# Patient Record
Sex: MALE | Race: WHITE | NOT HISPANIC OR LATINO | Employment: FULL TIME | URBAN - METROPOLITAN AREA
[De-identification: names, ages, dates, MRNs, and addresses within clinical notes are randomized per-mention and may not be internally consistent; named-entity substitution may affect disease eponyms.]

---

## 2022-03-21 LAB
ALBUMIN/GLOBULIN RATIO: 1.9 (ref 1–2.5)
ALBUMIN: 4.3 (ref 3.6–5.1)
ALP SERPL-CCNC: 70 U/L (ref 35–144)
ALT SERPL W P-5'-P-CCNC: 17 U/L (ref 9–46)
AST SERPL-CCNC: 16 U/L (ref 10–35)
BILIRUB SERPL-MCNC: 0.8 MG/DL (ref 0.2–1.2)
BUN SERPL-MCNC: 17 MG/DL (ref 7–25)
BUN/CREAT SERPL: ABNORMAL (ref 6–22)
CALCIUM SERPL-MCNC: 9.6 MG/DL (ref 8.6–10.3)
CHLORIDE SERPL-SCNC: 105 MMOL/L (ref 98–110)
CHOLEST SERPL-MSCNC: 216 MG/DL (ref 0–200)
CO2 SERPL-SCNC: 26 MMOL/L (ref 20–32)
CREAT SERPL-MCNC: 1.2 MG/DL (ref 0.7–1.3)
EST. GFR  (AFRICAN AMERICAN): 81 ML/MIN/1.73 M2
EST. GFR  (NON AFRICAN AMERICAN): 70 ML/MIN/1.73 M2
GLOBULIN: 2.3 (ref 1.9–3.7)
GLUCOSE SERPL-MCNC: 91 MG/DL (ref 65–99)
HDL/CHOLESTEROL RATIO: 4.3 %
HDLC SERPL-MCNC: 50 MG/DL
LDLC SERPL CALC-MCNC: 141 MG/DL
NON HDL CHOL (CALC): 166
POTASSIUM SERPL-SCNC: 4.2 MMOL/L (ref 3.5–5.3)
PROT SNV-MCNC: 6.6 G/L (ref 6.1–8.1)
SODIUM BLD-SCNC: 138 MMOL/L (ref 135–146)
TRIGL SERPL-MCNC: 126 MG/DL

## 2022-09-02 ENCOUNTER — OFFICE VISIT (OUTPATIENT)
Dept: FAMILY MEDICINE | Facility: CLINIC | Age: 57
End: 2022-09-02
Payer: COMMERCIAL

## 2022-09-02 ENCOUNTER — PATIENT MESSAGE (OUTPATIENT)
Dept: FAMILY MEDICINE | Facility: CLINIC | Age: 57
End: 2022-09-02

## 2022-09-02 VITALS
HEART RATE: 82 BPM | HEIGHT: 70 IN | WEIGHT: 185.19 LBS | BODY MASS INDEX: 26.51 KG/M2 | DIASTOLIC BLOOD PRESSURE: 88 MMHG | OXYGEN SATURATION: 95 % | RESPIRATION RATE: 16 BRPM | SYSTOLIC BLOOD PRESSURE: 130 MMHG | TEMPERATURE: 98 F

## 2022-09-02 DIAGNOSIS — Z78.9 STATIN INTOLERANCE: ICD-10-CM

## 2022-09-02 DIAGNOSIS — G47.00 INSOMNIA, UNSPECIFIED TYPE: ICD-10-CM

## 2022-09-02 DIAGNOSIS — D75.1 POLYCYTHEMIA: ICD-10-CM

## 2022-09-02 DIAGNOSIS — I47.10 SVT (SUPRAVENTRICULAR TACHYCARDIA): ICD-10-CM

## 2022-09-02 DIAGNOSIS — Z76.89 ENCOUNTER TO ESTABLISH CARE: Primary | ICD-10-CM

## 2022-09-02 DIAGNOSIS — K90.41 NCGS (NON-CELIAC GLUTEN SENSITIVITY): ICD-10-CM

## 2022-09-02 PROCEDURE — 1160F RVW MEDS BY RX/DR IN RCRD: CPT | Mod: CPTII,S$GLB,, | Performed by: FAMILY MEDICINE

## 2022-09-02 PROCEDURE — 3075F PR MOST RECENT SYSTOLIC BLOOD PRESS GE 130-139MM HG: ICD-10-PCS | Mod: CPTII,S$GLB,, | Performed by: FAMILY MEDICINE

## 2022-09-02 PROCEDURE — 1159F MED LIST DOCD IN RCRD: CPT | Mod: CPTII,S$GLB,, | Performed by: FAMILY MEDICINE

## 2022-09-02 PROCEDURE — 99999 PR PBB SHADOW E&M-NEW PATIENT-LVL III: ICD-10-PCS | Mod: PBBFAC,,, | Performed by: FAMILY MEDICINE

## 2022-09-02 PROCEDURE — 3075F SYST BP GE 130 - 139MM HG: CPT | Mod: CPTII,S$GLB,, | Performed by: FAMILY MEDICINE

## 2022-09-02 PROCEDURE — 3079F PR MOST RECENT DIASTOLIC BLOOD PRESSURE 80-89 MM HG: ICD-10-PCS | Mod: CPTII,S$GLB,, | Performed by: FAMILY MEDICINE

## 2022-09-02 PROCEDURE — 3008F BODY MASS INDEX DOCD: CPT | Mod: CPTII,S$GLB,, | Performed by: FAMILY MEDICINE

## 2022-09-02 PROCEDURE — 3079F DIAST BP 80-89 MM HG: CPT | Mod: CPTII,S$GLB,, | Performed by: FAMILY MEDICINE

## 2022-09-02 PROCEDURE — 99999 PR PBB SHADOW E&M-NEW PATIENT-LVL III: CPT | Mod: PBBFAC,,, | Performed by: FAMILY MEDICINE

## 2022-09-02 PROCEDURE — 99204 PR OFFICE/OUTPT VISIT, NEW, LEVL IV, 45-59 MIN: ICD-10-PCS | Mod: S$GLB,,, | Performed by: FAMILY MEDICINE

## 2022-09-02 PROCEDURE — 99204 OFFICE O/P NEW MOD 45 MIN: CPT | Mod: S$GLB,,, | Performed by: FAMILY MEDICINE

## 2022-09-02 PROCEDURE — 1160F PR REVIEW ALL MEDS BY PRESCRIBER/CLIN PHARMACIST DOCUMENTED: ICD-10-PCS | Mod: CPTII,S$GLB,, | Performed by: FAMILY MEDICINE

## 2022-09-02 PROCEDURE — 3008F PR BODY MASS INDEX (BMI) DOCUMENTED: ICD-10-PCS | Mod: CPTII,S$GLB,, | Performed by: FAMILY MEDICINE

## 2022-09-02 PROCEDURE — 1159F PR MEDICATION LIST DOCUMENTED IN MEDICAL RECORD: ICD-10-PCS | Mod: CPTII,S$GLB,, | Performed by: FAMILY MEDICINE

## 2022-09-02 RX ORDER — ANASTROZOLE 1 MG/1
TABLET ORAL
COMMUNITY
Start: 2019-04-04 | End: 2022-09-29

## 2022-09-02 RX ORDER — TRAZODONE HYDROCHLORIDE 50 MG/1
TABLET ORAL
COMMUNITY
End: 2022-09-02

## 2022-09-02 RX ORDER — PREDNISONE 20 MG/1
TABLET ORAL
COMMUNITY
Start: 2022-01-04 | End: 2022-09-02 | Stop reason: SDUPTHER

## 2022-09-02 RX ORDER — PREDNISONE 20 MG/1
TABLET ORAL
Qty: 30 TABLET | Refills: 3 | Status: SHIPPED | OUTPATIENT
Start: 2022-09-02 | End: 2022-12-01

## 2022-09-02 RX ORDER — GEMFIBROZIL 600 MG/1
TABLET, FILM COATED ORAL
COMMUNITY
Start: 2020-08-02 | End: 2022-09-29

## 2022-09-02 RX ORDER — EZETIMIBE 10 MG/1
TABLET ORAL
COMMUNITY
Start: 2020-08-02 | End: 2022-09-29

## 2022-09-02 RX ORDER — ZOLPIDEM TARTRATE 10 MG/1
TABLET ORAL
COMMUNITY
Start: 2020-07-05 | End: 2022-09-29

## 2022-09-02 NOTE — PROGRESS NOTES
Subjective:      Patient ID: Harish Busby is a 57 y.o. male.    Chief Complaint: Establish Care    HPI    Patient with pmhx of polycythemia, HLD, HTN, SVT, non celiac gluten sensitivity, dairy sensitivity, insomnia here today to establish care. Taking prednisone for sensitivities - 20 mg as needed. He was started on this in January of this year. Also notes a history of elevated estrogen. Patient taking vitamin D.     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 Recent cbc in 6/2022 - nml.     Was seeing Dr. Orourke - endocrinology   [facility] cardiology - SVT - metoprolol.     Unable to tolerate statin medications - memory loss - was on injection but held due to interfering with testosterone/allergy    Past Medical History:   Diagnosis Date    Hyperlipidemia     Hypertension     NCGS (non-celiac gluten sensitivity)     Polycythemia     Statin intolerance     SVT (supraventricular tachycardia)        Past Surgical History:   Procedure Laterality Date    DENTAL SURGERY      dental implant    FINGER SURGERY         Family History   Problem Relation Age of Onset    Hypertension Mother     Cancer Father         cancer - leukemia    Cancer Brother         lung       Social History     Socioeconomic History    Marital status:    Occupational History    Occupation: [employer]   Tobacco Use    Smoking status: Former     Packs/day: 1.00     Years: 5.00     Pack years: 5.00     Types: Cigarettes    Tobacco comments:     Quit 19 yo   Substance and Sexual Activity    Alcohol use: Not Currently    Drug use: Not Currently    Sexual activity: Not Currently       Health Maintenance Topics with due status: Not Due       Topic Last Completion Date    TETANUS VACCINE 08/07/2013    Colorectal Cancer Screening 03/01/2014       Medication List with Changes/Refills   Current Medications    ANASTROZOLE (ARIMIDEX) 1 MG TAB    anastrozole 1 mg  tablet   TAKE 1 TABLET BY MOUTH ONCE DAILY AS DIRECTED FOR 90 DAYS    CALCIUM CARBONATE-VITAMIN D3 (CALCIUM 600 WITH VITAMIN D3) 600 MG-10 MCG (400 UNIT) CHEW        EZETIMIBE (ZETIA) 10 MG TABLET    Zetia 10 mg tablet   Take 1 tablet every day by oral route for 90 days.    GEMFIBROZIL (LOPID) 600 MG TABLET    gemfibrozil 600 mg tablet   TAKE 1 TABLET BY MOUTH TWICE DAILY    METOPROLOL SUCCINATE 25 MG CSPX    1/2 tab    ZOLPIDEM (AMBIEN) 10 MG TAB    zolpidem 10 mg tablet   TAKE 1 TABLET BY MOUTH ONCE DAILY AT BEDTIME   Changed and/or Refilled Medications    Modified Medication Previous Medication    PREDNISONE (DELTASONE) 20 MG TABLET predniSONE (DELTASONE) 20 MG tablet       prednisone 20 mg tablet  TAKE 1 TABLET BY MOUTH ONCE DAILY AS NEEDED Strength: 20 mg    prednisone 20 mg tablet   TAKE 1 TABLET BY MOUTH ONCE DAILY AS NEEDED   Discontinued Medications    TRAZODONE (DESYREL) 50 MG TABLET    1 tablet at bedtime as needed       Review of patient's allergies indicates:  No Known Allergies    Review of Systems   Constitutional:  Negative for fever.   HENT:  Negative for congestion.    Eyes:  Negative for blurred vision.   Respiratory:  Negative for shortness of breath.    Cardiovascular:  Negative for chest pain and leg swelling.   Gastrointestinal:  Negative for abdominal pain, constipation and diarrhea.   Genitourinary:  Negative for dysuria.   Skin:  Negative for rash.   Neurological:  Negative for headaches.     Objective:     Vitals:    09/02/22 0927   BP: 130/88   Pulse: 82   Resp: 16   Temp: 97.6 °F (36.4 °C)     Body mass index is 26.57 kg/m².    Physical Exam  Vitals and nursing note reviewed.   Constitutional:       General: He is not in acute distress.     Appearance: He is well-developed.   HENT:      Head: Normocephalic and atraumatic.      Right Ear: External ear normal.      Left Ear: External ear normal.      Nose: Nose normal.   Eyes:      Conjunctiva/sclera: Conjunctivae normal.      Pupils:  Pupils are equal, round, and reactive to light.   Neck:      Thyroid: No thyromegaly.   Cardiovascular:      Rate and Rhythm: Normal rate and regular rhythm.      Heart sounds: Normal heart sounds. No murmur heard.  Pulmonary:      Effort: Pulmonary effort is normal. No respiratory distress.      Breath sounds: Normal breath sounds. No wheezing or rales.   Chest:      Chest wall: No tenderness.   Abdominal:      General: Bowel sounds are normal. There is no distension.      Palpations: Abdomen is soft.      Tenderness: There is no abdominal tenderness.   Lymphadenopathy:      Cervical: No cervical adenopathy.   Skin:     General: Skin is warm and dry.   Neurological:      Mental Status: He is alert and oriented to person, place, and time.       Assessment and Plan:     Encounter to establish care    SVT (supraventricular tachycardia)    Polycythemia  -     predniSONE (DELTASONE) 20 MG tablet; prednisone 20 mg tablet  TAKE 1 TABLET BY MOUTH ONCE DAILY AS NEEDED Strength: 20 mg  Dispense: 30 tablet; Refill: 3    NCGS (non-celiac gluten sensitivity)  -     predniSONE (DELTASONE) 20 MG tablet; prednisone 20 mg tablet  TAKE 1 TABLET BY MOUTH ONCE DAILY AS NEEDED Strength: 20 mg  Dispense: 30 tablet; Refill: 3    Statin intolerance    Insomnia, unspecified type    Bone scan in 1 year - given long term use of steroids       No follow-ups on file.

## 2022-09-11 ENCOUNTER — PATIENT MESSAGE (OUTPATIENT)
Dept: FAMILY MEDICINE | Facility: CLINIC | Age: 57
End: 2022-09-11
Payer: COMMERCIAL

## 2022-09-17 NOTE — TELEPHONE ENCOUNTER
Staff can you please print the attachments and place in my box.   Yulia can you update needed labs in patient's chart.

## 2022-09-23 ENCOUNTER — PATIENT OUTREACH (OUTPATIENT)
Dept: ADMINISTRATIVE | Facility: HOSPITAL | Age: 57
End: 2022-09-23
Payer: COMMERCIAL

## 2022-09-26 ENCOUNTER — PATIENT MESSAGE (OUTPATIENT)
Dept: FAMILY MEDICINE | Facility: CLINIC | Age: 57
End: 2022-09-26
Payer: COMMERCIAL

## 2022-09-26 NOTE — TELEPHONE ENCOUNTER
No new care gaps identified.  [facility] Embedded Care Gaps. Reference number: [id number]. 9/26/2022   11:17:55 AM MERLENET

## 2022-09-26 NOTE — TELEPHONE ENCOUNTER
LV - 9/2/22    From [employer] message, not sure if these are already set to 90-day or which pharmacy to send to

## 2022-09-28 RX ORDER — ZOLPIDEM TARTRATE 10 MG/1
TABLET ORAL
Qty: 30 TABLET | Refills: 2 | Status: CANCELLED | OUTPATIENT
Start: 2022-09-28

## 2022-09-28 RX ORDER — EZETIMIBE 10 MG/1
TABLET ORAL
Qty: 30 TABLET | Refills: 2 | Status: CANCELLED | OUTPATIENT
Start: 2022-09-28

## 2022-09-28 RX ORDER — ANASTROZOLE 1 MG/1
TABLET ORAL
Qty: 30 TABLET | Refills: 2 | Status: CANCELLED | OUTPATIENT
Start: 2022-09-28

## 2022-09-28 RX ORDER — GEMFIBROZIL 600 MG/1
TABLET, FILM COATED ORAL
Qty: 60 TABLET | Status: CANCELLED | OUTPATIENT
Start: 2022-09-28

## 2022-09-29 RX ORDER — METOPROLOL SUCCINATE 25 MG/1
12.5 TABLET, EXTENDED RELEASE ORAL DAILY
Qty: 45 TABLET | Refills: 3 | Status: SHIPPED | OUTPATIENT
Start: 2022-09-29 | End: 2022-10-01 | Stop reason: SDUPTHER

## 2022-09-29 RX ORDER — EZETIMIBE 10 MG/1
10 TABLET ORAL DAILY
Qty: 90 TABLET | Refills: 3 | Status: SHIPPED | OUTPATIENT
Start: 2022-09-29 | End: 2024-03-18

## 2022-09-29 RX ORDER — GEMFIBROZIL 600 MG/1
600 TABLET, FILM COATED ORAL
Qty: 180 TABLET | Refills: 3 | Status: SHIPPED | OUTPATIENT
Start: 2022-09-29 | End: 2022-12-01

## 2022-09-29 RX ORDER — ZOLPIDEM TARTRATE 10 MG/1
10 TABLET ORAL NIGHTLY PRN
Qty: 30 TABLET | Refills: 2 | Status: SHIPPED | OUTPATIENT
Start: 2022-09-29 | End: 2023-03-17

## 2022-09-29 RX ORDER — ZOLPIDEM TARTRATE 10 MG/1
10 TABLET ORAL NIGHTLY PRN
Qty: 90 TABLET | Refills: 0 | Status: SHIPPED | OUTPATIENT
Start: 2022-09-29 | End: 2022-09-29 | Stop reason: SDUPTHER

## 2022-09-29 RX ORDER — ANASTROZOLE 1 MG/1
1 TABLET ORAL DAILY
Qty: 90 TABLET | Refills: 3 | Status: SHIPPED | OUTPATIENT
Start: 2022-09-29 | End: 2023-09-29

## 2022-09-29 NOTE — TELEPHONE ENCOUNTER
LV - 9/2/22    Pt wants AMBIEN as a 30-day supply, not 90-day supply that was already sent. Per [employer] message

## 2022-09-29 NOTE — TELEPHONE ENCOUNTER
Come talk with me - I need to discuss how to do refills with you.  Also call him and verify if he is taking 1/2 tab of 25 mg of metoprolol succinate daily.

## 2022-09-29 NOTE — TELEPHONE ENCOUNTER
Care Due:                  Date            Visit Type   Department     Provider  --------------------------------------------------------------------------------                                NP -                              PRIMARY      [facility]  Last Visit: 09-      CARE ([facility])   MEDICINE       Kaila West  Next Visit: None Scheduled  None         None Found                                                            Last  Test          Frequency    Reason                     Performed    Due Date  --------------------------------------------------------------------------------    CBC.........  12 months..  gemfibroziL..............  Not Found    Overdue    Lipid Panel.  12 months..  ezetimibe, gemfibroziL...  03- 09-    [facility] Embedded Care Gaps. Reference number: [id number]. 9/29/2022   1:50:33 PM CDT

## 2022-09-30 NOTE — TELEPHONE ENCOUNTER
Pt states he takes 2 metoprolol daily (50 mg). States that was his previous RX so he'd like to stay at 50 unless advised otherwise

## 2022-10-01 RX ORDER — METOPROLOL SUCCINATE 25 MG/1
50 TABLET, EXTENDED RELEASE ORAL DAILY
Qty: 180 TABLET | Refills: 3 | Status: SHIPPED | OUTPATIENT
Start: 2022-10-01 | End: 2023-03-12

## 2022-10-19 ENCOUNTER — TELEPHONE (OUTPATIENT)
Dept: FAMILY MEDICINE | Facility: CLINIC | Age: 57
End: 2022-10-19

## 2022-10-19 ENCOUNTER — TELEPHONE (OUTPATIENT)
Dept: GASTROENTEROLOGY | Facility: CLINIC | Age: 57
End: 2022-10-19
Payer: COMMERCIAL

## 2022-10-19 ENCOUNTER — HOSPITAL ENCOUNTER (OUTPATIENT)
Dept: RADIOLOGY | Facility: HOSPITAL | Age: 57
Discharge: HOME | End: 2022-10-19
Attending: INTERNAL MEDICINE
Payer: COMMERCIAL

## 2022-10-19 ENCOUNTER — OFFICE VISIT (OUTPATIENT)
Dept: FAMILY MEDICINE | Facility: CLINIC | Age: 57
End: 2022-10-19
Payer: COMMERCIAL

## 2022-10-19 VITALS
BODY MASS INDEX: 25.88 KG/M2 | HEIGHT: 70 IN | WEIGHT: 180.75 LBS | TEMPERATURE: 96 F | OXYGEN SATURATION: 97 % | SYSTOLIC BLOOD PRESSURE: 126 MMHG | HEART RATE: 81 BPM | DIASTOLIC BLOOD PRESSURE: 94 MMHG

## 2022-10-19 DIAGNOSIS — K92.1 BLOOD IN STOOL: ICD-10-CM

## 2022-10-19 DIAGNOSIS — R10.11 RIGHT UPPER QUADRANT ABDOMINAL PAIN: Primary | ICD-10-CM

## 2022-10-19 DIAGNOSIS — R10.9 ABDOMINAL PAIN, UNSPECIFIED ABDOMINAL LOCATION: Primary | ICD-10-CM

## 2022-10-19 DIAGNOSIS — R10.11 RIGHT UPPER QUADRANT ABDOMINAL PAIN: ICD-10-CM

## 2022-10-19 PROCEDURE — 1159F MED LIST DOCD IN RCRD: CPT | Mod: CPTII,S$GLB,, | Performed by: INTERNAL MEDICINE

## 2022-10-19 PROCEDURE — 3074F SYST BP LT 130 MM HG: CPT | Mod: CPTII,S$GLB,, | Performed by: INTERNAL MEDICINE

## 2022-10-19 PROCEDURE — 74019 RADEX ABDOMEN 2 VIEWS: CPT | Mod: 26,,, | Performed by: RADIOLOGY

## 2022-10-19 PROCEDURE — 99214 PR OFFICE/OUTPT VISIT, EST, LEVL IV, 30-39 MIN: ICD-10-PCS | Mod: S$GLB,,, | Performed by: INTERNAL MEDICINE

## 2022-10-19 PROCEDURE — 1159F PR MEDICATION LIST DOCUMENTED IN MEDICAL RECORD: ICD-10-PCS | Mod: CPTII,S$GLB,, | Performed by: INTERNAL MEDICINE

## 2022-10-19 PROCEDURE — 3080F DIAST BP >= 90 MM HG: CPT | Mod: CPTII,S$GLB,, | Performed by: INTERNAL MEDICINE

## 2022-10-19 PROCEDURE — 99999 PR PBB SHADOW E&M-EST. PATIENT-LVL V: ICD-10-PCS | Mod: PBBFAC,,, | Performed by: INTERNAL MEDICINE

## 2022-10-19 PROCEDURE — 99999 PR PBB SHADOW E&M-EST. PATIENT-LVL V: CPT | Mod: PBBFAC,,, | Performed by: INTERNAL MEDICINE

## 2022-10-19 PROCEDURE — 74019 RADEX ABDOMEN 2 VIEWS: CPT | Mod: TC,FY,PO

## 2022-10-19 PROCEDURE — 3074F PR MOST RECENT SYSTOLIC BLOOD PRESSURE < 130 MM HG: ICD-10-PCS | Mod: CPTII,S$GLB,, | Performed by: INTERNAL MEDICINE

## 2022-10-19 PROCEDURE — 99214 OFFICE O/P EST MOD 30 MIN: CPT | Mod: S$GLB,,, | Performed by: INTERNAL MEDICINE

## 2022-10-19 PROCEDURE — 74019 XR ABDOMEN FLAT AND ERECT: ICD-10-PCS | Mod: 26,,, | Performed by: RADIOLOGY

## 2022-10-19 PROCEDURE — 3080F PR MOST RECENT DIASTOLIC BLOOD PRESSURE >= 90 MM HG: ICD-10-PCS | Mod: CPTII,S$GLB,, | Performed by: INTERNAL MEDICINE

## 2022-10-19 RX ORDER — TRAZODONE HYDROCHLORIDE 50 MG/1
1 TABLET ORAL NIGHTLY PRN
COMMUNITY
End: 2023-02-28

## 2022-10-19 RX ORDER — PANTOPRAZOLE SODIUM 40 MG/1
40 TABLET, DELAYED RELEASE ORAL DAILY
Qty: 30 TABLET | Refills: 1 | Status: SHIPPED | OUTPATIENT
Start: 2022-10-19 | End: 2022-10-26

## 2022-10-19 NOTE — PROGRESS NOTES
Subjective:       Patient ID: Harish Busby is a 57 y.o. male.    Chief Complaint: Abdominal Pain    2 months RUQ abdominal discomfort-----------worse with eating .       Now with some bright red blood with stool---------last week or so.          No N/V.  Had blood tests at lab vicky cmp,,cbc, u/a was normal. Iron low.   2 weeks ago.        Had colon about 3 years ago gi asso.---polyps,         Abdominal Pain  Pertinent negatives include no arthralgias, constipation, diarrhea, dysuria, fever, frequency, headaches, hematuria, myalgias, nausea or vomiting.   Past Medical History:   Diagnosis Date    Hyperlipidemia     Hypertension     NCGS (non-celiac gluten sensitivity)     Polycythemia     Statin intolerance     SVT (supraventricular tachycardia)      Past Surgical History:   Procedure Laterality Date    DENTAL SURGERY      dental implant    FINGER SURGERY       Family History   Problem Relation Age of Onset    Hypertension Mother     Cancer Father         cancer - leukemia    Cancer Brother         lung     Social History     Socioeconomic History    Marital status:    Occupational History    Occupation: [employer]   Tobacco Use    Smoking status: Former     Packs/day: 1.00     Years: 5.00     Pack years: 5.00     Types: Cigarettes    Smokeless tobacco: Never    Tobacco comments:     Quit 17 yo   Substance and Sexual Activity    Alcohol use: Not Currently    Drug use: Not Currently    Sexual activity: Not Currently     Review of Systems   Constitutional:  Negative for activity change, appetite change, chills, diaphoresis, fatigue, fever and unexpected weight change.   HENT:  Negative for drooling, ear discharge, ear pain, facial swelling, hearing loss, mouth sores, nosebleeds, postnasal drip, rhinorrhea, sinus pressure, sneezing, sore throat, tinnitus, trouble swallowing and voice change.    Eyes:  Negative for photophobia, redness and visual disturbance.   Respiratory:  Negative for apnea, cough,  choking, chest tightness, shortness of breath and wheezing.    Cardiovascular:  Negative for chest pain, palpitations and leg swelling.   Gastrointestinal:  Positive for abdominal pain and blood in stool. Negative for abdominal distention, constipation, diarrhea, nausea, rectal pain and vomiting.   Endocrine: Negative for cold intolerance, heat intolerance, polydipsia, polyphagia and polyuria.   Genitourinary:  Negative for difficulty urinating, dysuria, enuresis, flank pain, frequency, genital sores, hematuria and urgency.   Musculoskeletal:  Negative for arthralgias, back pain, gait problem, joint swelling, myalgias, neck pain and neck stiffness.   Skin:  Negative for color change, pallor, rash and wound.   Allergic/Immunologic: Negative for food allergies and immunocompromised state.   Neurological:  Negative for dizziness, tremors, seizures, syncope, facial asymmetry, speech difficulty, weakness, light-headedness, numbness and headaches.   Hematological:  Negative for adenopathy. Does not bruise/bleed easily.   Psychiatric/Behavioral:  Negative for agitation, behavioral problems, confusion, decreased concentration, dysphoric mood, hallucinations, self-injury, sleep disturbance and suicidal ideas. The patient is not nervous/anxious and is not hyperactive.      Objective:      Physical Exam  Vitals and nursing note reviewed.   Constitutional:       General: He is not in acute distress.     Appearance: Normal appearance. He is well-developed. He is not diaphoretic.   HENT:      Head: Normocephalic and atraumatic.      Mouth/Throat:      Pharynx: No oropharyngeal exudate.   Eyes:      General: No scleral icterus.     Pupils: Pupils are equal, round, and reactive to light.   Neck:      Thyroid: No thyromegaly.      Vascular: No carotid bruit or JVD.      Trachea: No tracheal deviation.   Cardiovascular:      Rate and Rhythm: Normal rate and regular rhythm.      Heart sounds: Normal heart sounds.   Pulmonary:       Effort: Pulmonary effort is normal. No respiratory distress.      Breath sounds: Normal breath sounds. No wheezing or rales.   Chest:      Chest wall: No tenderness.   Abdominal:      General: Bowel sounds are normal. There is no distension.      Palpations: Abdomen is soft. There is no mass.      Tenderness: There is abdominal tenderness. There is no right CVA tenderness, left CVA tenderness, guarding or rebound.      Hernia: No hernia is present.   Genitourinary:     Rectum: Normal. Guaiac result negative.   Musculoskeletal:         General: No tenderness. Normal range of motion.      Cervical back: Normal range of motion and neck supple.   Lymphadenopathy:      Cervical: No cervical adenopathy.   Skin:     General: Skin is warm and dry.      Coloration: Skin is not pale.      Findings: No erythema or rash.   Neurological:      Mental Status: He is alert and oriented to person, place, and time.   Psychiatric:         Behavior: Behavior normal.         Thought Content: Thought content normal.         Judgment: Judgment normal.       CMP  Sodium   Date Value Ref Range Status   03/21/2022 138 135 - 146 mmol/L Final     Potassium   Date Value Ref Range Status   03/21/2022 4.2 3.5 - 5.3 mmol/L Final     Chloride   Date Value Ref Range Status   03/21/2022 105 98 - 110 mmol/L Final     CO2   Date Value Ref Range Status   03/21/2022 26 20 - 32 mmol/L Final     Glucose   Date Value Ref Range Status   03/21/2022 91 65 - 99 mg/dL Final     BUN   Date Value Ref Range Status   03/21/2022 17 7 - 25 mg/dL Final     Creatinine   Date Value Ref Range Status   03/21/2022 1.2 0.7 - 1.3 mg/dL Final     Calcium   Date Value Ref Range Status   03/21/2022 9.6 8.6 - 10.3 mg/dL Final     Albumin   Date Value Ref Range Status   03/21/2022 4.3 3.6 - 5.1 Final     Total Bilirubin   Date Value Ref Range Status   03/21/2022 0.8 0.2 - 1.2 mg/dL Final     Alkaline Phosphatase   Date Value Ref Range Status   03/21/2022 70 35 - 144 U/L Final      AST   Date Value Ref Range Status   03/21/2022 16 10 - 35 U/L Final     ALT   Date Value Ref Range Status   03/21/2022 17 9 - 46 U/L Final     eGFR if    Date Value Ref Range Status   03/21/2022 81 >OR=60 mL/min/1.73 m2 Final     eGFR if non    Date Value Ref Range Status   03/21/2022 70.0 >OR=60 mL/min/1.73 m2 Final     No results found for: WBC, HGB, HCT, MCV, PLT  Lab Results   Component Value Date    CHOL 216 (H) 03/21/2022     Lab Results   Component Value Date    HDL 50 03/21/2022     Lab Results   Component Value Date    LDLCALC 141 (H) 03/21/2022     Lab Results   Component Value Date    TRIG 126 03/21/2022     Lab Results   Component Value Date    CHOLHDL 4.3 03/21/2022     No results found for: TSH  No results found for: LABA1C, HGBA1C  Assessment:       1. Right upper quadrant abdominal pain    2. Blood in stool          Plan:   Right upper quadrant abdominal pain  -     CBC Auto Differential; Future; Expected date: 10/19/2022, CMP-------  -     X-Ray Abdomen Flat And Erect; Future; Expected date: 10/19/2022  -     Cancel: Ambulatory referral/consult to Gastroenterology; Future; Expected date: 10/26/2022  -     Ambulatory referral/consult to Gastroenterology; Future; Expected date: 10/26/2022    Blood in stool  -     Cancel: Ambulatory referral/consult to Gastroenterology; Future; Expected date: 10/26/2022    Other orders  -     pantoprazole (PROTONIX) 40 MG tablet; Take 1 tablet (40 mg total) by mouth once daily.  Dispense: 30 tablet; Refill: 1      As above------------f/u 2 weeks---------go to er for worsening symptoms.-----------------

## 2022-10-26 ENCOUNTER — OFFICE VISIT (OUTPATIENT)
Dept: GASTROENTEROLOGY | Facility: CLINIC | Age: 57
End: 2022-10-26
Payer: COMMERCIAL

## 2022-10-26 ENCOUNTER — HOSPITAL ENCOUNTER (OUTPATIENT)
Dept: RADIOLOGY | Facility: HOSPITAL | Age: 57
Discharge: HOME | End: 2022-10-26
Attending: NURSE PRACTITIONER
Payer: COMMERCIAL

## 2022-10-26 VITALS
HEIGHT: 70 IN | DIASTOLIC BLOOD PRESSURE: 92 MMHG | SYSTOLIC BLOOD PRESSURE: 150 MMHG | HEART RATE: 73 BPM | BODY MASS INDEX: 26.29 KG/M2 | WEIGHT: 183.63 LBS

## 2022-10-26 DIAGNOSIS — R10.9 RIGHT SIDED ABDOMINAL PAIN: Primary | ICD-10-CM

## 2022-10-26 DIAGNOSIS — K90.41 GLUTEN INTOLERANCE: ICD-10-CM

## 2022-10-26 DIAGNOSIS — K59.09 CHRONIC CONSTIPATION: ICD-10-CM

## 2022-10-26 DIAGNOSIS — R10.9 RIGHT SIDED ABDOMINAL PAIN: ICD-10-CM

## 2022-10-26 DIAGNOSIS — K62.5 RECTAL BLEEDING: ICD-10-CM

## 2022-10-26 PROCEDURE — 74019 RADEX ABDOMEN 2 VIEWS: CPT | Mod: TC,FY,PO

## 2022-10-26 PROCEDURE — 3080F DIAST BP >= 90 MM HG: CPT | Mod: CPTII,S$GLB,, | Performed by: NURSE PRACTITIONER

## 2022-10-26 PROCEDURE — 3080F PR MOST RECENT DIASTOLIC BLOOD PRESSURE >= 90 MM HG: ICD-10-PCS | Mod: CPTII,S$GLB,, | Performed by: NURSE PRACTITIONER

## 2022-10-26 PROCEDURE — 99204 PR OFFICE/OUTPT VISIT, NEW, LEVL IV, 45-59 MIN: ICD-10-PCS | Mod: S$GLB,,, | Performed by: NURSE PRACTITIONER

## 2022-10-26 PROCEDURE — 1160F RVW MEDS BY RX/DR IN RCRD: CPT | Mod: CPTII,S$GLB,, | Performed by: NURSE PRACTITIONER

## 2022-10-26 PROCEDURE — 1159F MED LIST DOCD IN RCRD: CPT | Mod: CPTII,S$GLB,, | Performed by: NURSE PRACTITIONER

## 2022-10-26 PROCEDURE — 74019 XR ABDOMEN FLAT AND ERECT: ICD-10-PCS | Mod: 26,,, | Performed by: RADIOLOGY

## 2022-10-26 PROCEDURE — 99999 PR PBB SHADOW E&M-EST. PATIENT-LVL V: CPT | Mod: PBBFAC,,, | Performed by: NURSE PRACTITIONER

## 2022-10-26 PROCEDURE — 3077F SYST BP >= 140 MM HG: CPT | Mod: CPTII,S$GLB,, | Performed by: NURSE PRACTITIONER

## 2022-10-26 PROCEDURE — 99204 OFFICE O/P NEW MOD 45 MIN: CPT | Mod: S$GLB,,, | Performed by: NURSE PRACTITIONER

## 2022-10-26 PROCEDURE — 3077F PR MOST RECENT SYSTOLIC BLOOD PRESSURE >= 140 MM HG: ICD-10-PCS | Mod: CPTII,S$GLB,, | Performed by: NURSE PRACTITIONER

## 2022-10-26 PROCEDURE — 99999 PR PBB SHADOW E&M-EST. PATIENT-LVL V: ICD-10-PCS | Mod: PBBFAC,,, | Performed by: NURSE PRACTITIONER

## 2022-10-26 PROCEDURE — 1160F PR REVIEW ALL MEDS BY PRESCRIBER/CLIN PHARMACIST DOCUMENTED: ICD-10-PCS | Mod: CPTII,S$GLB,, | Performed by: NURSE PRACTITIONER

## 2022-10-26 PROCEDURE — 1159F PR MEDICATION LIST DOCUMENTED IN MEDICAL RECORD: ICD-10-PCS | Mod: CPTII,S$GLB,, | Performed by: NURSE PRACTITIONER

## 2022-10-26 PROCEDURE — 74019 RADEX ABDOMEN 2 VIEWS: CPT | Mod: 26,,, | Performed by: RADIOLOGY

## 2022-10-26 RX ORDER — DICYCLOMINE HYDROCHLORIDE 20 MG/1
20 TABLET ORAL 3 TIMES DAILY PRN
Qty: 30 TABLET | Refills: 0 | Status: SHIPPED | OUTPATIENT
Start: 2022-10-26 | End: 2022-11-17

## 2022-10-26 NOTE — PROGRESS NOTES
"Clinic Consult:  [facility] Consultation Note    Reason for Consult:  The primary encounter diagnosis was Right sided abdominal pain. Diagnoses of Chronic constipation, Gluten intolerance, and Rectal bleeding were also pertinent to this visit.    PCP: Kaila West   [address] / [place] [id number]    HPI:  This is a 57 y.o. male here for evaluation of abdominal pain.     Onset of symptoms started 8 weeks ago. He started with abdominal pain and constipation at the same time. His pain is located over the RLQ abdomen. Pain is intermittent. He is unable to identify any aggravating or relieving factors. He describes the pain as "numbness" and "fullness".     He also started with rectal bleeding. Bleeding was bright red blood. The red blood appeared to be streaked on the outside of the stool as well as on the toilet tissue.     He has a history of significant seasonal and food allergies. He has a family history of celiac disease in his two nieces and another brother is non-celiac gluten intolerant. He denies any other family hx of bowel diseases.     He was initially started on prednisone for skin rashes that are thought to be secondary to food allergies. He noticed that when he would take the prednisone, he felt like his bowels moved better.     He started fiber supplement and feels his constipation has improved but continues with abdominal pain and rectal bleeding.     His last colonoscopy was 3 years ago [place] [facility] [place] ?colon polyps.     Review of Systems   Constitutional:  Negative for fever, malaise/fatigue and weight loss.   HENT:  Negative for sore throat.    Respiratory:  Negative for cough and wheezing.    Cardiovascular:  Negative for chest pain and palpitations.   Gastrointestinal:  Positive for heartburn.   Genitourinary:  Negative for dysuria and frequency.   Musculoskeletal:  Negative for back pain, joint pain, myalgias and neck pain.   Skin:  Negative for itching and rash. "   Neurological:  Negative for dizziness, speech change, seizures, loss of consciousness and headaches.   Psychiatric/Behavioral:  Negative for depression and substance abuse. The patient is not nervous/anxious.      Medical History:  has a past medical history of Hyperlipidemia, Hypertension, NCGS (non-celiac gluten sensitivity), Polycythemia, Statin intolerance, and SVT (supraventricular tachycardia).    Surgical History:  has a past surgical history that includes Finger surgery and Dental surgery.    Family History: family history includes Cancer in his brother and father; Hypertension in his mother..     Social History:  reports that he has quit smoking. His smoking use included cigarettes. He has a 5.00 pack-year smoking history. He has never used smokeless tobacco. He reports that he does not currently use alcohol. He reports that he does not currently use drugs.    Allergies: Reviewed    Home Medications:   Current Outpatient Medications on File Prior to Visit   Medication Sig Dispense Refill    anastrozole (ARIMIDEX) 1 mg Tab Take 1 tablet (1 mg total) by mouth once daily. 90 tablet 3    calcium carbonate-vitamin D3 (CALCIUM 600 WITH VITAMIN D3) 600 mg-10 mcg (400 unit) Chew       ezetimibe (ZETIA) 10 mg tablet Take 1 tablet (10 mg total) by mouth once daily. 90 tablet 3    gemfibroziL (LOPID) 600 MG tablet Take 1 tablet (600 mg total) by mouth 2 (two) times daily before meals. 180 tablet 3    metoprolol succinate (TOPROL-XL) 25 MG 24 hr tablet Take 2 tablets (50 mg total) by mouth once daily. 180 tablet 3    predniSONE (DELTASONE) 20 MG tablet prednisone 20 mg tablet  TAKE 1 TABLET BY MOUTH ONCE DAILY AS NEEDED Strength: 20 mg 30 tablet 3    traZODone (DESYREL) 50 MG tablet 1 tablet nightly as needed.      zolpidem (AMBIEN) 10 mg Tab Take 1 tablet (10 mg total) by mouth nightly as needed (insomnia). 30 tablet 2     No current facility-administered medications on file prior to visit.       Physical Exam:  BP  "(!) 150/92 (BP Location: Left arm, Patient Position: Sitting, BP Method: Large (Manual))   Pulse 73   Ht 5' 10" (1.778 m)   Wt 83.3 kg (183 lb 10.3 oz)   BMI 26.35 kg/m²   Body mass index is 26.35 kg/m².  Physical Exam  Constitutional:       General: He is not in acute distress.  HENT:      Head: Normocephalic.   Neurological:      General: No focal deficit present.      Mental Status: He is alert.   Psychiatric:         Mood and Affect: Mood normal.         Judgment: Judgment normal.       Labs: Pertinent labs reviewed.  Assessment:  1. Right sided abdominal pain    2. Chronic constipation    3. Gluten intolerance    4. Rectal bleeding      Possible continued constipation as cause of symptoms. His abdominal pain is not classic for GI etiology, however, it is a consideration. I find it interesting that prednisone improves his constipation and improves his "digestion". No known underlying bowel disease. He is gluten intolerant and follows a gluten free diet. Last colonoscopy was 3 years ago.    Recommendations:   - repeat xray today to assess response to fiber supplement and to verify constipation has resolved.   - proceed with colonoscopy on 10/31/22  - if all of the above or normal, I suggest repeating his colonoscopy.   - would not recommend celiac serology despite possibility 2/2 gluten free diet as this can cause false negative results.     Right sided abdominal pain  -     Ambulatory referral/consult to Gastroenterology  -     X-Ray Abdomen Flat And Erect; Future; Expected date: 10/26/2022    Chronic constipation  -     X-Ray Abdomen Flat And Erect; Future; Expected date: 10/26/2022    Gluten intolerance  -     X-Ray Abdomen Flat And Erect; Future; Expected date: 10/26/2022    Rectal bleeding    Other orders  -     dicyclomine (BENTYL) 20 mg tablet; Take 1 tablet (20 mg total) by mouth 3 (three) times daily as needed (abdominal pain).  Dispense: 30 tablet; Refill: 0      Follow up to be determined after " results/ procedure(s).    Thank you so much for allowing me to participate in the care of DERRICK Calle

## 2022-10-27 RX ORDER — POLYETHYLENE GLYCOL 3350, SODIUM SULFATE ANHYDROUS, SODIUM BICARBONATE, SODIUM CHLORIDE, POTASSIUM CHLORIDE 236; 22.74; 6.74; 5.86; 2.97 G/4L; G/4L; G/4L; G/4L; G/4L
4 POWDER, FOR SOLUTION ORAL ONCE
Qty: 4000 ML | Refills: 0 | Status: SHIPPED | OUTPATIENT
Start: 2022-10-27 | End: 2022-10-27

## 2022-10-31 ENCOUNTER — HOSPITAL ENCOUNTER (OUTPATIENT)
Dept: RADIOLOGY | Facility: HOSPITAL | Age: 57
Discharge: HOME | End: 2022-10-31
Attending: INTERNAL MEDICINE
Payer: COMMERCIAL

## 2022-10-31 DIAGNOSIS — R10.9 ABDOMINAL PAIN, UNSPECIFIED ABDOMINAL LOCATION: ICD-10-CM

## 2022-10-31 PROCEDURE — 74177 CT ABDOMEN PELVIS WITH CONTRAST: ICD-10-PCS | Mod: 26,,, | Performed by: RADIOLOGY

## 2022-10-31 PROCEDURE — 74177 CT ABD & PELVIS W/CONTRAST: CPT | Mod: 26,,, | Performed by: RADIOLOGY

## 2022-10-31 PROCEDURE — 25500020 PHARM REV CODE 255: Performed by: INTERNAL MEDICINE

## 2022-10-31 PROCEDURE — 74177 CT ABD & PELVIS W/CONTRAST: CPT | Mod: TC

## 2022-10-31 RX ADMIN — IOHEXOL 30 ML: 350 INJECTION, SOLUTION INTRAVENOUS at 12:10

## 2022-10-31 RX ADMIN — IOHEXOL 100 ML: 350 INJECTION, SOLUTION INTRAVENOUS at 01:10

## 2022-11-02 ENCOUNTER — OFFICE VISIT (OUTPATIENT)
Dept: FAMILY MEDICINE | Facility: CLINIC | Age: 57
End: 2022-11-02
Payer: COMMERCIAL

## 2022-11-02 ENCOUNTER — TELEPHONE (OUTPATIENT)
Dept: VASCULAR SURGERY | Facility: CLINIC | Age: 57
End: 2022-11-02
Payer: COMMERCIAL

## 2022-11-02 VITALS
BODY MASS INDEX: 26.1 KG/M2 | WEIGHT: 181.88 LBS | DIASTOLIC BLOOD PRESSURE: 82 MMHG | SYSTOLIC BLOOD PRESSURE: 124 MMHG | HEART RATE: 76 BPM | RESPIRATION RATE: 16 BRPM | TEMPERATURE: 98 F

## 2022-11-02 DIAGNOSIS — I47.10 SVT (SUPRAVENTRICULAR TACHYCARDIA): ICD-10-CM

## 2022-11-02 DIAGNOSIS — R10.9 ABDOMINAL PAIN, UNSPECIFIED ABDOMINAL LOCATION: ICD-10-CM

## 2022-11-02 DIAGNOSIS — E78.5 DYSLIPIDEMIA: ICD-10-CM

## 2022-11-02 DIAGNOSIS — I10 PRIMARY HYPERTENSION: ICD-10-CM

## 2022-11-02 DIAGNOSIS — N20.0 KIDNEY STONES: ICD-10-CM

## 2022-11-02 DIAGNOSIS — I77.819 AORTIC ECTASIA: ICD-10-CM

## 2022-11-02 DIAGNOSIS — N28.1 KIDNEY CYSTS: ICD-10-CM

## 2022-11-02 DIAGNOSIS — Z78.9 STATIN INTOLERANCE: Primary | ICD-10-CM

## 2022-11-02 DIAGNOSIS — N40.0 BENIGN PROSTATIC HYPERPLASIA WITHOUT LOWER URINARY TRACT SYMPTOMS: ICD-10-CM

## 2022-11-02 PROCEDURE — 1159F MED LIST DOCD IN RCRD: CPT | Mod: CPTII,S$GLB,, | Performed by: INTERNAL MEDICINE

## 2022-11-02 PROCEDURE — 3079F DIAST BP 80-89 MM HG: CPT | Mod: CPTII,S$GLB,, | Performed by: INTERNAL MEDICINE

## 2022-11-02 PROCEDURE — 99214 OFFICE O/P EST MOD 30 MIN: CPT | Mod: S$GLB,,, | Performed by: INTERNAL MEDICINE

## 2022-11-02 PROCEDURE — 99999 PR PBB SHADOW E&M-EST. PATIENT-LVL V: CPT | Mod: PBBFAC,,, | Performed by: INTERNAL MEDICINE

## 2022-11-02 PROCEDURE — 3008F BODY MASS INDEX DOCD: CPT | Mod: CPTII,S$GLB,, | Performed by: INTERNAL MEDICINE

## 2022-11-02 PROCEDURE — 3079F PR MOST RECENT DIASTOLIC BLOOD PRESSURE 80-89 MM HG: ICD-10-PCS | Mod: CPTII,S$GLB,, | Performed by: INTERNAL MEDICINE

## 2022-11-02 PROCEDURE — 3074F SYST BP LT 130 MM HG: CPT | Mod: CPTII,S$GLB,, | Performed by: INTERNAL MEDICINE

## 2022-11-02 PROCEDURE — 3008F PR BODY MASS INDEX (BMI) DOCUMENTED: ICD-10-PCS | Mod: CPTII,S$GLB,, | Performed by: INTERNAL MEDICINE

## 2022-11-02 PROCEDURE — 99214 PR OFFICE/OUTPT VISIT, EST, LEVL IV, 30-39 MIN: ICD-10-PCS | Mod: S$GLB,,, | Performed by: INTERNAL MEDICINE

## 2022-11-02 PROCEDURE — 99999 PR PBB SHADOW E&M-EST. PATIENT-LVL V: ICD-10-PCS | Mod: PBBFAC,,, | Performed by: INTERNAL MEDICINE

## 2022-11-02 PROCEDURE — 3074F PR MOST RECENT SYSTOLIC BLOOD PRESSURE < 130 MM HG: ICD-10-PCS | Mod: CPTII,S$GLB,, | Performed by: INTERNAL MEDICINE

## 2022-11-02 PROCEDURE — 1159F PR MEDICATION LIST DOCUMENTED IN MEDICAL RECORD: ICD-10-PCS | Mod: CPTII,S$GLB,, | Performed by: INTERNAL MEDICINE

## 2022-11-02 NOTE — TELEPHONE ENCOUNTER
Called patient to schedule an appointment with Vascular.  Patient is aware of date, time and location

## 2022-11-02 NOTE — PROGRESS NOTES
Subjective:       Patient ID: Harish Busby is a 57 y.o. male.    Chief Complaint: Results and Abdominal Pain    Right side abdominal discomfort persists.       No more blood in stool-------seen by gi-    Abdominal Pain  Pertinent negatives include no arthralgias, constipation, diarrhea, dysuria, fever, frequency, headaches, hematuria, myalgias, nausea or vomiting.   Past Medical History:   Diagnosis Date    Hyperlipidemia     Hypertension     NCGS (non-celiac gluten sensitivity)     Polycythemia     Statin intolerance     SVT (supraventricular tachycardia)      Past Surgical History:   Procedure Laterality Date    DENTAL SURGERY      dental implant    FINGER SURGERY       Family History   Problem Relation Age of Onset    Hypertension Mother     Cancer Father         cancer - leukemia    Cancer Brother         lung     Social History     Socioeconomic History    Marital status:    Occupational History    Occupation: [employer]   Tobacco Use    Smoking status: Former     Packs/day: 1.00     Years: 5.00     Pack years: 5.00     Types: Cigarettes    Smokeless tobacco: Never    Tobacco comments:     Quit 19 yo   Substance and Sexual Activity    Alcohol use: Not Currently    Drug use: Not Currently    Sexual activity: Not Currently     Review of Systems   Constitutional:  Negative for activity change, appetite change, chills, diaphoresis, fatigue, fever and unexpected weight change.   HENT:  Negative for drooling, ear discharge, ear pain, facial swelling, hearing loss, mouth sores, nosebleeds, postnasal drip, rhinorrhea, sinus pressure, sneezing, sore throat, tinnitus, trouble swallowing and voice change.    Eyes:  Negative for photophobia, redness and visual disturbance.   Respiratory:  Negative for apnea, cough, choking, chest tightness, shortness of breath and wheezing.    Cardiovascular:  Negative for chest pain, palpitations and leg swelling.   Gastrointestinal:  Positive for abdominal pain.  Negative for abdominal distention, anal bleeding, blood in stool, constipation, diarrhea, nausea and vomiting.   Endocrine: Negative for cold intolerance, heat intolerance, polydipsia, polyphagia and polyuria.   Genitourinary:  Negative for difficulty urinating, dysuria, enuresis, flank pain, frequency, genital sores, hematuria and urgency.   Musculoskeletal:  Negative for arthralgias, back pain, gait problem, joint swelling, myalgias, neck pain and neck stiffness.   Skin:  Negative for color change, pallor, rash and wound.   Allergic/Immunologic: Negative for food allergies and immunocompromised state.   Neurological:  Negative for dizziness, tremors, seizures, syncope, facial asymmetry, speech difficulty, weakness, light-headedness, numbness and headaches.   Hematological:  Negative for adenopathy. Does not bruise/bleed easily.   Psychiatric/Behavioral:  Negative for agitation, behavioral problems, confusion, decreased concentration, dysphoric mood, hallucinations, self-injury, sleep disturbance and suicidal ideas. The patient is not nervous/anxious and is not hyperactive.      Objective:      Physical Exam  Vitals and nursing note reviewed.   Constitutional:       General: He is not in acute distress.     Appearance: Normal appearance. He is well-developed. He is not diaphoretic.   HENT:      Head: Normocephalic and atraumatic.      Mouth/Throat:      Pharynx: No oropharyngeal exudate.   Eyes:      General: No scleral icterus.     Pupils: Pupils are equal, round, and reactive to light.   Neck:      Thyroid: No thyromegaly.      Vascular: No carotid bruit or JVD.      Trachea: No tracheal deviation.   Cardiovascular:      Rate and Rhythm: Normal rate and regular rhythm.      Heart sounds: Normal heart sounds.   Pulmonary:      Effort: Pulmonary effort is normal. No respiratory distress.      Breath sounds: Normal breath sounds. No wheezing or rales.   Chest:      Chest wall: No tenderness.   Abdominal:       General: Bowel sounds are normal. There is no distension.      Palpations: Abdomen is soft.      Tenderness: There is no abdominal tenderness. There is no guarding or rebound.   Musculoskeletal:         General: No tenderness. Normal range of motion.      Cervical back: Normal range of motion and neck supple.   Lymphadenopathy:      Cervical: No cervical adenopathy.   Skin:     General: Skin is warm and dry.      Coloration: Skin is not pale.      Findings: No erythema or rash.   Neurological:      Mental Status: He is alert and oriented to person, place, and time.   Psychiatric:         Behavior: Behavior normal.         Thought Content: Thought content normal.         Judgment: Judgment normal.       CMP  Sodium   Date Value Ref Range Status   10/19/2022 137 136 - 145 mmol/L Final     Potassium   Date Value Ref Range Status   10/19/2022 4.2 3.5 - 5.1 mmol/L Final     Chloride   Date Value Ref Range Status   10/19/2022 103 95 - 110 mmol/L Final     CO2   Date Value Ref Range Status   10/19/2022 27 23 - 29 mmol/L Final     Glucose   Date Value Ref Range Status   10/19/2022 77 70 - 110 mg/dL Final     BUN   Date Value Ref Range Status   10/19/2022 16 6 - 20 mg/dL Final     Creatinine   Date Value Ref Range Status   10/19/2022 1.2 0.5 - 1.4 mg/dL Final     Calcium   Date Value Ref Range Status   10/19/2022 9.0 8.7 - 10.5 mg/dL Final     Total Protein   Date Value Ref Range Status   10/19/2022 6.2 6.0 - 8.4 g/dL Final     Albumin   Date Value Ref Range Status   10/19/2022 3.7 3.5 - 5.2 g/dL Final   03/21/2022 4.3 3.6 - 5.1 Final     Total Bilirubin   Date Value Ref Range Status   10/19/2022 0.6 0.1 - 1.0 mg/dL Final     Comment:     For infants and newborns, interpretation of results should be based  on gestational age, weight and in agreement with clinical  observations.    Premature Infant recommended reference ranges:  Up to 24 hours.............<8.0 mg/dL  Up to 48 hours............<12.0 mg/dL  3-5  days..................<15.0 mg/dL  6-29 days.................<15.0 mg/dL       Alkaline Phosphatase   Date Value Ref Range Status   10/19/2022 61 55 - 135 U/L Final     AST   Date Value Ref Range Status   10/19/2022 17 10 - 40 U/L Final     ALT   Date Value Ref Range Status   10/19/2022 31 10 - 44 U/L Final     Anion Gap   Date Value Ref Range Status   10/19/2022 7 (L) 8 - 16 mmol/L Final     eGFR if    Date Value Ref Range Status   03/21/2022 81 >OR=60 mL/min/1.73 m2 Final     eGFR if non    Date Value Ref Range Status   03/21/2022 70.0 >OR=60 mL/min/1.73 m2 Final     Lab Results   Component Value Date    WBC 5.83 10/19/2022    HGB 14.0 10/19/2022    HCT 46.8 10/19/2022    MCV 85 10/19/2022     10/19/2022     Lab Results   Component Value Date    CHOL 216 (H) 03/21/2022     Lab Results   Component Value Date    HDL 50 03/21/2022     Lab Results   Component Value Date    LDLCALC 141 (H) 03/21/2022     Lab Results   Component Value Date    TRIG 126 03/21/2022     Lab Results   Component Value Date    CHOLHDL 4.3 03/21/2022     No results found for: TSH  No results found for: LABA1C, HGBA1C  Assessment:       1. Statin intolerance    2. Primary hypertension    3. Dyslipidemia    4. SVT (supraventricular tachycardia)    5. Kidney cysts    6. Kidney stones    7. Benign prostatic hyperplasia without lower urinary tract symptoms    8. Aortic ectasia    9. Abdominal pain, unspecified abdominal location          Plan:   Statin intolerance    Primary hypertension    Dyslipidemia    SVT (supraventricular tachycardia)    Kidney cysts---------------seen on ct scan------------  -     Ambulatory referral/consult to Urology; Future; Expected date: 11/09/2022    Kidney stones  -     Ambulatory referral/consult to Urology; Future; Expected date: 11/09/2022    Benign prostatic hyperplasia without lower urinary tract symptoms  -     Ambulatory referral/consult to Urology; Future; Expected date:  11/09/2022    Aortic ectasia-------------seen on ct scan--------------  -     Ambulatory referral/consult to Vascular Surgery; Future; Expected date: 11/09/2022    Abdominal pain, unspecified abdominal location-----------------f/u with gi-----------     As above-----------------f/u 1 month-------------------go to er for worsening symptoms---------

## 2022-11-02 NOTE — TELEPHONE ENCOUNTER
----- Message from eDvora Tariq sent at 11/2/2022 10:58 AM CDT -----  Regarding: New Patient Appointment  Good Morning, patient seen by Dr. Alexander Jones, has referral/consult for Aortic ectasia. Please call [phone] to schedule. Thanks/elr

## 2022-11-04 ENCOUNTER — OFFICE VISIT (OUTPATIENT)
Dept: UROLOGY | Facility: CLINIC | Age: 57
End: 2022-11-04
Payer: COMMERCIAL

## 2022-11-04 VITALS
BODY MASS INDEX: 25.91 KG/M2 | WEIGHT: 181 LBS | SYSTOLIC BLOOD PRESSURE: 134 MMHG | HEIGHT: 70 IN | DIASTOLIC BLOOD PRESSURE: 100 MMHG | HEART RATE: 76 BPM

## 2022-11-04 DIAGNOSIS — N20.0 KIDNEY STONES: ICD-10-CM

## 2022-11-04 DIAGNOSIS — N28.1 KIDNEY CYSTS: ICD-10-CM

## 2022-11-04 DIAGNOSIS — N40.0 BENIGN PROSTATIC HYPERPLASIA WITHOUT LOWER URINARY TRACT SYMPTOMS: Primary | ICD-10-CM

## 2022-11-04 DIAGNOSIS — K59.00 CONSTIPATION, UNSPECIFIED CONSTIPATION TYPE: ICD-10-CM

## 2022-11-04 PROCEDURE — 3075F PR MOST RECENT SYSTOLIC BLOOD PRESS GE 130-139MM HG: ICD-10-PCS | Mod: CPTII,S$GLB,, | Performed by: UROLOGY

## 2022-11-04 PROCEDURE — 99999 PR PBB SHADOW E&M-EST. PATIENT-LVL IV: ICD-10-PCS | Mod: PBBFAC,,, | Performed by: UROLOGY

## 2022-11-04 PROCEDURE — 3080F PR MOST RECENT DIASTOLIC BLOOD PRESSURE >= 90 MM HG: ICD-10-PCS | Mod: CPTII,S$GLB,, | Performed by: UROLOGY

## 2022-11-04 PROCEDURE — 1159F MED LIST DOCD IN RCRD: CPT | Mod: CPTII,S$GLB,, | Performed by: UROLOGY

## 2022-11-04 PROCEDURE — 3008F BODY MASS INDEX DOCD: CPT | Mod: CPTII,S$GLB,, | Performed by: UROLOGY

## 2022-11-04 PROCEDURE — 1159F PR MEDICATION LIST DOCUMENTED IN MEDICAL RECORD: ICD-10-PCS | Mod: CPTII,S$GLB,, | Performed by: UROLOGY

## 2022-11-04 PROCEDURE — 99204 OFFICE O/P NEW MOD 45 MIN: CPT | Mod: S$GLB,,, | Performed by: UROLOGY

## 2022-11-04 PROCEDURE — 3008F PR BODY MASS INDEX (BMI) DOCUMENTED: ICD-10-PCS | Mod: CPTII,S$GLB,, | Performed by: UROLOGY

## 2022-11-04 PROCEDURE — 3080F DIAST BP >= 90 MM HG: CPT | Mod: CPTII,S$GLB,, | Performed by: UROLOGY

## 2022-11-04 PROCEDURE — 99204 PR OFFICE/OUTPT VISIT, NEW, LEVL IV, 45-59 MIN: ICD-10-PCS | Mod: S$GLB,,, | Performed by: UROLOGY

## 2022-11-04 PROCEDURE — 99999 PR PBB SHADOW E&M-EST. PATIENT-LVL IV: CPT | Mod: PBBFAC,,, | Performed by: UROLOGY

## 2022-11-04 PROCEDURE — 3075F SYST BP GE 130 - 139MM HG: CPT | Mod: CPTII,S$GLB,, | Performed by: UROLOGY

## 2022-11-04 RX ORDER — TAMSULOSIN HYDROCHLORIDE 0.4 MG/1
0.4 CAPSULE ORAL DAILY
Qty: 90 CAPSULE | Refills: 3 | Status: SHIPPED | OUTPATIENT
Start: 2022-11-04 | End: 2023-02-28

## 2022-11-04 RX ORDER — METHOCARBAMOL 500 MG/1
500 TABLET, FILM COATED ORAL 3 TIMES DAILY PRN
Qty: 30 TABLET | Refills: 0 | Status: SHIPPED | OUTPATIENT
Start: 2022-11-04 | End: 2022-11-14

## 2022-11-04 NOTE — PROGRESS NOTES
Chief Complaint:   Encounter Diagnoses   Name Primary?    Benign prostatic hyperplasia without lower urinary tract symptoms Yes    Kidney cysts     Kidney stones     Constipation, unspecified constipation type        HPI:  57-year-old gentleman who comes in with significant pain over the last 8 weeks along the right upper quadrant.  States that he initially felt some numbness to this side but now appears to be getting pain, not intermittent but constant.  His history of cysts on the left now 1 on the right and he is concerned that this cyst is causing compression of the liver.  Patient states the pain is worse during the day, with no real relief.  He has known constipation which she eyes attempting to control.  Recent CT scan which was done after GoLYTELY bowel prep.  Still having significant dilatation redundancy of the right colon and hepatic flexure.  He has never had anything such as this before.  No gross hematuria, does have a history of smoking, no other urological history.  He gets up 2 times per evening to void, he does have increased frequency with decreased flow with split stream during the daytime.  He quit some of this to his hydration status, no evidence of urgency.  Patient states that he has had chronic constipation his whole life.  Reported colonoscopy few years ago with polyps.    Allergies:  Milk, Wheat containing prod, Egg white, Statins-hmg-coa reductase inhibitors, Adhesive, and Polyester fibers    Medications:  has a current medication list which includes the following prescription(s): anastrozole, calcium carbonate-vitamin d3, dicyclomine, ezetimibe, gemfibrozil, metoprolol succinate, prednisone, trazodone, and zolpidem.    Review of Systems:  General: No fever, chills, fatigability, or weight loss.  Skin: No rashes, itching, or changes in color or texture of skin.  Chest: Denies CARBAJAL, cyanosis, wheezing, cough, and sputum production.  Abdomen: Appetite fine. No weight loss. Denies diarrhea,  abdominal pain, hematemesis, or blood in stool.  Musculoskeletal: No joint stiffness or swelling. Denies back pain.  : As above.  All other review of systems negative.    PMH:   has a past medical history of Hyperlipidemia, Hypertension, NCGS (non-celiac gluten sensitivity), Polycythemia, Statin intolerance, and SVT (supraventricular tachycardia).    PSH:   has a past surgical history that includes Finger surgery and Dental surgery.    FamHx: family history includes Cancer in his brother and father; Hypertension in his mother.    SocHx:  reports that he has quit smoking. His smoking use included cigarettes. He has a 5.00 pack-year smoking history. He has never used smokeless tobacco. He reports that he does not currently use alcohol. He reports that he does not currently use drugs.      Physical Exam:  Vitals:    11/04/22 1046   BP: (!) 134/100   Pulse: 76     General: A&Ox3, no apparent distress, no deformities  Neck: No masses, normal ROM  Lungs: normal inspiration, no use of accessory muscles  Heart: normal pulse, no arrhythmias  Abdomen: Soft, tender right upper quadrant, slight costovertebral angle tenderness on the right, ND, no masses,   Skin: The skin is warm and dry. No jaundice.  Ext: No c/c/e.    Labs/Studies:   CT with contrast 4.7 cm right renal cyst, 1.3 cm left renal cyst 10/22   KUB possible small punctate stones on the right 10/22   Creatinine 1.2 10/22   PSA 1.02 10/22    Impression/Plan:     1. Right renal cyst/right upper quadrant pain- based on the CT I am concerned that this pain is not due to the cyst but due to a bowel source.  CT was done after GoLYTELY bowel prep with still redundancy and distension to the right colon and hepatic flexure, consistent with his site of pain.  Will consult Interventional Radiology for drainage to see if this assists, have again reinforced with the patient that I do not believe that this is our definitive etiology, but have seen renal cysts cause discomfort  before.  In addition will consult colorectal surgery for evaluation.  Robaxin for muscle spasms demonstrated on physical examination today to see if this also alleviates some of his discomfort.  Would not prescribe narcotics due to constipation.      2. BPH- early findings and will initiate tamsulosin.  I have informed him this may cause orthostatic hypotension and dizziness, have asked him to stop the medication if this occurs and contact our office.  Otherwise re-evaluate at future appointments.    3. Nephrolithiasis- small and nonobstructing, will monitor.

## 2022-11-07 ENCOUNTER — TELEPHONE (OUTPATIENT)
Dept: SURGERY | Facility: CLINIC | Age: 57
End: 2022-11-07
Payer: COMMERCIAL

## 2022-11-07 NOTE — TELEPHONE ENCOUNTER
Spoke with patient to schedule appointment.     ----- Message from Tash Lim MA sent at 11/4/2022  3:30 PM CDT -----  Reyes Mann can you schedule this patient to see Dr. Haile for Constipation. He has a referral. I tried but I could not schedule it.

## 2022-11-08 ENCOUNTER — PATIENT MESSAGE (OUTPATIENT)
Dept: UROLOGY | Facility: CLINIC | Age: 57
End: 2022-11-08
Payer: COMMERCIAL

## 2022-11-08 ENCOUNTER — TELEPHONE (OUTPATIENT)
Dept: SURGERY | Facility: CLINIC | Age: 57
End: 2022-11-08
Payer: COMMERCIAL

## 2022-11-08 NOTE — TELEPHONE ENCOUNTER
Scheduled patient's appointment with Dr Ha.     ----- Message from Teddy Pastrana sent at 11/8/2022 10:01 AM CST -----  Contact: Pt [phone]  The patient said someone cancelled his appointment with you by mistake, please call him to reschedule.    Thank you

## 2022-11-10 ENCOUNTER — TELEPHONE (OUTPATIENT)
Dept: RADIOLOGY | Facility: HOSPITAL | Age: 57
End: 2022-11-10
Payer: COMMERCIAL

## 2022-11-10 DIAGNOSIS — N28.1 KIDNEY CYSTS: Primary | ICD-10-CM

## 2022-11-10 NOTE — TELEPHONE ENCOUNTER
Interventional Radiology:    Spoke with pt to schedule cyst aspiration for 11/15 @ 10:30am.  Denies taking any aspirin, blood thinners or fish oil.  Aware that he will need to be NPO after midnight the morning of procedure, arrival time of 9:30am and will need someone to drive him home once discharged.

## 2022-11-14 ENCOUNTER — TELEPHONE (OUTPATIENT)
Dept: RADIOLOGY | Facility: HOSPITAL | Age: 57
End: 2022-11-14

## 2022-11-14 DIAGNOSIS — N28.1 KIDNEY CYSTS: Primary | ICD-10-CM

## 2022-11-14 DIAGNOSIS — D68.9 COAGULATION DEFECT: ICD-10-CM

## 2022-11-14 NOTE — TELEPHONE ENCOUNTER
INTERVENTIONAL RADIOLOGY    CONFIRMED 1030 APPOINTMENT ON 11/15/22 WITH MR. BERNARD HERNANDEZ. INSTRUCTED PT ARRIVE @ 0930 TO FRONT ENTRANCE OF HOSPITAL ([address] [facility] [address]) WITH  FOR DISCHARGE.  PT DENIES BT, ASA, FISH OIL AND NSAIDS.  INSTRUCTED NOT TO EAT OR DRINK ANYTHING AFTER MIDNIGHT  THE NIGHT PRIOR TO PROCEDURE AND HAVE A  ON MORNING OF PROCEDURE TO DRIVE HOME.  QUESTIONS ANSWERED.  INSTRUCTED PT MAY TAKE NECESSARY MEDICATIONS ON MORNING OF PROCEDURE WITH A SMALL SIP OF WATER.

## 2022-11-15 ENCOUNTER — HOSPITAL ENCOUNTER (OUTPATIENT)
Dept: RADIOLOGY | Facility: HOSPITAL | Age: 57
Discharge: HOME | End: 2022-11-15
Attending: UROLOGY
Payer: COMMERCIAL

## 2022-11-15 VITALS
DIASTOLIC BLOOD PRESSURE: 79 MMHG | SYSTOLIC BLOOD PRESSURE: 122 MMHG | WEIGHT: 175 LBS | OXYGEN SATURATION: 96 % | HEART RATE: 66 BPM | BODY MASS INDEX: 25.05 KG/M2 | HEIGHT: 70 IN | RESPIRATION RATE: 16 BRPM

## 2022-11-15 DIAGNOSIS — N28.1 KIDNEY CYSTS: ICD-10-CM

## 2022-11-15 PROCEDURE — 77012 CT SCAN FOR NEEDLE BIOPSY: CPT | Mod: TC

## 2022-11-15 PROCEDURE — 88112 CYTOPATH CELL ENHANCE TECH: CPT | Performed by: STUDENT IN AN ORGANIZED HEALTH CARE EDUCATION/TRAINING PROGRAM

## 2022-11-15 PROCEDURE — 88112 PR  CYTOPATH, CELL ENHANCE TECH: ICD-10-PCS | Mod: 26,,, | Performed by: STUDENT IN AN ORGANIZED HEALTH CARE EDUCATION/TRAINING PROGRAM

## 2022-11-15 PROCEDURE — 88112 CYTOPATH CELL ENHANCE TECH: CPT | Mod: 26,,, | Performed by: STUDENT IN AN ORGANIZED HEALTH CARE EDUCATION/TRAINING PROGRAM

## 2022-11-15 PROCEDURE — 63600175 PHARM REV CODE 636 W HCPCS: Performed by: RADIOLOGY

## 2022-11-15 RX ORDER — FENTANYL CITRATE 50 UG/ML
INJECTION, SOLUTION INTRAMUSCULAR; INTRAVENOUS CODE/TRAUMA/SEDATION MEDICATION
Status: COMPLETED | OUTPATIENT
Start: 2022-11-15 | End: 2022-11-15

## 2022-11-15 RX ORDER — MIDAZOLAM HYDROCHLORIDE 1 MG/ML
INJECTION INTRAMUSCULAR; INTRAVENOUS CODE/TRAUMA/SEDATION MEDICATION
Status: COMPLETED | OUTPATIENT
Start: 2022-11-15 | End: 2022-11-15

## 2022-11-15 RX ADMIN — MIDAZOLAM HYDROCHLORIDE 0.5 MG: 1 INJECTION INTRAMUSCULAR; INTRAVENOUS at 10:11

## 2022-11-15 RX ADMIN — FENTANYL CITRATE 25 MCG: 50 INJECTION, SOLUTION INTRAMUSCULAR; INTRAVENOUS at 10:11

## 2022-11-15 RX ADMIN — FENTANYL CITRATE 50 MCG: 50 INJECTION, SOLUTION INTRAMUSCULAR; INTRAVENOUS at 10:11

## 2022-11-15 RX ADMIN — MIDAZOLAM HYDROCHLORIDE 1 MG: 1 INJECTION INTRAMUSCULAR; INTRAVENOUS at 10:11

## 2022-11-15 NOTE — SEDATION DOCUMENTATION
Pt transferred to [facility] and transported to recovery area. Bedside report given to Tory BARRETO. Bandaid to procedure site remains CDI. VSS. Pt's wife brought to bedside. Pt was stable at time of transfer.

## 2022-11-15 NOTE — SEDATION DOCUMENTATION
Pt positioned right side lying/semi-prone on CT table. CM applied to patient and VS taken. Pt verbalized understanding of procedure.

## 2022-11-15 NOTE — DISCHARGE INSTRUCTIONS
Please return to ER if any of these symptoms occur:  Fever over 101 degrees,  Bleeding from the puncture site not controlled,  Pain not controlled with Aleve or Tylenol,    No driving for 24 hours after procedure due to sedation given during procedure.     Do not submerge in standing water for 2 days after biopsy but you may shower.    Resume home medications and diet    Cyst results will be with Dr. Moore in 5-7 days, please follow up with him for results and any other questions or concerns that you may have.

## 2022-11-15 NOTE — DISCHARGE SUMMARY
[facility])  Discharge Note  Short Stay    CT Renal Cyst Aspiration      OUTCOME: Patient tolerated treatment/procedure well without complication and is now ready for discharge.    DISPOSITION: Home or Self Care    FINAL DIAGNOSIS:  <principal problem not specified>    FOLLOWUP: In clinic    DISCHARGE INSTRUCTIONS:  No discharge procedures on file.      Clinical Reference Documents Added to Patient Instructions         Document    KIDNEY BIOPSY (ENGLISH)    PROCEDURAL SEDATION, ADULT ED (ENGLISH)            TIME SPENT ON DISCHARGE: 15 minutes    Pre Op Diagnosis: Right renal cyst     Post Op Diagnosis: same     Procedure:  drainage     Procedure performed by: Real ROSENTHAL, Hima AHUJA     Written Informed Consent Obtained: Yes     Specimen Removed:  yes     Estimated Blood Loss:  minimal     Findings: Local anesthesia and moderate sedation were used.     The patient tolerated the procedure well and there were no complications.      Disposition:  F/U in clinic or with ordering physician    Discharge instructions:  Light activity for 24 hours.  Remove band aid in 24 hours.  No baths (showers are appropriate).      Sterile technique was performed in the right flank, lidocaine was used as a local anesthetic. 50 ccs of light pat fluid removed and sent to lab.  Pt tolerated the procedure well without immediate complications.  Please see radiologist report for details. F/u with PCP and/or ordering physician.

## 2022-11-15 NOTE — PLAN OF CARE
Band aid to right back puncture site C/D/I with no bleeding/redness/swelling noted. VSS, NADN, and pt meets criteria for discharge. Discharge instructions given to and reviewed with pt, and pt verbalized understanding of all. Pt discharged to home, taken out via wheelchair and driven home by wife.

## 2022-11-15 NOTE — SEDATION DOCUMENTATION
Procedure completed at this time. Pt tolerated well. Bandaid to right low back CDI. VSS. Pt reports 3/10 tolerable pain. Will continue to monitor. 30 minute expected recovery time, per Dr. Noble.

## 2022-11-18 LAB
FINAL PATHOLOGIC DIAGNOSIS: NORMAL
Lab: NORMAL

## 2022-11-30 ENCOUNTER — OFFICE VISIT (OUTPATIENT)
Dept: CARDIOLOGY | Facility: CLINIC | Age: 57
End: 2022-11-30
Payer: COMMERCIAL

## 2022-11-30 ENCOUNTER — HOSPITAL ENCOUNTER (OUTPATIENT)
Dept: CARDIOLOGY | Facility: HOSPITAL | Age: 57
Discharge: HOME | End: 2022-11-30
Attending: INTERNAL MEDICINE
Payer: COMMERCIAL

## 2022-11-30 ENCOUNTER — INITIAL CONSULT (OUTPATIENT)
Dept: VASCULAR SURGERY | Facility: CLINIC | Age: 57
End: 2022-11-30
Payer: COMMERCIAL

## 2022-11-30 ENCOUNTER — HOSPITAL ENCOUNTER (OUTPATIENT)
Dept: RADIOLOGY | Facility: HOSPITAL | Age: 57
Discharge: HOME | End: 2022-11-30
Attending: SURGERY
Payer: COMMERCIAL

## 2022-11-30 VITALS — WEIGHT: 182.56 LBS | BODY MASS INDEX: 26.19 KG/M2

## 2022-11-30 VITALS
HEART RATE: 87 BPM | HEIGHT: 70 IN | SYSTOLIC BLOOD PRESSURE: 120 MMHG | DIASTOLIC BLOOD PRESSURE: 82 MMHG | BODY MASS INDEX: 26.05 KG/M2 | OXYGEN SATURATION: 100 % | WEIGHT: 182 LBS

## 2022-11-30 DIAGNOSIS — I71.43 INFRARENAL ABDOMINAL AORTIC ANEURYSM (AAA) WITHOUT RUPTURE: Primary | ICD-10-CM

## 2022-11-30 DIAGNOSIS — I70.0 AORTIC ATHEROSCLEROSIS: ICD-10-CM

## 2022-11-30 DIAGNOSIS — I71.43 INFRARENAL ABDOMINAL AORTIC ANEURYSM (AAA) WITHOUT RUPTURE: ICD-10-CM

## 2022-11-30 DIAGNOSIS — Z76.89 ESTABLISHING CARE WITH NEW DOCTOR, ENCOUNTER FOR: Primary | ICD-10-CM

## 2022-11-30 DIAGNOSIS — Z01.810 PREOP CARDIOVASCULAR EXAM: Primary | ICD-10-CM

## 2022-11-30 DIAGNOSIS — E78.5 DYSLIPIDEMIA: ICD-10-CM

## 2022-11-30 DIAGNOSIS — I73.9 PVD (PERIPHERAL VASCULAR DISEASE): ICD-10-CM

## 2022-11-30 DIAGNOSIS — I47.10 SVT (SUPRAVENTRICULAR TACHYCARDIA): ICD-10-CM

## 2022-11-30 DIAGNOSIS — D75.1 POLYCYTHEMIA: ICD-10-CM

## 2022-11-30 DIAGNOSIS — K90.41 NCGS (NON-CELIAC GLUTEN SENSITIVITY): ICD-10-CM

## 2022-11-30 DIAGNOSIS — I10 PRIMARY HYPERTENSION: ICD-10-CM

## 2022-11-30 DIAGNOSIS — Z78.9 STATIN INTOLERANCE: ICD-10-CM

## 2022-11-30 DIAGNOSIS — I77.819 AORTIC ECTASIA: ICD-10-CM

## 2022-11-30 DIAGNOSIS — Z01.818 PRE-OP TESTING: Primary | ICD-10-CM

## 2022-11-30 DIAGNOSIS — Z76.89 ESTABLISHING CARE WITH NEW DOCTOR, ENCOUNTER FOR: ICD-10-CM

## 2022-11-30 DIAGNOSIS — N40.0 BENIGN PROSTATIC HYPERPLASIA WITHOUT LOWER URINARY TRACT SYMPTOMS: ICD-10-CM

## 2022-11-30 DIAGNOSIS — K59.00 CONSTIPATION, UNSPECIFIED CONSTIPATION TYPE: ICD-10-CM

## 2022-11-30 PROCEDURE — 99205 PR OFFICE/OUTPT VISIT, NEW, LEVL V, 60-74 MIN: ICD-10-PCS | Mod: S$GLB,,, | Performed by: SURGERY

## 2022-11-30 PROCEDURE — 1159F PR MEDICATION LIST DOCUMENTED IN MEDICAL RECORD: ICD-10-PCS | Mod: CPTII,S$GLB,, | Performed by: INTERNAL MEDICINE

## 2022-11-30 PROCEDURE — 99999 PR PBB SHADOW E&M-EST. PATIENT-LVL IV: CPT | Mod: PBBFAC,,, | Performed by: INTERNAL MEDICINE

## 2022-11-30 PROCEDURE — 3008F BODY MASS INDEX DOCD: CPT | Mod: CPTII,S$GLB,, | Performed by: INTERNAL MEDICINE

## 2022-11-30 PROCEDURE — 93010 EKG 12-LEAD: ICD-10-PCS | Mod: ,,, | Performed by: INTERNAL MEDICINE

## 2022-11-30 PROCEDURE — 93010 ELECTROCARDIOGRAM REPORT: CPT | Mod: ,,, | Performed by: INTERNAL MEDICINE

## 2022-11-30 PROCEDURE — 3079F DIAST BP 80-89 MM HG: CPT | Mod: CPTII,S$GLB,, | Performed by: INTERNAL MEDICINE

## 2022-11-30 PROCEDURE — 3074F SYST BP LT 130 MM HG: CPT | Mod: CPTII,S$GLB,, | Performed by: INTERNAL MEDICINE

## 2022-11-30 PROCEDURE — 25500020 PHARM REV CODE 255: Performed by: SURGERY

## 2022-11-30 PROCEDURE — 99999 PR PBB SHADOW E&M-EST. PATIENT-LVL IV: ICD-10-PCS | Mod: PBBFAC,,, | Performed by: SURGERY

## 2022-11-30 PROCEDURE — 93005 ELECTROCARDIOGRAM TRACING: CPT

## 2022-11-30 PROCEDURE — 99205 PR OFFICE/OUTPT VISIT, NEW, LEVL V, 60-74 MIN: ICD-10-PCS | Mod: S$GLB,,, | Performed by: INTERNAL MEDICINE

## 2022-11-30 PROCEDURE — 1159F MED LIST DOCD IN RCRD: CPT | Mod: CPTII,S$GLB,, | Performed by: INTERNAL MEDICINE

## 2022-11-30 PROCEDURE — 75635 CT ANGIO ABDOMINAL ARTERIES: CPT | Mod: TC

## 2022-11-30 PROCEDURE — 3008F PR BODY MASS INDEX (BMI) DOCUMENTED: ICD-10-PCS | Mod: CPTII,S$GLB,, | Performed by: INTERNAL MEDICINE

## 2022-11-30 PROCEDURE — 3079F PR MOST RECENT DIASTOLIC BLOOD PRESSURE 80-89 MM HG: ICD-10-PCS | Mod: CPTII,S$GLB,, | Performed by: INTERNAL MEDICINE

## 2022-11-30 PROCEDURE — 99999 PR PBB SHADOW E&M-EST. PATIENT-LVL IV: ICD-10-PCS | Mod: PBBFAC,,, | Performed by: INTERNAL MEDICINE

## 2022-11-30 PROCEDURE — 99205 OFFICE O/P NEW HI 60 MIN: CPT | Mod: S$GLB,,, | Performed by: SURGERY

## 2022-11-30 PROCEDURE — 99999 PR PBB SHADOW E&M-EST. PATIENT-LVL IV: CPT | Mod: PBBFAC,,, | Performed by: SURGERY

## 2022-11-30 PROCEDURE — 3074F PR MOST RECENT SYSTOLIC BLOOD PRESSURE < 130 MM HG: ICD-10-PCS | Mod: CPTII,S$GLB,, | Performed by: INTERNAL MEDICINE

## 2022-11-30 PROCEDURE — 99205 OFFICE O/P NEW HI 60 MIN: CPT | Mod: S$GLB,,, | Performed by: INTERNAL MEDICINE

## 2022-11-30 RX ORDER — EVOLOCUMAB 140 MG/ML
140 INJECTION, SOLUTION SUBCUTANEOUS
Qty: 6 ML | Refills: 4 | Status: SHIPPED | OUTPATIENT
Start: 2022-11-30 | End: 2023-07-27

## 2022-11-30 RX ADMIN — IOHEXOL 100 ML: 350 INJECTION, SOLUTION INTRAVENOUS at 01:11

## 2022-11-30 NOTE — PROGRESS NOTES
Subjective:   Patient ID:  Harish Busby is a 57 y.o. male who presents for evaluation of No chief complaint on file.      HPI  A 56 yo male with svt hyperlipidemia htn statin intolerance is here for preop eval. He was seen by DR BONILLA DUE TO ABDOMINAL ANEURYSM SACCULAR 2.5 CM WITH FOCAL DISSECTION. HE HAS ABDOMINAL BLOATING HE DOES ABDOMINAL EXERCISE HAD RT SIDED LOWER ABDOMINAL FLA\NK PAIN HAD DRAINAGE OF LARGE RENAL CYST W/O RELIEF. HE HAS SIGNIFICANT FECAL MATERIAL IN COLON. HE IS TAKING MIRALAX FOR CONSTIPATION. HAS NO REGULAR EXERCISE. HE USED TOW ALK AND EXERCISES EARLIER IN THE YEAR. HAS QUIT SMOKING AT THE AGE OF 20 NO FH OF ANEURYSM. HE IS ALLERGIC TO STATINS.   HAS SVT REASONABLY CONTROLLED WITH B BLOCKERS.    HE HAD A W/U AT [facility] 2 YEARS AGO WITH HOLTER ECHO AND STRESS TEST.   Past Medical History:   Diagnosis Date    Hyperlipidemia     Hypertension     NCGS (non-celiac gluten sensitivity)     Polycythemia     Preop cardiovascular exam 11/30/2022    Statin intolerance     Supraventricular tachycardia     SVT (supraventricular tachycardia)        Past Surgical History:   Procedure Laterality Date    DENTAL SURGERY      dental implant    FINGER SURGERY         Social History     Tobacco Use    Smoking status: Former     Packs/day: 1.00     Years: 5.00     Pack years: 5.00     Types: Cigarettes    Smokeless tobacco: Never    Tobacco comments:     Quit 19 yo   Substance Use Topics    Alcohol use: Not Currently    Drug use: Not Currently       Family History   Problem Relation Age of Onset    Hypertension Mother     Cancer Father         cancer - leukemia    Cancer Brother         lung       Current Outpatient Medications   Medication Sig    anastrozole (ARIMIDEX) 1 mg Tab Take 1 tablet (1 mg total) by mouth once daily.    dicyclomine (BENTYL) 20 mg tablet TAKE 1 TABLET BY MOUTH THREE TIMES DAILY AS NEEDED FOR ABDOMINAL PAIN    ezetimibe (ZETIA) 10 mg tablet Take 1 tablet (10 mg total) by mouth once  daily.    metoprolol succinate (TOPROL-XL) 25 MG 24 hr tablet Take 2 tablets (50 mg total) by mouth once daily.    predniSONE (DELTASONE) 20 MG tablet prednisone 20 mg tablet  TAKE 1 TABLET BY MOUTH ONCE DAILY AS NEEDED Strength: 20 mg    tamsulosin (FLOMAX) 0.4 mg Cap Take 1 capsule (0.4 mg total) by mouth once daily.    traZODone (DESYREL) 50 MG tablet 1 tablet nightly as needed.    zolpidem (AMBIEN) 10 mg Tab Take 1 tablet (10 mg total) by mouth nightly as needed (insomnia).    calcium carbonate-vitamin D3 (CALCIUM 600 WITH VITAMIN D3) 600 mg-10 mcg (400 unit) Chew     gemfibroziL (LOPID) 600 MG tablet Take 1 tablet (600 mg total) by mouth 2 (two) times daily before meals.     No current facility-administered medications for this visit.     Current Outpatient Medications on File Prior to Visit   Medication Sig    anastrozole (ARIMIDEX) 1 mg Tab Take 1 tablet (1 mg total) by mouth once daily.    dicyclomine (BENTYL) 20 mg tablet TAKE 1 TABLET BY MOUTH THREE TIMES DAILY AS NEEDED FOR ABDOMINAL PAIN    ezetimibe (ZETIA) 10 mg tablet Take 1 tablet (10 mg total) by mouth once daily.    metoprolol succinate (TOPROL-XL) 25 MG 24 hr tablet Take 2 tablets (50 mg total) by mouth once daily.    predniSONE (DELTASONE) 20 MG tablet prednisone 20 mg tablet  TAKE 1 TABLET BY MOUTH ONCE DAILY AS NEEDED Strength: 20 mg    tamsulosin (FLOMAX) 0.4 mg Cap Take 1 capsule (0.4 mg total) by mouth once daily.    traZODone (DESYREL) 50 MG tablet 1 tablet nightly as needed.    zolpidem (AMBIEN) 10 mg Tab Take 1 tablet (10 mg total) by mouth nightly as needed (insomnia).    calcium carbonate-vitamin D3 (CALCIUM 600 WITH VITAMIN D3) 600 mg-10 mcg (400 unit) Chew     gemfibroziL (LOPID) 600 MG tablet Take 1 tablet (600 mg total) by mouth 2 (two) times daily before meals.     No current facility-administered medications on file prior to visit.       Review of patient's allergies indicates:   Allergen Reactions    Milk Other (See Comments)     Wheat containing prod Hives, Rash, Shortness Of Breath and Swelling    Egg white     Statins-hmg-coa reductase inhibitors Other (See Comments)    Adhesive Rash    Polyester fibers Rash     Review of patient's allergies indicates:   Allergen Reactions    Milk Other (See Comments)    Wheat containing prod Hives, Rash, Shortness Of Breath and Swelling    Egg white     Statins-hmg-coa reductase inhibitors Other (See Comments)    Adhesive Rash    Polyester fibers Rash      Review of Systems   Constitutional: Negative for malaise/fatigue.   Eyes:  Negative for blurred vision.   Cardiovascular:  Negative for chest pain, claudication, cyanosis, dyspnea on exertion, irregular heartbeat, leg swelling, near-syncope, orthopnea, palpitations and paroxysmal nocturnal dyspnea.   Respiratory:  Negative for cough, hemoptysis and shortness of breath.    Hematologic/Lymphatic: Negative for bleeding problem. Does not bruise/bleed easily.   Skin:  Negative for dry skin and itching.   Musculoskeletal:  Negative for falls, muscle weakness and myalgias.   Gastrointestinal:  Negative for abdominal pain, diarrhea, heartburn, hematemesis, hematochezia and melena.   Genitourinary:  Negative for flank pain and hematuria.   Neurological:  Negative for dizziness, focal weakness, headaches, light-headedness, numbness, paresthesias, seizures and weakness.   Psychiatric/Behavioral:  Negative for altered mental status and memory loss. The patient is not nervous/anxious.    Allergic/Immunologic: Negative for hives.     Objective:   Physical Exam  Vitals and nursing note reviewed.   Constitutional:       General: He is not in acute distress.     Appearance: He is well-developed. He is not diaphoretic.   HENT:      Head: Normocephalic and atraumatic.   Eyes:      General:         Right eye: No discharge.         Left eye: No discharge.      Pupils: Pupils are equal, round, and reactive to light.   Neck:      Thyroid: No thyromegaly.      Vascular: No  "JVD.   Cardiovascular:      Rate and Rhythm: Normal rate and regular rhythm.      Pulses: Normal pulses and intact distal pulses.      Heart sounds: Normal heart sounds. No murmur heard.    No friction rub. No gallop.   Pulmonary:      Effort: Pulmonary effort is normal. No respiratory distress.      Breath sounds: Normal breath sounds. No wheezing or rales.   Chest:      Chest wall: No tenderness.   Abdominal:      General: Bowel sounds are normal. There is no distension.      Palpations: Abdomen is soft.      Tenderness: There is no abdominal tenderness.   Musculoskeletal:         General: Normal range of motion.      Cervical back: Neck supple.      Right lower leg: No edema.      Left lower leg: No edema.   Skin:     General: Skin is warm and dry.      Findings: No erythema or rash.   Neurological:      Mental Status: He is alert and oriented to person, place, and time.      Cranial Nerves: No cranial nerve deficit.   Psychiatric:         Behavior: Behavior normal.         Judgment: Judgment normal.     Vitals:    11/30/22 1517 11/30/22 1520   BP: 124/80 120/82   BP Location: Right arm Left arm   Patient Position: Sitting Sitting   BP Method: Medium (Manual) Medium (Manual)   Pulse: 87    SpO2: 100%    Weight: 82.6 kg (182 lb)    Height: 5' 10" (1.778 m)      Lab Results   Component Value Date    CHOL 216 (H) 03/21/2022     Lab Results   Component Value Date    HDL 50 03/21/2022     Lab Results   Component Value Date    LDLCALC 141 (H) 03/21/2022     Lab Results   Component Value Date    TRIG 126 03/21/2022     Lab Results   Component Value Date    CHOLHDL 4.3 03/21/2022       Chemistry        Component Value Date/Time     10/19/2022 1053    K 4.2 10/19/2022 1053     10/19/2022 1053    CO2 27 10/19/2022 1053    BUN 16 10/19/2022 1053    CREATININE 1.2 11/30/2022 1156    GLU 77 10/19/2022 1053        Component Value Date/Time    CALCIUM 9.0 10/19/2022 1053    ALKPHOS 61 10/19/2022 1053    AST 17 " 10/19/2022 1053    ALT 31 10/19/2022 1053    BILITOT 0.6 10/19/2022 1053    ESTGFRAFRICA 81 03/21/2022 0000    EGFRNONAA 70.0 03/21/2022 0000          No results found for: TSH  Lab Results   Component Value Date    INR 1.0 11/15/2022     Lab Results   Component Value Date    WBC 5.83 10/19/2022    HGB 14.0 10/19/2022    HCT 46.8 10/19/2022    MCV 85 10/19/2022     10/19/2022     BNP  @LABRCNTIP(BNP,BNPTRIAGEBLO)@  Estimated Creatinine Clearance: 69.3 mL/min (based on SCr of 1.2 mg/dL).  Narrative & Impression  EXAMINATION:  CTA RUNOFF ABD PEL BILAT LOWER EXT     CLINICAL HISTORY:  CTA RUNOFF ABD PEL BILAT LOWER EXTInfrarenal abdominal aortic aneurysm, without rupture     TECHNIQUE:  Using 100 cc of  Omni 350 IV contrast, and multi-detector helical CT technique, axial CT angiogram images of the abdomen/pelvis with lower extremity runoff.  2D post-processing coronal and sagittal reconstructions of the abdominal aorta, visceral arteries, and lower extremities performed.     COMPARISON:  11/15/2022, 10/31/2022     FINDINGS:  CTA images demonstrate moderate scattered atherosclerotic disease.  Origins of the celiac, SMA, bilateral single renal arteries and SREEDHAR appear patent.     Infrarenal aorta demonstrates mildly ectatic aorta measuring up to 2.5 cm with a focal non flow limiting dissection seen from images 373 through 410.     Bilateral lower extremity runoff demonstrates ectasia of the right common iliac artery measuring up to 13 mm.  50% narrowing seen within the right external iliac artery.  Remaining bilateral arteries of the bilateral lower extremity appear patent without significant stenosis with normal 3 vessel runoff to the ankle.     The lung bases are unremarkable.  There is no pleural fluid present.  The visualized portions of the heart appear normal.     The liver is normal in size and attenuation with no focal hepatic abnormality.  The gallbladder shows no evidence of stones or pericholecystic  fluid.  There is no intra-or extrahepatic biliary ductal dilatation.     The stomach, spleen, pancreas, and adrenal glands are unremarkable.     Mild renal cortical thinning bilaterally.  Multiple cysts are seen within both kidneys largest within the upper pole the right kidney measuring 3.1 cm.  There is no evidence of hydronephrosis.  The ureters appear normal in course and caliber without evidence of ureteral dilatation. The urinary bladder demonstrates no significant abnormality.     The visualized loops of small and large bowel show no evidence of obstruction or inflammation.  Mild constipation.  There is no ascites, free fluid, or intraperitoneal free air. There is no evidence of lymph node enlargement in the abdomen or pelvis.     When viewed with bone windows the osseous structures are unremarkable.  Mild scattered degenerative changes     Impression:     Infrarenal aorta demonstrates mildly ectatic aorta measuring up to 2.5 cm with a focal non flow limiting dissection versus small saccular aneurysm seen from images 373 through 410.  Coronal images have appearance of saccular aneurysm.     All CT scans at this facility use dose modulation, iterative reconstruction, and/or weight based dosing when appropriate to reduce radiation dose to as low as reasonable achievable.        Electronically signed by: Leonides Zamora MD  Date:                                            11/30/2022  Time:                                           14:26    Assessment:     1. Preop cardiovascular exam    2. Infrarenal abdominal aortic aneurysm (AAA) without rupture    3. SVT (supraventricular tachycardia)    4. Polycythemia    5. NCGS (non-celiac gluten sensitivity)    6. Statin intolerance    7. Primary hypertension    8. Dyslipidemia    9. Benign prostatic hyperplasia without lower urinary tract symptoms    10. Aortic ectasia    11. Constipation, unspecified constipation type      PREOP WISE HE HAD A CARDIAC W/U BY HIS REPORT  NEGATIVE FOR ISCHEMIA HE AHS A NORMAL EKG  HE HAS GOOD EXERCISE TOLERANCE IS I SEE NO CONTRAINDICATION TO PROCEED.   SVT REASONABLY CONTROLLED WITH B BLOCKERS CONTINUE SAME    HTN CONTROLLED LOW SALT DIET ADVISED.    HLP ALLERGIC TO STATINS. WAS ON PRALUENT BEFORE ? SIDE EFFECTS. ZETIA IS SUBOPTIMAL WITH HIS LIPID PROFILE   HE HAS ASYMPTOMATIC PVD ILIAC ECTASIA AND AAA NEEDS LDL LESS THAN 70. AND ASA. NOT SURE   ABDOMINAL PAIN MAY BE RELATED TO HIS CONSTIPATION GLUTEN SENSITIVITY IRRITABLE BOWEL.   Plan:   REPATHA   ASA    GET OLD RECORDS    NO CARDIAC CONTRAINDICATION FOR SURGERY.  FURTHER RECOMMENDATION TO FOLLOW PENDING REVIEW OF CARDIAC W/U

## 2022-11-30 NOTE — PROGRESS NOTES
[facility] Vascular Surgery  Congenital Cardiovascular Surgery  Consult Note    Patient Name: Harish Busby  MRN: [id number]  Admission Date: (Not on file)  Hospital Length of Stay: 0 days   Attending Physician: No att. providers found  Primary Care Provider: Kaila West MD    Patient information was obtained from patient.     Consults  Subjective:     Chief Complaint aortic aneurysm    History of Present Illness:  57-year-old male presents today for incidentally found infrarenal abdominal aortic saccular aneurysm.  Patient has been worked up by primary care for persistent left-sided lower back pain.  Was found to have a large right renal cyst which was aspirated which provided no relief from his back pain.  CT scan did show a 2.5 cm saccular infrarenal abdominal aortic aneurysm.     Of note patient reports a polyester allergy    Current Outpatient Medications   Medication    anastrozole (ARIMIDEX) 1 mg Tab    dicyclomine (BENTYL) 20 mg tablet    ezetimibe (ZETIA) 10 mg tablet    metoprolol succinate (TOPROL-XL) 25 MG 24 hr tablet    predniSONE (DELTASONE) 20 MG tablet    tamsulosin (FLOMAX) 0.4 mg Cap    traZODone (DESYREL) 50 MG tablet    zolpidem (AMBIEN) 10 mg Tab    calcium carbonate-vitamin D3 (CALCIUM 600 WITH VITAMIN D3) 600 mg-10 mcg (400 unit) Chew    gemfibroziL (LOPID) 600 MG tablet     No current facility-administered medications for this visit.       Review of patient's allergies indicates:   Allergen Reactions    Milk Other (See Comments)    Wheat containing prod Hives, Rash, Shortness Of Breath and Swelling    Egg white     Statins-hmg-coa reductase inhibitors Other (See Comments)    Adhesive Rash    Polyester fibers Rash       Past Medical History:   Diagnosis Date    Hyperlipidemia     Hypertension     NCGS (non-celiac gluten sensitivity)     Polycythemia     Statin intolerance     SVT (supraventricular tachycardia)      Past Surgical History:   Procedure Laterality Date    DENTAL SURGERY       dental implant    FINGER SURGERY       Family History       Problem Relation (Age of Onset)    Cancer Father, Brother    Hypertension Mother          Tobacco Use    Smoking status: Former     Packs/day: 1.00     Years: 5.00     Pack years: 5.00     Types: Cigarettes    Smokeless tobacco: Never    Tobacco comments:     Quit 17 yo   Substance and Sexual Activity    Alcohol use: Not Currently    Drug use: Not Currently    Sexual activity: Not Currently     Review of Systems   Constitutional:  Negative for activity change, appetite change, fatigue and fever.   HENT:  Negative for congestion.    Eyes:  Negative for photophobia, redness and visual disturbance.   Respiratory:  Negative for apnea, cough, chest tightness and shortness of breath.    Cardiovascular:  Negative for chest pain and leg swelling.   Gastrointestinal:  Negative for abdominal pain, nausea and vomiting.   Genitourinary:  Negative for difficulty urinating.   Musculoskeletal:  Negative for gait problem and myalgias.   Skin:  Negative for color change, rash and wound.   Neurological:  Negative for syncope, facial asymmetry, speech difficulty, weakness and numbness.   Objective:     Vital Signs (Most Recent):    Vital Signs (24h Range):  [unfilled]     Weight: 82.8 kg (182 lb 8.7 oz)  Body mass index is 26.19 kg/m².            [unfilled]    Physical Exam  Vitals and nursing note reviewed.   Constitutional:       Appearance: Normal appearance. He is normal weight.   HENT:      Head: Normocephalic and atraumatic.      Mouth/Throat:      Mouth: Mucous membranes are moist.   Eyes:      Extraocular Movements: Extraocular movements intact.      Conjunctiva/sclera: Conjunctivae normal.      Pupils: Pupils are equal, round, and reactive to light.   Neck:      Vascular: No carotid bruit.   Cardiovascular:      Rate and Rhythm: Normal rate and regular rhythm.      Pulses: Normal pulses.           Femoral pulses are 2+ on the right side and 2+ on the left side.        Dorsalis pedis pulses are 2+ on the right side and 2+ on the left side.   Pulmonary:      Effort: Pulmonary effort is normal.      Breath sounds: Normal breath sounds.   Abdominal:      General: Abdomen is flat. Bowel sounds are normal.      Palpations: Abdomen is soft.   Musculoskeletal:      Cervical back: Neck supple.      Right lower leg: No edema.      Left lower leg: No edema.   Skin:     General: Skin is warm.      Capillary Refill: Capillary refill takes 2 to 3 seconds.   Neurological:      General: No focal deficit present.      Mental Status: He is alert and oriented to person, place, and time. Mental status is at baseline.      Cranial Nerves: No cranial nerve deficit.      Sensory: No sensory deficit.      Motor: No weakness.   Psychiatric:         Mood and Affect: Mood normal.         Behavior: Behavior normal.       Significant Labs:  I have reviewed all pertinent lab results within the past 24 hours.    Significant Diagnostics:  I have reviewed all pertinent imaging results/findings within the past 24 hours.  CT: I have reviewed all pertinent results/findings within the past 24 hours and my personal findings are:  2.5 cm saccular infrarenal abdominal aortic aneurysm    Assessment/Plan:     There are no hospital problems to display for this patient.      Symptomatic infrarenal abdominal aortic saccular aneurysm  Will obtain CT angiogram abdomen pelvis to determine true size of this aneurysm and if there are any inflammatory changes necessitating more urgent surgical repair  Patient seen by allergist to determine if patient has truly allergic to polyester  or Dacron  Referred to cardiology for cardiac clearance    Thank you for your consult. I will follow-up with patient. Please contact us if you have any additional questions.    Samuel Sam IV, MD  Vascular Surgery  [facility] Vascular Surgery

## 2022-12-01 ENCOUNTER — OFFICE VISIT (OUTPATIENT)
Dept: FAMILY MEDICINE | Facility: CLINIC | Age: 57
End: 2022-12-01
Payer: COMMERCIAL

## 2022-12-01 ENCOUNTER — TELEPHONE (OUTPATIENT)
Dept: ALLERGY | Facility: CLINIC | Age: 57
End: 2022-12-01
Payer: COMMERCIAL

## 2022-12-01 ENCOUNTER — SPECIALTY PHARMACY (OUTPATIENT)
Dept: PHARMACY | Facility: CLINIC | Age: 57
End: 2022-12-01
Payer: COMMERCIAL

## 2022-12-01 VITALS
SYSTOLIC BLOOD PRESSURE: 126 MMHG | DIASTOLIC BLOOD PRESSURE: 84 MMHG | BODY MASS INDEX: 26.57 KG/M2 | TEMPERATURE: 98 F | HEART RATE: 84 BPM | RESPIRATION RATE: 16 BRPM | WEIGHT: 185.19 LBS

## 2022-12-01 DIAGNOSIS — I77.819 AORTIC ECTASIA: Primary | ICD-10-CM

## 2022-12-01 DIAGNOSIS — R10.31 RIGHT LOWER QUADRANT ABDOMINAL PAIN: ICD-10-CM

## 2022-12-01 DIAGNOSIS — N28.1 KIDNEY CYSTS: ICD-10-CM

## 2022-12-01 PROCEDURE — 3008F BODY MASS INDEX DOCD: CPT | Mod: CPTII,S$GLB,, | Performed by: INTERNAL MEDICINE

## 2022-12-01 PROCEDURE — 99213 OFFICE O/P EST LOW 20 MIN: CPT | Mod: S$GLB,,, | Performed by: INTERNAL MEDICINE

## 2022-12-01 PROCEDURE — 3008F PR BODY MASS INDEX (BMI) DOCUMENTED: ICD-10-PCS | Mod: CPTII,S$GLB,, | Performed by: INTERNAL MEDICINE

## 2022-12-01 PROCEDURE — 1159F PR MEDICATION LIST DOCUMENTED IN MEDICAL RECORD: ICD-10-PCS | Mod: CPTII,S$GLB,, | Performed by: INTERNAL MEDICINE

## 2022-12-01 PROCEDURE — 3079F PR MOST RECENT DIASTOLIC BLOOD PRESSURE 80-89 MM HG: ICD-10-PCS | Mod: CPTII,S$GLB,, | Performed by: INTERNAL MEDICINE

## 2022-12-01 PROCEDURE — 99999 PR PBB SHADOW E&M-EST. PATIENT-LVL IV: ICD-10-PCS | Mod: PBBFAC,,, | Performed by: INTERNAL MEDICINE

## 2022-12-01 PROCEDURE — 3074F PR MOST RECENT SYSTOLIC BLOOD PRESSURE < 130 MM HG: ICD-10-PCS | Mod: CPTII,S$GLB,, | Performed by: INTERNAL MEDICINE

## 2022-12-01 PROCEDURE — 99213 PR OFFICE/OUTPT VISIT, EST, LEVL III, 20-29 MIN: ICD-10-PCS | Mod: S$GLB,,, | Performed by: INTERNAL MEDICINE

## 2022-12-01 PROCEDURE — 1159F MED LIST DOCD IN RCRD: CPT | Mod: CPTII,S$GLB,, | Performed by: INTERNAL MEDICINE

## 2022-12-01 PROCEDURE — 3074F SYST BP LT 130 MM HG: CPT | Mod: CPTII,S$GLB,, | Performed by: INTERNAL MEDICINE

## 2022-12-01 PROCEDURE — 3079F DIAST BP 80-89 MM HG: CPT | Mod: CPTII,S$GLB,, | Performed by: INTERNAL MEDICINE

## 2022-12-01 PROCEDURE — 99999 PR PBB SHADOW E&M-EST. PATIENT-LVL IV: CPT | Mod: PBBFAC,,, | Performed by: INTERNAL MEDICINE

## 2022-12-01 RX ORDER — PANTOPRAZOLE SODIUM 40 MG/1
40 TABLET, DELAYED RELEASE ORAL
COMMUNITY
Start: 2022-11-15 | End: 2023-02-22 | Stop reason: SDUPTHER

## 2022-12-01 NOTE — TELEPHONE ENCOUNTER
Gillian, this is Park Moncada with [facility].  We are working on your prescription that your doctor has sent us. We will be working with your insurance to get this approved for you. We will be calling you along the way with updates on your medication.  If you have any questions, you can reach us at [phone].    Welcome call outcome: Patient/caregiver reached

## 2022-12-01 NOTE — TELEPHONE ENCOUNTER
----- Message from Jake Lopez sent at 12/1/2022 12:50 PM CST -----  Contact: self  Mr. Busby is asking for an return call in reference to scheduling an new pt apt sooner than what was offered , due to abdominal symptomatic aneurysm pt is needing to be tested to see if he is allergic to dacron, pt states he is allergic to polyester , please call back at .[phone] Thx CJ

## 2022-12-01 NOTE — TELEPHONE ENCOUNTER
I called the patient back I had already spoken to him just yesterday, as of right now there are no opening this month and he has been put on the waiting list patient still wants to be squeezed in any way that we should under stand. I informed him I would speak to  and see what could be done.          ----- Message from Jake Lopez sent at 12/1/2022 12:50 PM CST -----  Contact: self  Mr. Busby is asking for an return call in reference to scheduling an new pt apt sooner than what was offered , due to abdominal symptomatic aneurysm pt is needing to be tested to see if he is allergic to dacron, pt states he is allergic to polyester , please call back at .[phone] Thx CJ

## 2022-12-01 NOTE — PROGRESS NOTES
Subjective:       Patient ID: Harish Busby is a 57 y.o. male.    Chief Complaint: Follow-up (1m) and Abdominal Pain    F/u right side abdominal pain---persists-----------    Follow-up  Associated symptoms include abdominal pain. Pertinent negatives include no arthralgias, chest pain, chills, coughing, diaphoresis, fatigue, fever, headaches, joint swelling, myalgias, nausea, neck pain, numbness, rash, sore throat, vomiting or weakness.   Abdominal Pain  Pertinent negatives include no arthralgias, constipation, diarrhea, dysuria, fever, frequency, headaches, hematuria, myalgias, nausea or vomiting.   Past Medical History:   Diagnosis Date    Hyperlipidemia     Hypertension     NCGS (non-celiac gluten sensitivity)     Polycythemia     Preop cardiovascular exam 11/30/2022    Statin intolerance     Supraventricular tachycardia     SVT (supraventricular tachycardia)      Past Surgical History:   Procedure Laterality Date    DENTAL SURGERY      dental implant    FINGER SURGERY       Family History   Problem Relation Age of Onset    Hypertension Mother     Cancer Father         cancer - leukemia    Cancer Brother         lung     Social History     Socioeconomic History    Marital status:    Occupational History    Occupation: [employer]   Tobacco Use    Smoking status: Former     Packs/day: 1.00     Years: 5.00     Pack years: 5.00     Types: Cigarettes    Smokeless tobacco: Never    Tobacco comments:     Quit 19 yo   Substance and Sexual Activity    Alcohol use: Not Currently    Drug use: Not Currently    Sexual activity: Not Currently     Review of Systems   Constitutional:  Negative for activity change, appetite change, chills, diaphoresis, fatigue, fever and unexpected weight change.   HENT:  Negative for drooling, ear discharge, ear pain, facial swelling, hearing loss, mouth sores, nosebleeds, postnasal drip, rhinorrhea, sinus pressure, sneezing, sore throat, tinnitus, trouble swallowing and voice  change.    Eyes:  Negative for photophobia, redness and visual disturbance.   Respiratory:  Negative for apnea, cough, choking, chest tightness, shortness of breath and wheezing.    Cardiovascular:  Negative for chest pain, palpitations and leg swelling.   Gastrointestinal:  Positive for abdominal pain. Negative for abdominal distention, anal bleeding, blood in stool, constipation, diarrhea, nausea and vomiting.   Endocrine: Negative for cold intolerance, heat intolerance, polydipsia, polyphagia and polyuria.   Genitourinary:  Negative for difficulty urinating, dysuria, enuresis, flank pain, frequency, genital sores, hematuria and urgency.   Musculoskeletal:  Negative for arthralgias, back pain, gait problem, joint swelling, myalgias, neck pain and neck stiffness.   Skin:  Negative for color change, pallor, rash and wound.   Allergic/Immunologic: Negative for food allergies and immunocompromised state.   Neurological:  Negative for dizziness, tremors, seizures, syncope, facial asymmetry, speech difficulty, weakness, light-headedness, numbness and headaches.   Hematological:  Negative for adenopathy. Does not bruise/bleed easily.   Psychiatric/Behavioral:  Negative for agitation, behavioral problems, confusion, decreased concentration, dysphoric mood, hallucinations, self-injury, sleep disturbance and suicidal ideas. The patient is not nervous/anxious and is not hyperactive.      Objective:      Physical Exam  Vitals and nursing note reviewed.   Constitutional:       General: He is not in acute distress.     Appearance: Normal appearance. He is well-developed. He is not diaphoretic.   HENT:      Head: Normocephalic and atraumatic.      Mouth/Throat:      Pharynx: No oropharyngeal exudate.   Eyes:      General: No scleral icterus.     Pupils: Pupils are equal, round, and reactive to light.   Neck:      Thyroid: No thyromegaly.      Vascular: No carotid bruit or JVD.      Trachea: No tracheal deviation.    Cardiovascular:      Rate and Rhythm: Normal rate and regular rhythm.      Heart sounds: Normal heart sounds.   Pulmonary:      Effort: Pulmonary effort is normal. No respiratory distress.      Breath sounds: Normal breath sounds. No wheezing or rales.   Chest:      Chest wall: No tenderness.   Abdominal:      General: Bowel sounds are normal. There is no distension.      Palpations: Abdomen is soft.      Tenderness: There is no abdominal tenderness. There is no guarding or rebound.   Musculoskeletal:         General: No tenderness. Normal range of motion.      Cervical back: Normal range of motion and neck supple.   Lymphadenopathy:      Cervical: No cervical adenopathy.   Skin:     General: Skin is warm and dry.      Coloration: Skin is not pale.      Findings: No erythema or rash.   Neurological:      Mental Status: He is alert and oriented to person, place, and time.   Psychiatric:         Behavior: Behavior normal.         Thought Content: Thought content normal.         Judgment: Judgment normal.       CMP  Sodium   Date Value Ref Range Status   10/19/2022 137 136 - 145 mmol/L Final     Potassium   Date Value Ref Range Status   10/19/2022 4.2 3.5 - 5.1 mmol/L Final     Chloride   Date Value Ref Range Status   10/19/2022 103 95 - 110 mmol/L Final     CO2   Date Value Ref Range Status   10/19/2022 27 23 - 29 mmol/L Final     Glucose   Date Value Ref Range Status   10/19/2022 77 70 - 110 mg/dL Final     BUN   Date Value Ref Range Status   10/19/2022 16 6 - 20 mg/dL Final     Creatinine   Date Value Ref Range Status   11/30/2022 1.2 0.5 - 1.4 mg/dL Final     Calcium   Date Value Ref Range Status   10/19/2022 9.0 8.7 - 10.5 mg/dL Final     Total Protein   Date Value Ref Range Status   10/19/2022 6.2 6.0 - 8.4 g/dL Final     Albumin   Date Value Ref Range Status   10/19/2022 3.7 3.5 - 5.2 g/dL Final   03/21/2022 4.3 3.6 - 5.1 Final     Total Bilirubin   Date Value Ref Range Status   10/19/2022 0.6 0.1 - 1.0 mg/dL  Final     Comment:     For infants and newborns, interpretation of results should be based  on gestational age, weight and in agreement with clinical  observations.    Premature Infant recommended reference ranges:  Up to 24 hours.............<8.0 mg/dL  Up to 48 hours............<12.0 mg/dL  3-5 days..................<15.0 mg/dL  6-29 days.................<15.0 mg/dL       Alkaline Phosphatase   Date Value Ref Range Status   10/19/2022 61 55 - 135 U/L Final     AST   Date Value Ref Range Status   10/19/2022 17 10 - 40 U/L Final     ALT   Date Value Ref Range Status   10/19/2022 31 10 - 44 U/L Final     Anion Gap   Date Value Ref Range Status   10/19/2022 7 (L) 8 - 16 mmol/L Final     eGFR if    Date Value Ref Range Status   03/21/2022 81 >OR=60 mL/min/1.73 m2 Final     eGFR if non    Date Value Ref Range Status   03/21/2022 70.0 >OR=60 mL/min/1.73 m2 Final     Lab Results   Component Value Date    WBC 5.83 10/19/2022    HGB 14.0 10/19/2022    HCT 46.8 10/19/2022    MCV 85 10/19/2022     10/19/2022     Lab Results   Component Value Date    CHOL 216 (H) 03/21/2022     Lab Results   Component Value Date    HDL 50 03/21/2022     Lab Results   Component Value Date    LDLCALC 141 (H) 03/21/2022     Lab Results   Component Value Date    TRIG 126 03/21/2022     Lab Results   Component Value Date    CHOLHDL 4.3 03/21/2022     No results found for: TSH  No results found for: LABA1C, HGBA1C  Assessment:       1. Aortic ectasia    2. Kidney cysts    3. Right lower quadrant abdominal pain          Plan:   Aortic ectasia-------------f/u vascular surgery----------    Kidney cysts--------------f/u urology------------    Right lower quadrant abdominal pain------------------------seen by gi--------------------has colon-rectal surgery appt------------      F/u 1 month-

## 2022-12-02 ENCOUNTER — PATIENT MESSAGE (OUTPATIENT)
Dept: FAMILY MEDICINE | Facility: CLINIC | Age: 57
End: 2022-12-02
Payer: COMMERCIAL

## 2022-12-02 RX ORDER — HYOSCYAMINE SULFATE 0.125 MG
125 TABLET ORAL 2 TIMES DAILY PRN
Qty: 60 TABLET | Refills: 0 | Status: SHIPPED | OUTPATIENT
Start: 2022-12-02 | End: 2023-02-22

## 2022-12-02 NOTE — TELEPHONE ENCOUNTER
Spoke with pt he stated he needs pain medication for at night.  He stated that you know of the issues why he is needing it.

## 2022-12-06 ENCOUNTER — OFFICE VISIT (OUTPATIENT)
Dept: ALLERGY | Facility: CLINIC | Age: 57
End: 2022-12-06
Payer: COMMERCIAL

## 2022-12-06 ENCOUNTER — SPECIALTY PHARMACY (OUTPATIENT)
Dept: PHARMACY | Facility: CLINIC | Age: 57
End: 2022-12-06
Payer: COMMERCIAL

## 2022-12-06 VITALS
HEART RATE: 79 BPM | SYSTOLIC BLOOD PRESSURE: 137 MMHG | HEIGHT: 70 IN | BODY MASS INDEX: 26.33 KG/M2 | TEMPERATURE: 98 F | WEIGHT: 183.88 LBS | DIASTOLIC BLOOD PRESSURE: 89 MMHG

## 2022-12-06 DIAGNOSIS — E78.5 DYSLIPIDEMIA: Primary | ICD-10-CM

## 2022-12-06 DIAGNOSIS — L25.9 CONTACT DERMATITIS, UNSPECIFIED CONTACT DERMATITIS TYPE, UNSPECIFIED TRIGGER: Primary | ICD-10-CM

## 2022-12-06 DIAGNOSIS — Z91.018 FOOD ALLERGY: ICD-10-CM

## 2022-12-06 DIAGNOSIS — I71.43 INFRARENAL ABDOMINAL AORTIC ANEURYSM (AAA) WITHOUT RUPTURE: ICD-10-CM

## 2022-12-06 PROCEDURE — 99204 OFFICE O/P NEW MOD 45 MIN: CPT | Mod: S$GLB,,, | Performed by: ALLERGY & IMMUNOLOGY

## 2022-12-06 PROCEDURE — 3079F DIAST BP 80-89 MM HG: CPT | Mod: CPTII,S$GLB,, | Performed by: ALLERGY & IMMUNOLOGY

## 2022-12-06 PROCEDURE — 3075F PR MOST RECENT SYSTOLIC BLOOD PRESS GE 130-139MM HG: ICD-10-PCS | Mod: CPTII,S$GLB,, | Performed by: ALLERGY & IMMUNOLOGY

## 2022-12-06 PROCEDURE — 99999 PR PBB SHADOW E&M-EST. PATIENT-LVL IV: ICD-10-PCS | Mod: PBBFAC,,, | Performed by: ALLERGY & IMMUNOLOGY

## 2022-12-06 PROCEDURE — 99204 PR OFFICE/OUTPT VISIT, NEW, LEVL IV, 45-59 MIN: ICD-10-PCS | Mod: S$GLB,,, | Performed by: ALLERGY & IMMUNOLOGY

## 2022-12-06 PROCEDURE — 99999 PR PBB SHADOW E&M-EST. PATIENT-LVL IV: CPT | Mod: PBBFAC,,, | Performed by: ALLERGY & IMMUNOLOGY

## 2022-12-06 PROCEDURE — 3079F PR MOST RECENT DIASTOLIC BLOOD PRESSURE 80-89 MM HG: ICD-10-PCS | Mod: CPTII,S$GLB,, | Performed by: ALLERGY & IMMUNOLOGY

## 2022-12-06 PROCEDURE — 3008F PR BODY MASS INDEX (BMI) DOCUMENTED: ICD-10-PCS | Mod: CPTII,S$GLB,, | Performed by: ALLERGY & IMMUNOLOGY

## 2022-12-06 PROCEDURE — 3008F BODY MASS INDEX DOCD: CPT | Mod: CPTII,S$GLB,, | Performed by: ALLERGY & IMMUNOLOGY

## 2022-12-06 PROCEDURE — 3075F SYST BP GE 130 - 139MM HG: CPT | Mod: CPTII,S$GLB,, | Performed by: ALLERGY & IMMUNOLOGY

## 2022-12-06 RX ORDER — EPINEPHRINE 0.3 MG/.3ML
1 INJECTION SUBCUTANEOUS ONCE
Qty: 2 EACH | Refills: 1 | Status: SHIPPED | OUTPATIENT
Start: 2022-12-06 | End: 2022-12-20 | Stop reason: SDUPTHER

## 2022-12-06 RX ORDER — ASPIRIN 81 MG/1
1 TABLET ORAL EVERY MORNING
COMMUNITY
End: 2023-02-22

## 2022-12-06 RX ORDER — PREDNISONE 20 MG/1
TABLET ORAL
COMMUNITY
Start: 2022-11-20 | End: 2023-02-22 | Stop reason: SDUPTHER

## 2022-12-06 NOTE — TELEPHONE ENCOUNTER
Specialty Pharmacy - Initial Clinical Assessment    Specialty Medication Orders Linked to Encounter      Flowsheet Row Most Recent Value   Medication #1 evolocumab (REPATHA SURECLICK) 140 mg/mL PnIj (Order#[id number], Rx#[id number])          Patient Diagnosis   E78.5 - Dyslipidemia    Subjective    Harish Busby is a 57 y.o. male, who is followed by the specialty pharmacy service for management and education.    Recent Encounters       Date Type Provider Description    12/06/2022 [facility]acy [facility] PharmD Initial Clinical Assessment    12/01/2022 [facility] PharmD Referral Authorization          Clinical call attempts since last clinical assessment   No call attempts found.     Current Outpatient Medications   Medication Sig    anastrozole (ARIMIDEX) 1 mg Tab Take 1 tablet (1 mg total) by mouth once daily.    evolocumab (REPATHA SURECLICK) 140 mg/mL PnIj Inject 1 mL (140 mg total) into the skin every 14 (fourteen) days.    ezetimibe (ZETIA) 10 mg tablet Take 1 tablet (10 mg total) by mouth once daily.    hyoscyamine (ANASPAZ,LEVSIN) 0.125 mg Tab Take 1 tablet (125 mcg total) by mouth 2 (two) times daily as needed.    metoprolol succinate (TOPROL-XL) 25 MG 24 hr tablet Take 2 tablets (50 mg total) by mouth once daily.    pantoprazole (PROTONIX) 40 MG tablet Take 40 mg by mouth.    tamsulosin (FLOMAX) 0.4 mg Cap Take 1 capsule (0.4 mg total) by mouth once daily.    traZODone (DESYREL) 50 MG tablet 1 tablet nightly as needed.    zolpidem (AMBIEN) 10 mg Tab Take 1 tablet (10 mg total) by mouth nightly as needed (insomnia).   Last reviewed on 12/1/2022 10:24 AM by Ekta Posada MA    Review of patient's allergies indicates:   Allergen Reactions    Milk Other (See Comments)    Wheat containing prod Hives, Rash, Shortness Of Breath and Swelling    Egg white     Statins-hmg-coa reductase inhibitors Other (See Comments)    Adhesive Rash    Polyester fibers Rash   Last reviewed on   12/2/2022 11:49 AM by Alexander Jones    Drug Interactions    Drug interactions evaluated: no  Clinically relevant drug interactions identified: no  Provided the patient with educational material regarding drug interactions: not applicable         Adverse Effects    *All other systems reviewed and are negative       Assessment Questions - Documented Responses      Flowsheet Row Most Recent Value   Assessment    Medication Reconciliation completed for patient No   During the past 4 weeks, has patient missed any activities due to condition or medication? No   During the past 4 weeks, did patient have any of the following urgent care visits? None   Goals of Therapy Status Discussed (new start)   Status of the patients ability to self-administer: Is Able   All education points have been covered with patient? No, patient declined- printed education provided   Welcome packet contents reviewed and discussed with patient? Yes   Assesment completed? Yes   Plan Therapy being initiated   Do you need to open a clinical intervention (i-vent)? No   Do you want to schedule first shipment? Yes   Medication #1 Assessment Info    Patient status New medication, New to [facility]   Is this medication appropriate for the patient? Yes   Is this medication effective? Not yet started          Refill Questions - Documented Responses      Flowsheet Row Most Recent Value   Patient Availability and HIPAA Verification    Does patient want to proceed with activity? Yes   HIPAA/medical authority confirmed? Yes   Relationship to patient of person spoken to? Self   Refill Screening Questions    When does the patient need to receive the medication? 12/08/22   Refill Delivery Questions    How will the patient receive the medication? [facility]   When does the patient need to receive the medication? 12/08/22   Shipping Address Home   Address in [facility] confirmed and updated if neccessary? Yes   Expected Copay ($) 30   Is the patient able to afford  "the medication copay? Yes   Payment Method CC on file   Days supply of Refill 28   Supplies needed? No supplies needed   Refill activity completed? Yes   Refill activity plan Refill scheduled   Shipment/Pickup Date: 12/07/22            Objective    He has a past medical history of Hyperlipidemia, Hypertension, NCGS (non-celiac gluten sensitivity), Polycythemia, Preop cardiovascular exam (11/30/2022), Statin intolerance, Supraventricular tachycardia, and SVT (supraventricular tachycardia).    Tried/failed medications:     BP Readings from Last 4 Encounters:   12/01/22 126/84   11/30/22 120/82   11/15/22 122/79   11/04/22 (!) 134/100     Ht Readings from Last 4 Encounters:   11/30/22 5' 10" (1.778 m)   11/15/22 5' 10" (1.778 m)   11/04/22 5' 10" (1.778 m)   10/26/22 5' 10" (1.778 m)     Wt Readings from Last 4 Encounters:   12/01/22 84 kg (185 lb 3 oz)   11/30/22 82.6 kg (182 lb)   11/30/22 82.8 kg (182 lb 8.7 oz)   11/15/22 79.4 kg (175 lb)       The goals of prescribed drug therapy management include:  Supporting patient to meet the prescriber's medical treatment objectives  Improving or maintaining quality of life  Maintaining optimal therapy adherence  Minimizing and managing side effects      Goals of Therapy Status: Discussed (new start)    Assessment/Plan  Patient plans to start therapy on 12/08/22      Indication, dosage, appropriateness, effectiveness, safety and convenience of his specialty medication(s) were reviewed today.     Patient Education   Pharmacist offer to  patient was declined. Printed educational materials will be provided with medication.      Tasks added this encounter   12/29/2022 - Refill Call (Auto Added)   Tasks due within next 3 months   No tasks due.     Park Moncada, PharmD  [facility]  [facility] [facility] [address]  Phone: [phone]  Fax: [phone]  "

## 2022-12-06 NOTE — PROGRESS NOTES
"Subjective:       Patient ID: Harish Busby is a 57 y.o. male.    Referred by Samuel Sam IV, MD    Chief Complaint:  Allergies      HPI: 57 year old male complaining of an allergy to polyester. He is to have a surgery and have dacron placed. He is on prednisone for allergies.    "Ige blood test"  Avoiding dairy and wheat  "Prednisone helps."  Prednisone 5mg most days  Anaphylaxis once- unsure of etiology- thanksgiving 4 years ago  No Epipen     Triple A surgery with Dr. Sam.        Past Medical History:   Diagnosis Date    Hyperlipidemia     Hypertension     NCGS (non-celiac gluten sensitivity)     Polycythemia     Preop cardiovascular exam 11/30/2022    Statin intolerance     Supraventricular tachycardia     SVT (supraventricular tachycardia)         Family History   Problem Relation Age of Onset    Hypertension Mother     Cancer Father         cancer - leukemia    Cancer Brother         lung        Current Outpatient Medications on File Prior to Visit   Medication Sig Dispense Refill    anastrozole (ARIMIDEX) 1 mg Tab Take 1 tablet (1 mg total) by mouth once daily. 90 tablet 3    aspirin (ECOTRIN) 81 MG EC tablet Take 1 tablet by mouth every morning.      evolocumab (REPATHA SURECLICK) 140 mg/mL PnIj Inject 1 mL (140 mg total) into the skin every 14 (fourteen) days. 6 mL 4    ezetimibe (ZETIA) 10 mg tablet Take 1 tablet (10 mg total) by mouth once daily. 90 tablet 3    hyoscyamine (ANASPAZ,LEVSIN) 0.125 mg Tab Take 1 tablet (125 mcg total) by mouth 2 (two) times daily as needed. 60 tablet 0    metoprolol succinate (TOPROL-XL) 25 MG 24 hr tablet Take 2 tablets (50 mg total) by mouth once daily. 180 tablet 3    pantoprazole (PROTONIX) 40 MG tablet Take 40 mg by mouth.      predniSONE (DELTASONE) 20 MG tablet       tamsulosin (FLOMAX) 0.4 mg Cap Take 1 capsule (0.4 mg total) by mouth once daily. 90 capsule 3    traZODone (DESYREL) 50 MG tablet 1 tablet nightly as needed.      zolpidem (AMBIEN) 10 mg Tab Take " 1 tablet (10 mg total) by mouth nightly as needed (insomnia). 30 tablet 2     No current facility-administered medications on file prior to visit.        Review of patient's allergies indicates:   Allergen Reactions    Milk Other (See Comments)    Wheat containing prod Hives, Rash, Shortness Of Breath and Swelling    Egg white     Statins-hmg-coa reductase inhibitors Other (See Comments)    Wheat     Adhesive Rash    Polyester fibers Rash        Environmental History: non-contributory  Review of Systems   HENT:  Negative for congestion and rhinorrhea.    Eyes:  Negative for discharge and itching.   Respiratory:  Negative for shortness of breath.    Allergic/Immunologic: Negative for environmental allergies and food allergies.   Neurological:  Negative for facial asymmetry and speech difficulty.   Psychiatric/Behavioral:  Negative for behavioral problems and suicidal ideas.    All other systems reviewed and are negative.     Objective:    Physical Exam  Vitals reviewed.   Constitutional:       General: He is not in acute distress.     Appearance: Normal appearance. He is not ill-appearing, toxic-appearing or diaphoretic.   HENT:      Head: Normocephalic and atraumatic.      Right Ear: External ear normal. There is no impacted cerumen.      Left Ear: Tympanic membrane and external ear normal.      Nose: Nose normal. No congestion or rhinorrhea.      Mouth/Throat:      Mouth: Mucous membranes are moist.      Pharynx: No oropharyngeal exudate or posterior oropharyngeal erythema.   Eyes:      General: No scleral icterus.        Right eye: No discharge.         Left eye: No discharge.      Pupils: Pupils are equal, round, and reactive to light.   Cardiovascular:      Rate and Rhythm: Normal rate and regular rhythm.      Heart sounds: Normal heart sounds. No murmur heard.    No friction rub. No gallop.   Pulmonary:      Effort: Pulmonary effort is normal. No respiratory distress.      Breath sounds: Normal breath sounds.  No stridor. No wheezing, rhonchi or rales.   Chest:      Chest wall: No tenderness.   Abdominal:      General: There is no distension.      Palpations: There is no mass.      Tenderness: There is no abdominal tenderness. There is no guarding or rebound.      Hernia: No hernia is present.   Musculoskeletal:         General: No swelling, tenderness, deformity or signs of injury. Normal range of motion.      Right lower leg: No edema.      Left lower leg: No edema.   Skin:     General: Skin is warm.      Coloration: Skin is not jaundiced.      Findings: No lesion.   Neurological:      General: No focal deficit present.      Mental Status: He is alert and oriented to person, place, and time.      Gait: Gait normal.   Psychiatric:         Mood and Affect: Mood normal.         Behavior: Behavior normal.         Thought Content: Thought content normal.         Judgment: Judgment normal.        Assessment:       1. Contact dermatitis, unspecified contact dermatitis type, unspecified trigger    2. Food allergy         Plan:       No prednisone for 2 weeks prior to patch testing  Will attempt to require material that will be used for AAA surgery and place on his back for patch testing.  Recommend avoidance of foods that have caused symptoms.  Epipen 2 pack- use, care and administration    Contact dermatitis, unspecified contact dermatitis type, unspecified trigger    Food allergy  -     EPINEPHrine (EPIPEN) 0.3 mg/0.3 mL AtIn; Inject 0.3 mLs (0.3 mg total) into the muscle once. for 1 dose  Dispense: 2 each; Refill: 1    Infrarenal abdominal aortic aneurysm (AAA) without rupture  -     Ambulatory referral/consult to Allergy      RTC 2-4 weeks for patch testing     INDY ROSENTHAL                Problems Address                                                 Amount and/or Complexity                                                                      Risk       3           [] 2 or more self-limited or minor problems                       [] Limited                                                                        [] Low                  [] 1 stable chronic illness                                                  Any combination of the two                                               OTC drugs                  []Acute, uncomplicated illness or injury                            Review of prior external notes from unique source           Minor surgery with no risk factors                                                                                                               [] 1 []2  []3+                                                                                                              Review of results from each unique test                                                                                                               [] 1 []2  [] 3+                                                                                                              Order of each unique test                                                                                                               [] 1 []2  [] 3+                                                                                                              Or                                                                                                             [] Assessment requiring an independent historian      4            [] One or more chronic illness with exacerbation,              [] Moderate                                                                      [x] Moderate                 Progression, or side effects of treatment                            -test documents or independent historians                        Prescription drug management                [x]  2 or more stable chronic illnesses                                    [] Independent interpretation of tests                              Minor surgery with identifiable risk                [] 1  undiagnosed new problem with uncertain prognosis    [] Discussion or management of test results                    elective major surgery                [] 1 acute illness with                systemic symptoms                                                                                                                                                              [] 1 acute complicated injury                                                                                                                                          Elective major surgery                                                                                                                                                                                                                                                                                                                                                                                                  5            [] 1 or more chronic illnesses with severe exacerbation,     [] Extensive(two from below)                                         [] High                                                                                                               [] Independent interpretation of results                         Drug therapy requiring intensive                                                                                                               []Discussion of management or test interpretation           monitoring                                                                                                                                                                                                       Decision to de-escalate care                 [] 1 acute or chronic illness or injury that poses a threat                                                                                               Decision regarding hospitalization                                                                                                                                                                                                                CC: Dr. Cecy ORANTES

## 2022-12-06 NOTE — TELEPHONE ENCOUNTER
Repatha PA approved with [facility] from 12/1/22 - 12/1/23. Case: [id number]. Gave permission to secure copay card for 2nd fill.     BI: [facility]  Copay: $30  Deductible: $0  OOP: $8550 ($6183.8 remaining)  In network    No FA required for 1st fill.    Repatha copay card  BIN: [id number]  PCN: [id number]  GRP: [id number]  ID: [id number]

## 2022-12-07 ENCOUNTER — TELEPHONE (OUTPATIENT)
Dept: RADIOLOGY | Facility: HOSPITAL | Age: 57
End: 2022-12-07
Payer: COMMERCIAL

## 2022-12-07 ENCOUNTER — OFFICE VISIT (OUTPATIENT)
Dept: UROLOGY | Facility: CLINIC | Age: 57
End: 2022-12-07
Payer: COMMERCIAL

## 2022-12-07 VITALS
BODY MASS INDEX: 26.38 KG/M2 | HEART RATE: 76 BPM | SYSTOLIC BLOOD PRESSURE: 141 MMHG | RESPIRATION RATE: 18 BRPM | WEIGHT: 183.88 LBS | DIASTOLIC BLOOD PRESSURE: 89 MMHG

## 2022-12-07 DIAGNOSIS — K59.00 CONSTIPATION, UNSPECIFIED CONSTIPATION TYPE: ICD-10-CM

## 2022-12-07 DIAGNOSIS — N28.1 KIDNEY CYSTS: ICD-10-CM

## 2022-12-07 DIAGNOSIS — N20.0 KIDNEY STONES: ICD-10-CM

## 2022-12-07 DIAGNOSIS — N40.0 BENIGN PROSTATIC HYPERPLASIA WITHOUT LOWER URINARY TRACT SYMPTOMS: Primary | ICD-10-CM

## 2022-12-07 PROCEDURE — 99999 PR PBB SHADOW E&M-EST. PATIENT-LVL IV: ICD-10-PCS | Mod: PBBFAC,,, | Performed by: UROLOGY

## 2022-12-07 PROCEDURE — 99214 PR OFFICE/OUTPT VISIT, EST, LEVL IV, 30-39 MIN: ICD-10-PCS | Mod: S$GLB,,, | Performed by: UROLOGY

## 2022-12-07 PROCEDURE — 3008F BODY MASS INDEX DOCD: CPT | Mod: CPTII,S$GLB,, | Performed by: UROLOGY

## 2022-12-07 PROCEDURE — 3077F SYST BP >= 140 MM HG: CPT | Mod: CPTII,S$GLB,, | Performed by: UROLOGY

## 2022-12-07 PROCEDURE — 1159F MED LIST DOCD IN RCRD: CPT | Mod: CPTII,S$GLB,, | Performed by: UROLOGY

## 2022-12-07 PROCEDURE — 1159F PR MEDICATION LIST DOCUMENTED IN MEDICAL RECORD: ICD-10-PCS | Mod: CPTII,S$GLB,, | Performed by: UROLOGY

## 2022-12-07 PROCEDURE — 99214 OFFICE O/P EST MOD 30 MIN: CPT | Mod: S$GLB,,, | Performed by: UROLOGY

## 2022-12-07 PROCEDURE — 3079F DIAST BP 80-89 MM HG: CPT | Mod: CPTII,S$GLB,, | Performed by: UROLOGY

## 2022-12-07 PROCEDURE — 99999 PR PBB SHADOW E&M-EST. PATIENT-LVL IV: CPT | Mod: PBBFAC,,, | Performed by: UROLOGY

## 2022-12-07 PROCEDURE — 3008F PR BODY MASS INDEX (BMI) DOCUMENTED: ICD-10-PCS | Mod: CPTII,S$GLB,, | Performed by: UROLOGY

## 2022-12-07 PROCEDURE — 3077F PR MOST RECENT SYSTOLIC BLOOD PRESSURE >= 140 MM HG: ICD-10-PCS | Mod: CPTII,S$GLB,, | Performed by: UROLOGY

## 2022-12-07 PROCEDURE — 3079F PR MOST RECENT DIASTOLIC BLOOD PRESSURE 80-89 MM HG: ICD-10-PCS | Mod: CPTII,S$GLB,, | Performed by: UROLOGY

## 2022-12-07 NOTE — PROGRESS NOTES
Chief Complaint:   Encounter Diagnoses   Name Primary?    Benign prostatic hyperplasia without lower urinary tract symptoms Yes    Kidney cysts     Kidney stones     Constipation, unspecified constipation type        HPI:    12/7/22- patient states that he noted improvement after aspiration of the cyst, but it immediately recurred.  Symptoms have returned, he is being worked up for possible AAA repair.  Patient states tamsulosin has assisted his flow, pleased with the results.  Constipation is being managed by GI, no stone pain.  57-year-old gentleman who comes in with significant pain over the last 8 weeks along the right upper quadrant.  States that he initially felt some numbness to this side but now appears to be getting pain, not intermittent but constant.  His history of cysts on the left now 1 on the right and he is concerned that this cyst is causing compression of the liver.  Patient states the pain is worse during the day, with no real relief.  He has known constipation which she eyes attempting to control.  Recent CT scan which was done after GoLYTELY bowel prep.  Still having significant dilatation redundancy of the right colon and hepatic flexure.  He has never had anything such as this before.  No gross hematuria, does have a history of smoking, no other urological history.  He gets up 2 times per evening to void, he does have increased frequency with decreased flow with split stream during the daytime.  He quit some of this to his hydration status, no evidence of urgency.  Patient states that he has had chronic constipation his whole life.  Reported colonoscopy few years ago with polyps.    Allergies:  Milk, Wheat containing prod, Egg white, Statins-hmg-coa reductase inhibitors, Wheat, Adhesive, and Polyester fibers    Medications:  has a current medication list which includes the following prescription(s): anastrozole, aspirin, epinephrine, repatha sureclick, ezetimibe, hyoscyamine, metoprolol  succinate, pantoprazole, prednisone, tamsulosin, trazodone, and zolpidem.    Review of Systems:  General: No fever, chills, fatigability, or weight loss.  Skin: No rashes, itching, or changes in color or texture of skin.  Chest: Denies CARBAJAL, cyanosis, wheezing, cough, and sputum production.  Abdomen: Appetite fine. No weight loss. Denies diarrhea, abdominal pain, hematemesis, or blood in stool.  Musculoskeletal: No joint stiffness or swelling. Denies back pain.  : As above.  All other review of systems negative.    PMH:   has a past medical history of Hyperlipidemia, Hypertension, NCGS (non-celiac gluten sensitivity), Polycythemia, Preop cardiovascular exam (11/30/2022), Statin intolerance, Supraventricular tachycardia, and SVT (supraventricular tachycardia).    PSH:   has a past surgical history that includes Finger surgery and Dental surgery.    FamHx: family history includes Cancer in his brother and father; Hypertension in his mother.    SocHx:  reports that he has quit smoking. His smoking use included cigarettes. He has a 5.00 pack-year smoking history. He has never used smokeless tobacco. He reports that he does not currently use alcohol. He reports that he does not currently use drugs.      Physical Exam:  There were no vitals filed for this visit.    General: A&Ox3, no apparent distress, no deformities  Neck: No masses, normal ROM  Lungs: normal inspiration, no use of accessory muscles  Heart: normal pulse, no arrhythmias  Abdomen: Soft, tender right upper quadrant, slight costovertebral angle tenderness on the right, ND, no masses,   Skin: The skin is warm and dry. No jaundice.  Ext: No c/c/e.    Labs/Studies:   CT with contrast 4.7 cm right renal cyst, 1.3 cm left renal cyst 10/22   KUB possible small punctate stones on the right 10/22   Creatinine 1.2 11/22   PSA 1.02 10/22    Impression/Plan:      1. Right renal cyst/right upper quadrant pain-  right cyst aspiration  11/15/22    While constipation is  being controlled by GI, patient did note improvement after cyst aspiration.  Will arrange for IR to see him back for cyst ablation with drainage.  See me back pending their procedure.  Currently in the process of being worked up for a AAA repair by vascular surgery.      2. BPH- tamsulosin has worked well and will continue, pleased with the results.       3. Nephrolithiasis- small and nonobstructing, will monitor.

## 2022-12-07 NOTE — TELEPHONE ENCOUNTER
Interventional Radiology:    Spoke with pt and got him scheduled for 12/28 @ 9:30am. Informed pt to be NPO after midnight, show up to [facility] on [address] at 8am, either have a ride with them or have a phone number for the person driving them home so that we can get in contact with them to keep them updated. Pt denies the use of blood thinners, aspirin, or fish oil. Answered all questions that the pt had and pt verbalized understanding of all discussed.

## 2022-12-12 ENCOUNTER — OFFICE VISIT (OUTPATIENT)
Dept: SURGERY | Facility: CLINIC | Age: 57
End: 2022-12-12
Payer: COMMERCIAL

## 2022-12-12 VITALS
BODY MASS INDEX: 26.35 KG/M2 | HEART RATE: 78 BPM | DIASTOLIC BLOOD PRESSURE: 95 MMHG | SYSTOLIC BLOOD PRESSURE: 142 MMHG | TEMPERATURE: 98 F | WEIGHT: 183.63 LBS

## 2022-12-12 DIAGNOSIS — Z86.010 PERSONAL HISTORY OF COLONIC POLYPS: ICD-10-CM

## 2022-12-12 DIAGNOSIS — K59.00 CONSTIPATION, UNSPECIFIED CONSTIPATION TYPE: Primary | ICD-10-CM

## 2022-12-12 PROCEDURE — 1159F MED LIST DOCD IN RCRD: CPT | Mod: CPTII,S$GLB,, | Performed by: COLON & RECTAL SURGERY

## 2022-12-12 PROCEDURE — 99203 OFFICE O/P NEW LOW 30 MIN: CPT | Mod: S$GLB,,, | Performed by: COLON & RECTAL SURGERY

## 2022-12-12 PROCEDURE — 3008F BODY MASS INDEX DOCD: CPT | Mod: CPTII,S$GLB,, | Performed by: COLON & RECTAL SURGERY

## 2022-12-12 PROCEDURE — 99999 PR PBB SHADOW E&M-EST. PATIENT-LVL III: ICD-10-PCS | Mod: PBBFAC,,, | Performed by: COLON & RECTAL SURGERY

## 2022-12-12 PROCEDURE — 3077F SYST BP >= 140 MM HG: CPT | Mod: CPTII,S$GLB,, | Performed by: COLON & RECTAL SURGERY

## 2022-12-12 PROCEDURE — 3077F PR MOST RECENT SYSTOLIC BLOOD PRESSURE >= 140 MM HG: ICD-10-PCS | Mod: CPTII,S$GLB,, | Performed by: COLON & RECTAL SURGERY

## 2022-12-12 PROCEDURE — 1159F PR MEDICATION LIST DOCUMENTED IN MEDICAL RECORD: ICD-10-PCS | Mod: CPTII,S$GLB,, | Performed by: COLON & RECTAL SURGERY

## 2022-12-12 PROCEDURE — 99999 PR PBB SHADOW E&M-EST. PATIENT-LVL III: CPT | Mod: PBBFAC,,, | Performed by: COLON & RECTAL SURGERY

## 2022-12-12 PROCEDURE — 99203 PR OFFICE/OUTPT VISIT, NEW, LEVL III, 30-44 MIN: ICD-10-PCS | Mod: S$GLB,,, | Performed by: COLON & RECTAL SURGERY

## 2022-12-12 PROCEDURE — 3080F PR MOST RECENT DIASTOLIC BLOOD PRESSURE >= 90 MM HG: ICD-10-PCS | Mod: CPTII,S$GLB,, | Performed by: COLON & RECTAL SURGERY

## 2022-12-12 PROCEDURE — 3008F PR BODY MASS INDEX (BMI) DOCUMENTED: ICD-10-PCS | Mod: CPTII,S$GLB,, | Performed by: COLON & RECTAL SURGERY

## 2022-12-12 PROCEDURE — 3080F DIAST BP >= 90 MM HG: CPT | Mod: CPTII,S$GLB,, | Performed by: COLON & RECTAL SURGERY

## 2022-12-12 NOTE — PROGRESS NOTES
History & Physical    SUBJECTIVE:     Cc: RLQ pain  Ref: Silverio Moore    History of Present Illness:  Patient is a 57 y.o. male presents with constipation and RLQ pain. Pain started about 4 months ago, described as nagging and is worse in the evenings, 5/10 at worse. About 4 months ago, had an episode of constipation for a few days then had some melena. Taking miralax and now having regular bowel movements. Has at least 1 BM per day, previously took fiber and SS but finds miralax works better, uses TP and wet wipes, denies hard stools or straining, drinks >64 water, sits <5mins on toilet.  Did have multiple Cts with urology for renal cysts. Planning on getting an ablation and drainage of the cyst. Denies nausea, vomiting, diarrhea, fevers, chills, blood in stool, any more episodes of melena. Patient reports colonoscopy about 3 years ago at [facility] where he was told he had polyps. No previous abdominal surgeries. Reports having an EGD >3 years ago that showed hiatal hernia.       Review of patient's allergies indicates:   Allergen Reactions    Milk Other (See Comments)    Wheat containing prod Hives, Rash, Shortness Of Breath and Swelling    Egg white     Statins-hmg-coa reductase inhibitors Other (See Comments)    Wheat     Adhesive Rash    Polyester fibers Rash       Current Outpatient Medications   Medication Sig Dispense Refill    anastrozole (ARIMIDEX) 1 mg Tab Take 1 tablet (1 mg total) by mouth once daily. 90 tablet 3    aspirin (ECOTRIN) 81 MG EC tablet Take 1 tablet by mouth every morning.      evolocumab (REPATHA SURECLICK) 140 mg/mL PnIj Inject 1 mL (140 mg total) into the skin every 14 (fourteen) days. 6 mL 4    ezetimibe (ZETIA) 10 mg tablet Take 1 tablet (10 mg total) by mouth once daily. 90 tablet 3    metoprolol succinate (TOPROL-XL) 25 MG 24 hr tablet Take 2 tablets (50 mg total) by mouth once daily. 180 tablet 3    pantoprazole (PROTONIX) 40 MG tablet Take 40 mg by mouth.      predniSONE (DELTASONE)  20 MG tablet       tamsulosin (FLOMAX) 0.4 mg Cap Take 1 capsule (0.4 mg total) by mouth once daily. 90 capsule 3    zolpidem (AMBIEN) 10 mg Tab Take 1 tablet (10 mg total) by mouth nightly as needed (insomnia). 30 tablet 2    EPINEPHrine (EPIPEN) 0.3 mg/0.3 mL AtIn Inject 0.3 mLs (0.3 mg total) into the muscle once. for 1 dose 2 each 1    hyoscyamine (ANASPAZ,LEVSIN) 0.125 mg Tab Take 1 tablet (125 mcg total) by mouth 2 (two) times daily as needed. (Patient not taking: Reported on 12/12/2022) 60 tablet 0    traZODone (DESYREL) 50 MG tablet 1 tablet nightly as needed.       No current facility-administered medications for this visit.       Past Medical History:   Diagnosis Date    Hyperlipidemia     Hypertension     NCGS (non-celiac gluten sensitivity)     Polycythemia     Preop cardiovascular exam 11/30/2022    Statin intolerance     Supraventricular tachycardia     SVT (supraventricular tachycardia)      Past Surgical History:   Procedure Laterality Date    DENTAL SURGERY      dental implant    FINGER SURGERY       Family History   Problem Relation Age of Onset    Hypertension Mother     Cancer Father         cancer - leukemia    Cancer Brother         lung     Social History     Tobacco Use    Smoking status: Former     Packs/day: 1.00     Years: 5.00     Pack years: 5.00     Types: Cigarettes    Smokeless tobacco: Never    Tobacco comments:     Quit 19 yo   Substance Use Topics    Alcohol use: Not Currently    Drug use: Not Currently        Review of Systems:  Review of Systems   Constitutional:  Negative for activity change, appetite change, chills, diaphoresis, fatigue, fever and unexpected weight change.   HENT:  Negative for congestion.    Eyes:  Negative for visual disturbance.   Respiratory:  Negative for shortness of breath.    Cardiovascular:  Negative for chest pain.   Gastrointestinal:  Positive for abdominal pain (RLQ). Negative for abdominal distention, anal bleeding, blood in stool, constipation,  diarrhea, nausea, rectal pain and vomiting.   Endocrine: Negative.    Genitourinary:  Negative for dysuria.   Musculoskeletal:  Negative for arthralgias.   Skin:  Negative for rash.   Allergic/Immunologic: Negative.    Neurological:  Negative for light-headedness.   Hematological:  Negative for adenopathy.   Psychiatric/Behavioral: Negative.     All other systems reviewed and are negative.    OBJECTIVE:     Vital Signs (Most Recent)  Temp: 97.7 °F (36.5 °C) (12/12/22 0755)  Pulse: 78 (12/12/22 0755)  BP: (!) 142/95 (12/12/22 0755)     83.3 kg (183 lb 10.3 oz)     Physical Exam:  Physical Exam  Vitals and nursing note reviewed.   Constitutional:       General: He is not in acute distress.     Appearance: Normal appearance. He is well-developed and normal weight. He is not ill-appearing or toxic-appearing.   HENT:      Head: Normocephalic and atraumatic.      Right Ear: External ear normal.      Left Ear: External ear normal.      Nose: Nose normal.   Cardiovascular:      Rate and Rhythm: Normal rate.   Pulmonary:      Effort: Pulmonary effort is normal. No respiratory distress.   Abdominal:      Comments: Soft, nondistended, nontender; specifically no tenderness in RLQ, RUQ or R flank   Musculoskeletal:         General: Normal range of motion.      Cervical back: Normal range of motion and neck supple.   Skin:     General: Skin is warm and dry.   Neurological:      Mental Status: He is alert and oriented to person, place, and time.   Psychiatric:         Mood and Affect: Mood normal.         Behavior: Behavior normal.         Thought Content: Thought content normal.         Judgment: Judgment normal.       Laboratory  none    Diagnostic Results:  CT: Reviewed      ASSESSMENT/PLAN:     56yo male with RLQ/R flank pain with CT showing constipation, renal cyst    - no surgical intervention required at this time. Constipation has been responsive to OTC meds and is now having daily BMs. Unlikely his pain is from  constipation at this point.  - follow up with urology regarding renal cyst. Ablation of cyst may alleviate the RLQ/R flank nagging pain. Evidence of constipation on CT.   - continue miralax since having regular bowel movements  - RTC prn   - discussed getting colonoscopy here within the next year for screening purposes. Can schedule that over phone next year once the aneurysm and renal cyst are addressed       Micah Ha MD  Colon and Rectal Surgery  [facility]

## 2022-12-12 NOTE — H&P (VIEW-ONLY)
History & Physical    SUBJECTIVE:     Cc: RLQ pain  Ref: Silverio Moore    History of Present Illness:  Patient is a 57 y.o. male presents with constipation and RLQ pain. Pain started about 4 months ago, described as nagging and is worse in the evenings, 5/10 at worse. About 4 months ago, had an episode of constipation for a few days then had some melena. Taking miralax and now having regular bowel movements. Has at least 1 BM per day, previously took fiber and SS but finds miralax works better, uses TP and wet wipes, denies hard stools or straining, drinks >64 water, sits <5mins on toilet.  Did have multiple Cts with urology for renal cysts. Planning on getting an ablation and drainage of the cyst. Denies nausea, vomiting, diarrhea, fevers, chills, blood in stool, any more episodes of melena. Patient reports colonoscopy about 3 years ago at OSH where he was told he had polyps. No previous abdominal surgeries. Reports having an EGD >3 years ago that showed hiatal hernia.       Review of patient's allergies indicates:   Allergen Reactions    Milk Other (See Comments)    Wheat containing prod Hives, Rash, Shortness Of Breath and Swelling    Egg white     Statins-hmg-coa reductase inhibitors Other (See Comments)    Wheat     Adhesive Rash    Polyester fibers Rash       Current Outpatient Medications   Medication Sig Dispense Refill    anastrozole (ARIMIDEX) 1 mg Tab Take 1 tablet (1 mg total) by mouth once daily. 90 tablet 3    aspirin (ECOTRIN) 81 MG EC tablet Take 1 tablet by mouth every morning.      evolocumab (REPATHA SURECLICK) 140 mg/mL PnIj Inject 1 mL (140 mg total) into the skin every 14 (fourteen) days. 6 mL 4    ezetimibe (ZETIA) 10 mg tablet Take 1 tablet (10 mg total) by mouth once daily. 90 tablet 3    metoprolol succinate (TOPROL-XL) 25 MG 24 hr tablet Take 2 tablets (50 mg total) by mouth once daily. 180 tablet 3    pantoprazole (PROTONIX) 40 MG tablet Take 40 mg by mouth.      predniSONE (DELTASONE)  20 MG tablet       tamsulosin (FLOMAX) 0.4 mg Cap Take 1 capsule (0.4 mg total) by mouth once daily. 90 capsule 3    zolpidem (AMBIEN) 10 mg Tab Take 1 tablet (10 mg total) by mouth nightly as needed (insomnia). 30 tablet 2    EPINEPHrine (EPIPEN) 0.3 mg/0.3 mL AtIn Inject 0.3 mLs (0.3 mg total) into the muscle once. for 1 dose 2 each 1    hyoscyamine (ANASPAZ,LEVSIN) 0.125 mg Tab Take 1 tablet (125 mcg total) by mouth 2 (two) times daily as needed. (Patient not taking: Reported on 12/12/2022) 60 tablet 0    traZODone (DESYREL) 50 MG tablet 1 tablet nightly as needed.       No current facility-administered medications for this visit.       Past Medical History:   Diagnosis Date    Hyperlipidemia     Hypertension     NCGS (non-celiac gluten sensitivity)     Polycythemia     Preop cardiovascular exam 11/30/2022    Statin intolerance     Supraventricular tachycardia     SVT (supraventricular tachycardia)      Past Surgical History:   Procedure Laterality Date    DENTAL SURGERY      dental implant    FINGER SURGERY       Family History   Problem Relation Age of Onset    Hypertension Mother     Cancer Father         cancer - leukemia    Cancer Brother         lung     Social History     Tobacco Use    Smoking status: Former     Packs/day: 1.00     Years: 5.00     Pack years: 5.00     Types: Cigarettes    Smokeless tobacco: Never    Tobacco comments:     Quit 17 yo   Substance Use Topics    Alcohol use: Not Currently    Drug use: Not Currently        Review of Systems:  Review of Systems   Constitutional:  Negative for activity change, appetite change, chills, diaphoresis, fatigue, fever and unexpected weight change.   HENT:  Negative for congestion.    Eyes:  Negative for visual disturbance.   Respiratory:  Negative for shortness of breath.    Cardiovascular:  Negative for chest pain.   Gastrointestinal:  Positive for abdominal pain (RLQ). Negative for abdominal distention, anal bleeding, blood in stool, constipation,  diarrhea, nausea, rectal pain and vomiting.   Endocrine: Negative.    Genitourinary:  Negative for dysuria.   Musculoskeletal:  Negative for arthralgias.   Skin:  Negative for rash.   Allergic/Immunologic: Negative.    Neurological:  Negative for light-headedness.   Hematological:  Negative for adenopathy.   Psychiatric/Behavioral: Negative.     All other systems reviewed and are negative.    OBJECTIVE:     Vital Signs (Most Recent)  Temp: 97.7 °F (36.5 °C) (12/12/22 0755)  Pulse: 78 (12/12/22 0755)  BP: (!) 142/95 (12/12/22 0755)     83.3 kg (183 lb 10.3 oz)     Physical Exam:  Physical Exam  Vitals and nursing note reviewed.   Constitutional:       General: He is not in acute distress.     Appearance: Normal appearance. He is well-developed and normal weight. He is not ill-appearing or toxic-appearing.   HENT:      Head: Normocephalic and atraumatic.      Right Ear: External ear normal.      Left Ear: External ear normal.      Nose: Nose normal.   Cardiovascular:      Rate and Rhythm: Normal rate.   Pulmonary:      Effort: Pulmonary effort is normal. No respiratory distress.   Abdominal:      Comments: Soft, nondistended, nontender; specifically no tenderness in RLQ, RUQ or R flank   Musculoskeletal:         General: Normal range of motion.      Cervical back: Normal range of motion and neck supple.   Skin:     General: Skin is warm and dry.   Neurological:      Mental Status: He is alert and oriented to person, place, and time.   Psychiatric:         Mood and Affect: Mood normal.         Behavior: Behavior normal.         Thought Content: Thought content normal.         Judgment: Judgment normal.       Laboratory  none    Diagnostic Results:  CT: Reviewed      ASSESSMENT/PLAN:     58yo male with RLQ/R flank pain with CT showing constipation, renal cyst    - no surgical intervention required at this time. Constipation has been responsive to OTC meds and is now having daily BMs. Unlikely his pain is from  constipation at this point.  - follow up with urology regarding renal cyst. Ablation of cyst may alleviate the RLQ/R flank nagging pain. Evidence of constipation on CT.   - continue miralax since having regular bowel movements  - RTC prn   - discussed getting colonoscopy here within the next year for screening purposes. Can schedule that over phone next year once the aneurysm and renal cyst are addressed       Micah Ha MD  Colon and Rectal Surgery  [facility]

## 2022-12-19 ENCOUNTER — OFFICE VISIT (OUTPATIENT)
Dept: ALLERGY | Facility: CLINIC | Age: 57
End: 2022-12-19
Payer: COMMERCIAL

## 2022-12-19 VITALS
TEMPERATURE: 97 F | DIASTOLIC BLOOD PRESSURE: 88 MMHG | SYSTOLIC BLOOD PRESSURE: 126 MMHG | BODY MASS INDEX: 27.01 KG/M2 | HEIGHT: 70 IN | HEART RATE: 83 BPM | WEIGHT: 188.69 LBS

## 2022-12-19 DIAGNOSIS — Z91.018 FOOD ALLERGY: ICD-10-CM

## 2022-12-19 DIAGNOSIS — L23.89 ALLERGIC CONTACT DERMATITIS DUE TO OTHER AGENTS: Primary | ICD-10-CM

## 2022-12-19 DIAGNOSIS — J31.0 RHINITIS, UNSPECIFIED TYPE: ICD-10-CM

## 2022-12-19 DIAGNOSIS — L50.1 CHRONIC IDIOPATHIC URTICARIA: ICD-10-CM

## 2022-12-19 PROCEDURE — 3008F PR BODY MASS INDEX (BMI) DOCUMENTED: ICD-10-PCS | Mod: CPTII,S$GLB,, | Performed by: ALLERGY & IMMUNOLOGY

## 2022-12-19 PROCEDURE — 3079F DIAST BP 80-89 MM HG: CPT | Mod: CPTII,S$GLB,, | Performed by: ALLERGY & IMMUNOLOGY

## 2022-12-19 PROCEDURE — 3074F SYST BP LT 130 MM HG: CPT | Mod: CPTII,S$GLB,, | Performed by: ALLERGY & IMMUNOLOGY

## 2022-12-19 PROCEDURE — 3074F PR MOST RECENT SYSTOLIC BLOOD PRESSURE < 130 MM HG: ICD-10-PCS | Mod: CPTII,S$GLB,, | Performed by: ALLERGY & IMMUNOLOGY

## 2022-12-19 PROCEDURE — 3008F BODY MASS INDEX DOCD: CPT | Mod: CPTII,S$GLB,, | Performed by: ALLERGY & IMMUNOLOGY

## 2022-12-19 PROCEDURE — 99999 PR PBB SHADOW E&M-EST. PATIENT-LVL IV: ICD-10-PCS | Mod: PBBFAC,,, | Performed by: ALLERGY & IMMUNOLOGY

## 2022-12-19 PROCEDURE — 99214 OFFICE O/P EST MOD 30 MIN: CPT | Mod: S$GLB,,, | Performed by: ALLERGY & IMMUNOLOGY

## 2022-12-19 PROCEDURE — 3079F PR MOST RECENT DIASTOLIC BLOOD PRESSURE 80-89 MM HG: ICD-10-PCS | Mod: CPTII,S$GLB,, | Performed by: ALLERGY & IMMUNOLOGY

## 2022-12-19 PROCEDURE — 1159F PR MEDICATION LIST DOCUMENTED IN MEDICAL RECORD: ICD-10-PCS | Mod: CPTII,S$GLB,, | Performed by: ALLERGY & IMMUNOLOGY

## 2022-12-19 PROCEDURE — 99999 PR PBB SHADOW E&M-EST. PATIENT-LVL IV: CPT | Mod: PBBFAC,,, | Performed by: ALLERGY & IMMUNOLOGY

## 2022-12-19 PROCEDURE — 99214 PR OFFICE/OUTPT VISIT, EST, LEVL IV, 30-39 MIN: ICD-10-PCS | Mod: S$GLB,,, | Performed by: ALLERGY & IMMUNOLOGY

## 2022-12-19 PROCEDURE — 1159F MED LIST DOCD IN RCRD: CPT | Mod: CPTII,S$GLB,, | Performed by: ALLERGY & IMMUNOLOGY

## 2022-12-19 NOTE — PROGRESS NOTES
"Subjective:       Patient ID: Harish Busby is a 57 y.o. male.    Chief Complaint:  Allergies        HPI 12/6/2022: 57 year old male complaining of an allergy to polyester. He is to have a surgery and have dacron placed. He is on prednisone for allergies.    "Ige blood test"  Avoiding dairy and wheat  "Prednisone helps."  Prednisone 5mg most days, failed antihistamines for hives  Anaphylaxis once- unsure of etiology- thanksgiving 4 years ago  No Epipen     Triple A surgery with Dr. Sam.    HPI:  Itching and hives off of prednisone; failed prednisone  Became itchy with polyester gown used for patch testing.  He would like to try Xolair.    Past Medical History:   Diagnosis Date    Hyperlipidemia     Hypertension     NCGS (non-celiac gluten sensitivity)     Polycythemia     Preop cardiovascular exam 11/30/2022    Statin intolerance     Supraventricular tachycardia     SVT (supraventricular tachycardia)         Family History   Problem Relation Age of Onset    Hypertension Mother     Cancer Father         cancer - leukemia    Cancer Brother         lung        Current Outpatient Medications on File Prior to Visit   Medication Sig Dispense Refill    anastrozole (ARIMIDEX) 1 mg Tab Take 1 tablet (1 mg total) by mouth once daily. 90 tablet 3    aspirin (ECOTRIN) 81 MG EC tablet Take 1 tablet by mouth every morning.      evolocumab (REPATHA SURECLICK) 140 mg/mL PnIj Inject 1 mL (140 mg total) into the skin every 14 (fourteen) days. 6 mL 4    ezetimibe (ZETIA) 10 mg tablet Take 1 tablet (10 mg total) by mouth once daily. 90 tablet 3    hyoscyamine (ANASPAZ,LEVSIN) 0.125 mg Tab Take 1 tablet (125 mcg total) by mouth 2 (two) times daily as needed. 60 tablet 0    metoprolol succinate (TOPROL-XL) 25 MG 24 hr tablet Take 2 tablets (50 mg total) by mouth once daily. 180 tablet 3    pantoprazole (PROTONIX) 40 MG tablet Take 40 mg by mouth.      tamsulosin (FLOMAX) 0.4 mg Cap Take 1 capsule (0.4 mg total) by mouth once daily. " 90 capsule 3    traZODone (DESYREL) 50 MG tablet 1 tablet nightly as needed.      zolpidem (AMBIEN) 10 mg Tab Take 1 tablet (10 mg total) by mouth nightly as needed (insomnia). 30 tablet 2    predniSONE (DELTASONE) 20 MG tablet       [DISCONTINUED] EPINEPHrine (EPIPEN) 0.3 mg/0.3 mL AtIn Inject 0.3 mLs (0.3 mg total) into the muscle once. for 1 dose 2 each 1     No current facility-administered medications on file prior to visit.        Review of patient's allergies indicates:   Allergen Reactions    Milk Other (See Comments)    Wheat containing prod Hives, Rash, Shortness Of Breath and Swelling    Egg white     Statins-hmg-coa reductase inhibitors Other (See Comments)    Wheat     Adhesive Rash    Polyester fibers Rash        Environmental History: non-contributory  Review of Systems   HENT:  Negative for congestion and rhinorrhea.    Eyes:  Negative for discharge and itching.   Respiratory:  Negative for shortness of breath.    Allergic/Immunologic: Negative for environmental allergies and food allergies.   Neurological:  Negative for facial asymmetry and speech difficulty.   Psychiatric/Behavioral:  Negative for behavioral problems and suicidal ideas.    All other systems reviewed and are negative.     Objective:    Physical Exam  Vitals reviewed.   Constitutional:       General: He is not in acute distress.     Appearance: Normal appearance. He is not ill-appearing, toxic-appearing or diaphoretic.   HENT:      Head: Normocephalic and atraumatic.      Right Ear: External ear normal. There is no impacted cerumen.      Left Ear: Tympanic membrane and external ear normal.      Nose: Nose normal. No congestion or rhinorrhea.      Mouth/Throat:      Mouth: Mucous membranes are moist.      Pharynx: No oropharyngeal exudate or posterior oropharyngeal erythema.   Eyes:      General: No scleral icterus.        Right eye: No discharge.         Left eye: No discharge.      Pupils: Pupils are equal, round, and reactive to  light.   Cardiovascular:      Rate and Rhythm: Normal rate and regular rhythm.      Heart sounds: Normal heart sounds. No murmur heard.    No friction rub. No gallop.   Pulmonary:      Effort: Pulmonary effort is normal. No respiratory distress.      Breath sounds: Normal breath sounds. No stridor. No wheezing, rhonchi or rales.   Chest:      Chest wall: No tenderness.   Abdominal:      General: There is no distension.      Palpations: There is no mass.      Tenderness: There is no abdominal tenderness. There is no guarding or rebound.      Hernia: No hernia is present.   Musculoskeletal:         General: No swelling, tenderness, deformity or signs of injury. Normal range of motion.      Right lower leg: No edema.      Left lower leg: No edema.   Skin:     General: Skin is warm.      Coloration: Skin is not jaundiced.      Findings: No lesion.   Neurological:      General: No focal deficit present.      Mental Status: He is alert and oriented to person, place, and time.      Gait: Gait normal.   Psychiatric:         Mood and Affect: Mood normal.         Behavior: Behavior normal.         Thought Content: Thought content normal.         Judgment: Judgment normal.          After placing the gown on his skin in preparation for patch testing, he declined patch testing.    Assessment:       1. Allergic contact dermatitis due to other agents    2. Rhinitis, unspecified type    3. Food allergy    4. Chronic idiopathic urticaria           Plan:        Allergic contact dermatitis due to other agents    Rhinitis, unspecified type    Food allergy  -     EPINEPHrine (EPIPEN) 0.3 mg/0.3 mL AtIn; Inject 0.3 mLs (0.3 mg total) into the muscle once. for 1 dose  Dispense: 2 each; Refill: 1    Chronic idiopathic urticaria  -     omalizumab (XOLAIR) 150 mg/mL injection; Inject 2 mLs (300 mg total) into the skin every 28 days.  Dispense: 2 each; Refill: 11      Failed antihistamines and takes prednisone for hives and itching.   He  declined patch testing, but signed consent for Xolair.  Discussed the side effects associated with long term steroid use. Recommend Xolair 300mg every 28 days.    Xolair ordered.  Epipen 2 pack    RTC 6 months     INDY ROSENTHAL spent a total of 30 minutes on the day of the visit.  This includes face to face time and non-face to face time preparing to see the patient (eg, review of tests), obtaining and/or reviewing separately obtained history, documenting clinical information in the electronic or other health record, independently interpreting results and communicating results to the patient/family/caregiver, or care coordinator.

## 2022-12-20 ENCOUNTER — PATIENT MESSAGE (OUTPATIENT)
Dept: ALLERGY | Facility: CLINIC | Age: 57
End: 2022-12-20
Payer: COMMERCIAL

## 2022-12-20 RX ORDER — OMALIZUMAB 150 MG/ML
300 INJECTION, SOLUTION SUBCUTANEOUS
Qty: 2 EACH | Refills: 11 | Status: SHIPPED | OUTPATIENT
Start: 2022-12-20 | End: 2023-02-28

## 2022-12-20 RX ORDER — EPINEPHRINE 0.3 MG/.3ML
1 INJECTION SUBCUTANEOUS ONCE
Qty: 2 EACH | Refills: 1 | Status: SHIPPED | OUTPATIENT
Start: 2022-12-20 | End: 2023-02-22

## 2022-12-21 ENCOUNTER — SPECIALTY PHARMACY (OUTPATIENT)
Dept: PHARMACY | Facility: CLINIC | Age: 57
End: 2022-12-21
Payer: COMMERCIAL

## 2022-12-21 DIAGNOSIS — L50.1 CHRONIC IDIOPATHIC URTICARIA: Primary | ICD-10-CM

## 2022-12-21 NOTE — TELEPHONE ENCOUNTER
PA Case ID: [id number] Close reason: Other   Note from payer: This medication may be excluded from the patient's benefit.  Product/Service Not Covered- Plan/Benefit Exclusion- Medical RAYMOND French, this is Alice Camacho with [facility].  We are working on your prescription that your doctor has sent us. We will be working with your insurance to get this approved for you. We will be calling you along the way with updates on your medication.  If you have any questions, you can reach us at [phone].    Welcome call outcome: Patient/caregiver reached    Outgoing call to pt to inform. May bill through medical benefits via [employer]. Staff message sent to MD/MDO.

## 2022-12-27 ENCOUNTER — TELEPHONE (OUTPATIENT)
Dept: RADIOLOGY | Facility: HOSPITAL | Age: 57
End: 2022-12-27
Payer: COMMERCIAL

## 2022-12-27 DIAGNOSIS — D68.9 COAGULATION DEFECT, UNSPECIFIED: Primary | ICD-10-CM

## 2022-12-27 DIAGNOSIS — E78.5 DYSLIPIDEMIA: ICD-10-CM

## 2022-12-27 DIAGNOSIS — I10 PRIMARY HYPERTENSION: ICD-10-CM

## 2022-12-27 DIAGNOSIS — N28.1 KIDNEY CYSTS: ICD-10-CM

## 2022-12-27 NOTE — TELEPHONE ENCOUNTER
Called patient and confirmed radiology procedure appointment for 12/28/22 at 0930. Instructed pt to be NPO after midnight tonight, take BP meds in morning with a few sips of water only, arrive to [facility] on [address] at 0800, and have a ride home post procedure. Pt verbalized understanding and had all questions answered. Pt confirmed no blood thinners are being taken and last aspirin was 1 week ago.

## 2022-12-28 ENCOUNTER — PATIENT MESSAGE (OUTPATIENT)
Dept: UROLOGY | Facility: CLINIC | Age: 57
End: 2022-12-28
Payer: COMMERCIAL

## 2022-12-28 ENCOUNTER — HOSPITAL ENCOUNTER (OUTPATIENT)
Dept: RADIOLOGY | Facility: HOSPITAL | Age: 57
Discharge: HOME | End: 2022-12-28
Attending: UROLOGY
Payer: COMMERCIAL

## 2022-12-28 VITALS
WEIGHT: 180 LBS | DIASTOLIC BLOOD PRESSURE: 82 MMHG | OXYGEN SATURATION: 94 % | SYSTOLIC BLOOD PRESSURE: 139 MMHG | HEART RATE: 70 BPM | HEIGHT: 70 IN | RESPIRATION RATE: 16 BRPM | BODY MASS INDEX: 25.77 KG/M2

## 2022-12-28 DIAGNOSIS — N28.1 KIDNEY CYSTS: ICD-10-CM

## 2022-12-28 PROCEDURE — 63600175 PHARM REV CODE 636 W HCPCS: Performed by: RADIOLOGY

## 2022-12-28 PROCEDURE — C9399 UNCLASSIFIED DRUGS OR BIOLOG: HCPCS | Performed by: RADIOLOGY

## 2022-12-28 PROCEDURE — C1729 CATH, DRAINAGE: HCPCS

## 2022-12-28 RX ORDER — CEFAZOLIN SODIUM 2 G/50ML
2 SOLUTION INTRAVENOUS ONCE
Status: COMPLETED | OUTPATIENT
Start: 2022-12-28 | End: 2022-12-28

## 2022-12-28 RX ORDER — KETOROLAC TROMETHAMINE 30 MG/ML
30 INJECTION, SOLUTION INTRAMUSCULAR; INTRAVENOUS ONCE
Status: COMPLETED | OUTPATIENT
Start: 2022-12-28 | End: 2022-12-28

## 2022-12-28 RX ORDER — FENTANYL CITRATE 50 UG/ML
INJECTION, SOLUTION INTRAMUSCULAR; INTRAVENOUS CODE/TRAUMA/SEDATION MEDICATION
Status: COMPLETED | OUTPATIENT
Start: 2022-12-28 | End: 2022-12-28

## 2022-12-28 RX ORDER — MIDAZOLAM HYDROCHLORIDE 1 MG/ML
INJECTION INTRAMUSCULAR; INTRAVENOUS CODE/TRAUMA/SEDATION MEDICATION
Status: COMPLETED | OUTPATIENT
Start: 2022-12-28 | End: 2022-12-28

## 2022-12-28 RX ORDER — HYDROMORPHONE HYDROCHLORIDE 2 MG/ML
1 INJECTION, SOLUTION INTRAMUSCULAR; INTRAVENOUS; SUBCUTANEOUS ONCE
Status: COMPLETED | OUTPATIENT
Start: 2022-12-28 | End: 2022-12-28

## 2022-12-28 RX ADMIN — CEFAZOLIN SODIUM 2 G: 2 SOLUTION INTRAVENOUS at 10:12

## 2022-12-28 RX ADMIN — KETOROLAC TROMETHAMINE 30 MG: 30 INJECTION, SOLUTION INTRAMUSCULAR; INTRAVENOUS at 12:12

## 2022-12-28 RX ADMIN — FENTANYL CITRATE 50 MCG: 50 INJECTION, SOLUTION INTRAMUSCULAR; INTRAVENOUS at 10:12

## 2022-12-28 RX ADMIN — MIDAZOLAM HYDROCHLORIDE 1 MG: 1 INJECTION INTRAMUSCULAR; INTRAVENOUS at 10:12

## 2022-12-28 RX ADMIN — ALCOHOL 15 ML: 1 INJECTION, SOLUTION PERCUTANEOUS at 10:12

## 2022-12-28 RX ADMIN — HYDROMORPHONE HYDROCHLORIDE 1 MG: 2 INJECTION INTRAMUSCULAR; INTRAVENOUS; SUBCUTANEOUS at 10:12

## 2022-12-28 NOTE — PLAN OF CARE
Patient ambulated from waiting room to preop area with no distress. VSS. Patient sitting comfortably in preop, awaiting procedure.

## 2022-12-28 NOTE — SEDATION DOCUMENTATION
Pt transferred to stretcher in prone position and transported to recovery. Bedside report given to Karmen RN. IV ancef infusing into RFA PIV. Pt reports 6/10 site pain.

## 2022-12-28 NOTE — SEDATION DOCUMENTATION
Procedure completed at this time. Right renal cyst drain catheter secured by stat lock with stopcock closed. Alcohol instilled by MD into cyst. Pt currently prone and will be repositioned q30 minutes x4 sides. Pt tolerated well. VSS and NADN.

## 2022-12-28 NOTE — SEDATION DOCUMENTATION
Pt positioned prone on CT table and CM applied. VS taken and stable; NADN. Pt verbalized understanding of procedure.

## 2022-12-29 ENCOUNTER — SPECIALTY PHARMACY (OUTPATIENT)
Dept: PHARMACY | Facility: CLINIC | Age: 57
End: 2022-12-29
Payer: COMMERCIAL

## 2022-12-29 DIAGNOSIS — N20.0 KIDNEY STONES: Primary | ICD-10-CM

## 2022-12-29 RX ORDER — OXYCODONE AND ACETAMINOPHEN 5; 325 MG/1; MG/1
1 TABLET ORAL EVERY 4 HOURS PRN
Qty: 10 TABLET | Refills: 0 | Status: SHIPPED | OUTPATIENT
Start: 2022-12-29 | End: 2023-02-22

## 2022-12-29 NOTE — TELEPHONE ENCOUNTER
Outgoing call regarding repatha refill; per pt, he's due to inject on 12/2 or 12/3; informed him that it's too soon to fill until 12/30, and [facility] will follow up on that date to schedule delivery

## 2022-12-30 NOTE — TELEPHONE ENCOUNTER
Specialty Pharmacy - Refill Coordination    Specialty Medication Orders Linked to Encounter      Flowsheet Row Most Recent Value   Medication #1 evolocumab (REPATHA SURECLICK) 140 mg/mL PnIj (Order#[id number], Rx#[id number])            Refill Questions - Documented Responses      Flowsheet Row Most Recent Value   Patient Availability and HIPAA Verification    Does patient want to proceed with activity? Yes   HIPAA/medical authority confirmed? Yes   Relationship to patient of person spoken to? Self   Refill Screening Questions    Would patient like to speak to a pharmacist? No   When does the patient need to receive the medication? 01/04/23   Refill Delivery Questions    How will the patient receive the medication? [facility]   When does the patient need to receive the medication? 01/04/23   Shipping Address Home   Address in [facility] confirmed and updated if neccessary? Yes   Expected Copay ($) 5   Is the patient able to afford the medication copay? Yes   Payment Method CC on file   Days supply of Refill 28   Supplies needed? No supplies needed   Refill activity completed? Yes   Refill activity plan Refill scheduled   Shipment/Pickup Date: 12/30/22            Current Outpatient Medications   Medication Sig    anastrozole (ARIMIDEX) 1 mg Tab Take 1 tablet (1 mg total) by mouth once daily.    aspirin (ECOTRIN) 81 MG EC tablet Take 1 tablet by mouth every morning.    EPINEPHrine (EPIPEN) 0.3 mg/0.3 mL AtIn Inject 0.3 mLs (0.3 mg total) into the muscle once. for 1 dose    evolocumab (REPATHA SURECLICK) 140 mg/mL PnIj Inject 1 mL (140 mg total) into the skin every 14 (fourteen) days.    ezetimibe (ZETIA) 10 mg tablet Take 1 tablet (10 mg total) by mouth once daily.    hyoscyamine (ANASPAZ,LEVSIN) 0.125 mg Tab Take 1 tablet (125 mcg total) by mouth 2 (two) times daily as needed.    metoprolol succinate (TOPROL-XL) 25 MG 24 hr tablet Take 2 tablets (50 mg total) by mouth once daily.    omalizumab (XOLAIR) 150 mg/mL  injection Inject 2 mLs (300 mg total) into the skin every 28 days.    oxyCODONE-acetaminophen (PERCOCET) 5-325 mg per tablet Take 1 tablet by mouth every 4 (four) hours as needed for Pain.    pantoprazole (PROTONIX) 40 MG tablet Take 40 mg by mouth.    predniSONE (DELTASONE) 20 MG tablet     tamsulosin (FLOMAX) 0.4 mg Cap Take 1 capsule (0.4 mg total) by mouth once daily.    traZODone (DESYREL) 50 MG tablet 1 tablet nightly as needed.    zolpidem (AMBIEN) 10 mg Tab Take 1 tablet (10 mg total) by mouth nightly as needed (insomnia).   Last reviewed on 12/28/2022  8:20 AM by Kaila Bronson RN    Review of patient's allergies indicates:   Allergen Reactions    Milk Other (See Comments)    Wheat containing prod Hives, Rash, Shortness Of Breath and Swelling    Egg white     Statins-hmg-coa reductase inhibitors Other (See Comments)    Wheat     Adhesive Rash    Polyester fibers Rash    Last reviewed on  12/28/2022 9:22 AM by Lewis Rabago      Tasks added this encounter   1/25/2023 - Refill Call (Auto Added)   Tasks due within next 3 months   No tasks due.     [facility]  [address] [facility] [address]  Phone: [phone]  Fax: [phone]

## 2023-01-10 ENCOUNTER — CLINICAL SUPPORT (OUTPATIENT)
Dept: ALLERGY | Facility: CLINIC | Age: 58
End: 2023-01-10
Payer: COMMERCIAL

## 2023-01-10 ENCOUNTER — PATIENT MESSAGE (OUTPATIENT)
Dept: ALLERGY | Facility: CLINIC | Age: 58
End: 2023-01-10

## 2023-01-10 DIAGNOSIS — L50.1 CHRONIC IDIOPATHIC URTICARIA: Primary | ICD-10-CM

## 2023-01-10 PROCEDURE — 99999 PR PBB SHADOW E&M-EST. PATIENT-LVL II: ICD-10-PCS | Mod: PBBFAC,,,

## 2023-01-10 PROCEDURE — 99999 PR PBB SHADOW E&M-EST. PATIENT-LVL II: CPT | Mod: PBBFAC,,,

## 2023-01-10 PROCEDURE — 96372 THER/PROPH/DIAG INJ SC/IM: CPT | Mod: S$GLB,,, | Performed by: ALLERGY & IMMUNOLOGY

## 2023-01-10 PROCEDURE — 96372 PR INJECTION,THERAP/PROPH/DIAG2ST, IM OR SUBCUT: ICD-10-PCS | Mod: S$GLB,,, | Performed by: ALLERGY & IMMUNOLOGY

## 2023-01-10 NOTE — PROGRESS NOTES
Xolair administered. Patient waited in clinic for 2 hours for observation. Pt had epi pen on hand. No reaction.

## 2023-01-19 ENCOUNTER — OFFICE VISIT (OUTPATIENT)
Dept: URGENT CARE | Facility: CLINIC | Age: 58
End: 2023-01-19
Payer: COMMERCIAL

## 2023-01-19 VITALS
HEART RATE: 100 BPM | BODY MASS INDEX: 25.77 KG/M2 | TEMPERATURE: 98 F | SYSTOLIC BLOOD PRESSURE: 135 MMHG | DIASTOLIC BLOOD PRESSURE: 98 MMHG | OXYGEN SATURATION: 99 % | HEIGHT: 70 IN | RESPIRATION RATE: 18 BRPM | WEIGHT: 180 LBS

## 2023-01-19 DIAGNOSIS — B02.9 HERPES ZOSTER WITHOUT COMPLICATION: Primary | ICD-10-CM

## 2023-01-19 PROCEDURE — 1160F PR REVIEW ALL MEDS BY PRESCRIBER/CLIN PHARMACIST DOCUMENTED: ICD-10-PCS | Mod: CPTII,S$GLB,, | Performed by: PHYSICIAN ASSISTANT

## 2023-01-19 PROCEDURE — 3008F PR BODY MASS INDEX (BMI) DOCUMENTED: ICD-10-PCS | Mod: CPTII,S$GLB,, | Performed by: PHYSICIAN ASSISTANT

## 2023-01-19 PROCEDURE — 3075F SYST BP GE 130 - 139MM HG: CPT | Mod: CPTII,S$GLB,, | Performed by: PHYSICIAN ASSISTANT

## 2023-01-19 PROCEDURE — 3008F BODY MASS INDEX DOCD: CPT | Mod: CPTII,S$GLB,, | Performed by: PHYSICIAN ASSISTANT

## 2023-01-19 PROCEDURE — 99214 PR OFFICE/OUTPT VISIT, EST, LEVL IV, 30-39 MIN: ICD-10-PCS | Mod: S$GLB,,, | Performed by: PHYSICIAN ASSISTANT

## 2023-01-19 PROCEDURE — 3080F DIAST BP >= 90 MM HG: CPT | Mod: CPTII,S$GLB,, | Performed by: PHYSICIAN ASSISTANT

## 2023-01-19 PROCEDURE — 3075F PR MOST RECENT SYSTOLIC BLOOD PRESS GE 130-139MM HG: ICD-10-PCS | Mod: CPTII,S$GLB,, | Performed by: PHYSICIAN ASSISTANT

## 2023-01-19 PROCEDURE — 3080F PR MOST RECENT DIASTOLIC BLOOD PRESSURE >= 90 MM HG: ICD-10-PCS | Mod: CPTII,S$GLB,, | Performed by: PHYSICIAN ASSISTANT

## 2023-01-19 PROCEDURE — 1159F MED LIST DOCD IN RCRD: CPT | Mod: CPTII,S$GLB,, | Performed by: PHYSICIAN ASSISTANT

## 2023-01-19 PROCEDURE — 1159F PR MEDICATION LIST DOCUMENTED IN MEDICAL RECORD: ICD-10-PCS | Mod: CPTII,S$GLB,, | Performed by: PHYSICIAN ASSISTANT

## 2023-01-19 PROCEDURE — 1160F RVW MEDS BY RX/DR IN RCRD: CPT | Mod: CPTII,S$GLB,, | Performed by: PHYSICIAN ASSISTANT

## 2023-01-19 PROCEDURE — 99214 OFFICE O/P EST MOD 30 MIN: CPT | Mod: S$GLB,,, | Performed by: PHYSICIAN ASSISTANT

## 2023-01-19 RX ORDER — HYDROCODONE BITARTRATE AND ACETAMINOPHEN 7.5; 325 MG/1; MG/1
1 TABLET ORAL EVERY 6 HOURS PRN
Qty: 20 TABLET | Refills: 0 | Status: SHIPPED | OUTPATIENT
Start: 2023-01-19 | End: 2023-01-24

## 2023-01-19 RX ORDER — VALACYCLOVIR HYDROCHLORIDE 1 G/1
1000 TABLET, FILM COATED ORAL EVERY 8 HOURS
Qty: 21 TABLET | Refills: 0 | Status: SHIPPED | OUTPATIENT
Start: 2023-01-19 | End: 2023-02-22

## 2023-01-19 RX ORDER — PREDNISONE 20 MG/1
TABLET ORAL
Qty: 18 TABLET | Refills: 0 | Status: SHIPPED | OUTPATIENT
Start: 2023-01-19 | End: 2023-01-28

## 2023-01-19 RX ORDER — KETOROLAC TROMETHAMINE 10 MG/1
10 TABLET, FILM COATED ORAL EVERY 6 HOURS
Qty: 20 TABLET | Refills: 0 | Status: SHIPPED | OUTPATIENT
Start: 2023-01-19 | End: 2023-01-24

## 2023-01-19 RX ORDER — HYDROCODONE BITARTRATE AND ACETAMINOPHEN 10; 325 MG/1; MG/1
1 TABLET ORAL EVERY 6 HOURS PRN
Qty: 20 TABLET | Refills: 0 | Status: SHIPPED | OUTPATIENT
Start: 2023-01-19 | End: 2023-01-19

## 2023-01-19 NOTE — PROGRESS NOTES
"Subjective:       Patient ID: Harish Busby is a 57 y.o. male.    Vitals:  height is 5' 10" (1.778 m) and weight is 81.6 kg (180 lb). His oral temperature is 98.3 °F (36.8 °C). His blood pressure is 135/98 (abnormal) and his pulse is 100. His respiration is 18 and oxygen saturation is 99%.     Chief Complaint: Rash (Right side)    Pt presents with painful rash to right side body from right buttocks to groin. Pt thinks possible shingles    Rash  This is a new problem. The current episode started today. The problem has been gradually worsening since onset. The rash is characterized by blistering, itchiness, pain and redness. Associated symptoms include fatigue. Pertinent negatives include no fever.     Constitution: Positive for fatigue. Negative for fever.   Skin:  Positive for rash.     Objective:      Physical Exam   Constitutional: He is oriented to person, place, and time. He appears well-developed. He is cooperative.  Non-toxic appearance. He does not appear ill. No distress.   HENT:   Head: Normocephalic and atraumatic.   Ears:   Right Ear: Hearing, tympanic membrane, external ear and ear canal normal.   Left Ear: Hearing, tympanic membrane, external ear and ear canal normal.   Nose: Nose normal. No mucosal edema, rhinorrhea or nasal deformity. No epistaxis. Right sinus exhibits no maxillary sinus tenderness and no frontal sinus tenderness. Left sinus exhibits no maxillary sinus tenderness and no frontal sinus tenderness.   Mouth/Throat: Uvula is midline, oropharynx is clear and moist and mucous membranes are normal. No trismus in the jaw. Normal dentition. No uvula swelling. No posterior oropharyngeal erythema.   Eyes: Conjunctivae and lids are normal. Right eye exhibits no discharge. Left eye exhibits no discharge. No scleral icterus.   Neck: Trachea normal and phonation normal. Neck supple.   Cardiovascular: Normal rate, regular rhythm, normal heart sounds and normal pulses.   Pulmonary/Chest: Effort " normal and breath sounds normal. No respiratory distress.   Abdominal: Normal appearance and bowel sounds are normal. He exhibits no distension and no mass. Soft. There is no abdominal tenderness.   Musculoskeletal: Normal range of motion.         General: No deformity. Normal range of motion.   Neurological: He is alert and oriented to person, place, and time. He exhibits normal muscle tone. Coordination normal.   Skin: Skin is warm, dry, intact, not diaphoretic, not pale, rash (to right buttock, right upper leg and right groin), macular, vesicular and papular.        Psychiatric: His speech is normal and behavior is normal. Judgment and thought content normal.   Nursing note and vitals reviewed.      Assessment:       1. Herpes zoster without complication          Plan:         Herpes zoster without complication  -     valACYclovir (VALTREX) 1000 MG tablet; Take 1 tablet (1,000 mg total) by mouth every 8 (eight) hours. for 7 days  Dispense: 21 tablet; Refill: 0  -     predniSONE (DELTASONE) 20 MG tablet; Take 1 tablet (20 mg total) by mouth 3 (three) times daily for 3 days, THEN 1 tablet (20 mg total) 2 (two) times daily for 3 days, THEN 1 tablet (20 mg total) once daily for 3 days.  Dispense: 18 tablet; Refill: 0  -     ketorolac (TORADOL) 10 mg tablet; Take 1 tablet (10 mg total) by mouth every 6 (six) hours. for 5 days  Dispense: 20 tablet; Refill: 0  -     HYDROcodone-acetaminophen (NORCO)  mg per tablet; Take 1 tablet by mouth every 6 (six) hours as needed for Pain.  Dispense: 20 tablet; Refill: 0    Loree Tavarez PA-C  [facility]       Patient Instructions   Take valtrex antiviral and prednisone for inflammation. Toradol for pain. Norco for breakthrough pain. Keep covered. You may spread to those that have not had chicken pox. It is considered contagious until blisters have healed.

## 2023-01-20 NOTE — PATIENT INSTRUCTIONS
Take valtrex antiviral and prednisone for inflammation. Toradol for pain. Norco for breakthrough pain. Keep covered. You may spread to those that have not had chicken pox. It is considered contagious until blisters have healed.

## 2023-01-28 ENCOUNTER — OFFICE VISIT (OUTPATIENT)
Dept: URGENT CARE | Facility: CLINIC | Age: 58
End: 2023-01-28
Payer: COMMERCIAL

## 2023-01-28 VITALS
BODY MASS INDEX: 25.77 KG/M2 | SYSTOLIC BLOOD PRESSURE: 135 MMHG | HEIGHT: 70 IN | RESPIRATION RATE: 16 BRPM | WEIGHT: 180 LBS | DIASTOLIC BLOOD PRESSURE: 87 MMHG | OXYGEN SATURATION: 98 % | HEART RATE: 97 BPM | TEMPERATURE: 98 F

## 2023-01-28 DIAGNOSIS — B02.9 HERPES ZOSTER WITHOUT COMPLICATION: Primary | ICD-10-CM

## 2023-01-28 DIAGNOSIS — B02.29 POSTHERPETIC NEURALGIA: ICD-10-CM

## 2023-01-28 PROCEDURE — 1159F PR MEDICATION LIST DOCUMENTED IN MEDICAL RECORD: ICD-10-PCS | Mod: CPTII,S$GLB,, | Performed by: PHYSICIAN ASSISTANT

## 2023-01-28 PROCEDURE — 1160F RVW MEDS BY RX/DR IN RCRD: CPT | Mod: CPTII,S$GLB,, | Performed by: PHYSICIAN ASSISTANT

## 2023-01-28 PROCEDURE — 96372 PR INJECTION,THERAP/PROPH/DIAG2ST, IM OR SUBCUT: ICD-10-PCS | Mod: S$GLB,,, | Performed by: PHYSICIAN ASSISTANT

## 2023-01-28 PROCEDURE — 1159F MED LIST DOCD IN RCRD: CPT | Mod: CPTII,S$GLB,, | Performed by: PHYSICIAN ASSISTANT

## 2023-01-28 PROCEDURE — 1160F PR REVIEW ALL MEDS BY PRESCRIBER/CLIN PHARMACIST DOCUMENTED: ICD-10-PCS | Mod: CPTII,S$GLB,, | Performed by: PHYSICIAN ASSISTANT

## 2023-01-28 PROCEDURE — 3079F DIAST BP 80-89 MM HG: CPT | Mod: CPTII,S$GLB,, | Performed by: PHYSICIAN ASSISTANT

## 2023-01-28 PROCEDURE — 3008F PR BODY MASS INDEX (BMI) DOCUMENTED: ICD-10-PCS | Mod: CPTII,S$GLB,, | Performed by: PHYSICIAN ASSISTANT

## 2023-01-28 PROCEDURE — 99214 PR OFFICE/OUTPT VISIT, EST, LEVL IV, 30-39 MIN: ICD-10-PCS | Mod: 25,S$GLB,, | Performed by: PHYSICIAN ASSISTANT

## 2023-01-28 PROCEDURE — 3075F SYST BP GE 130 - 139MM HG: CPT | Mod: CPTII,S$GLB,, | Performed by: PHYSICIAN ASSISTANT

## 2023-01-28 PROCEDURE — 99214 OFFICE O/P EST MOD 30 MIN: CPT | Mod: 25,S$GLB,, | Performed by: PHYSICIAN ASSISTANT

## 2023-01-28 PROCEDURE — 96372 THER/PROPH/DIAG INJ SC/IM: CPT | Mod: S$GLB,,, | Performed by: PHYSICIAN ASSISTANT

## 2023-01-28 PROCEDURE — 3008F BODY MASS INDEX DOCD: CPT | Mod: CPTII,S$GLB,, | Performed by: PHYSICIAN ASSISTANT

## 2023-01-28 PROCEDURE — 3075F PR MOST RECENT SYSTOLIC BLOOD PRESS GE 130-139MM HG: ICD-10-PCS | Mod: CPTII,S$GLB,, | Performed by: PHYSICIAN ASSISTANT

## 2023-01-28 PROCEDURE — 3079F PR MOST RECENT DIASTOLIC BLOOD PRESSURE 80-89 MM HG: ICD-10-PCS | Mod: CPTII,S$GLB,, | Performed by: PHYSICIAN ASSISTANT

## 2023-01-28 RX ORDER — KETOROLAC TROMETHAMINE 30 MG/ML
30 INJECTION, SOLUTION INTRAMUSCULAR; INTRAVENOUS
Status: COMPLETED | OUTPATIENT
Start: 2023-01-28 | End: 2023-01-28

## 2023-01-28 RX ORDER — IBUPROFEN 800 MG/1
800 TABLET ORAL EVERY 6 HOURS PRN
Qty: 28 TABLET | Refills: 0 | Status: SHIPPED | OUTPATIENT
Start: 2023-01-28 | End: 2023-02-04

## 2023-01-28 RX ORDER — LIDOCAINE 50 MG/G
3 PATCH TOPICAL DAILY
Qty: 20 PATCH | Refills: 0 | Status: SHIPPED | OUTPATIENT
Start: 2023-01-28 | End: 2023-02-22

## 2023-01-28 RX ORDER — GABAPENTIN 300 MG/1
300 CAPSULE ORAL 3 TIMES DAILY
Qty: 90 CAPSULE | Refills: 0 | Status: SHIPPED | OUTPATIENT
Start: 2023-01-28 | End: 2023-02-22

## 2023-01-28 RX ADMIN — KETOROLAC TROMETHAMINE 30 MG: 30 INJECTION, SOLUTION INTRAMUSCULAR; INTRAVENOUS at 05:01

## 2023-01-28 NOTE — PROGRESS NOTES
"Subjective:       Patient ID: Harish Busby is a 57 y.o. male.    Vitals:  height is 5' 10" (1.778 m) and weight is 81.6 kg (180 lb). His oral temperature is 97.6 °F (36.4 °C). His blood pressure is 135/87 and his pulse is 97. His respiration is 16 and oxygen saturation is 98%.     Chief Complaint: Rash (Shingles)    Patient presents with shingles. States they are on the right waist line front and back. States the pain is 8/10. Symptoms that's last Thursday. Seen here when symptoms started on 1/19 and given valtrex, toradol, steroids and norco. States he is done with the valtrex and norco. Finishing up the steroid rx. Rash is drying up but pain is severe     Rash  This is a new problem. The current episode started 1 to 4 weeks ago (1.5). The problem is unchanged. The affected locations include the groin and back. The rash is characterized by blistering, draining, burning, itchiness, redness, peeling, dryness and swelling. He was exposed to nothing. Pertinent negatives include no anorexia, congestion, cough, diarrhea, eye pain, facial edema, fatigue, fever, joint pain, nail changes, rhinorrhea, shortness of breath, sore throat or vomiting. The treatment provided no relief. His past medical history is significant for allergies. There is no history of asthma, eczema or varicella.     Constitution: Negative for fatigue and fever.   HENT:  Negative for congestion and sore throat.    Eyes:  Negative for eye pain.   Respiratory:  Negative for cough and shortness of breath.    Gastrointestinal:  Negative for vomiting and diarrhea.   Skin:  Positive for rash.     Objective:      Physical Exam   Constitutional: He is oriented to person, place, and time. He appears well-developed. He is cooperative.  Non-toxic appearance. He does not appear ill. No distress.   HENT:   Head: Normocephalic and atraumatic.   Ears:   Right Ear: Hearing, tympanic membrane, external ear and ear canal normal.   Left Ear: Hearing, tympanic " membrane, external ear and ear canal normal.   Nose: Nose normal. No mucosal edema, rhinorrhea or nasal deformity. No epistaxis. Right sinus exhibits no maxillary sinus tenderness and no frontal sinus tenderness. Left sinus exhibits no maxillary sinus tenderness and no frontal sinus tenderness.   Mouth/Throat: Uvula is midline, oropharynx is clear and moist and mucous membranes are normal. No trismus in the jaw. Normal dentition. No uvula swelling. No posterior oropharyngeal erythema.   Eyes: Conjunctivae and lids are normal. Right eye exhibits no discharge. Left eye exhibits no discharge. No scleral icterus.   Neck: Trachea normal and phonation normal. Neck supple.   Cardiovascular: Normal rate, regular rhythm, normal heart sounds and normal pulses.   Pulmonary/Chest: Effort normal and breath sounds normal. No respiratory distress.   Abdominal: Normal appearance and bowel sounds are normal. He exhibits no distension and no mass. Soft. There is no abdominal tenderness.   Musculoskeletal: Normal range of motion.         General: No deformity. Normal range of motion.   Neurological: He is alert and oriented to person, place, and time. He exhibits normal muscle tone. Coordination normal.   Skin: Skin is warm, dry, intact, not diaphoretic, not pale, rash and vesicular.        Psychiatric: His speech is normal and behavior is normal. Judgment and thought content normal.   Nursing note and vitals reviewed.      Assessment:       1. Herpes zoster without complication    2. Postherpetic neuralgia          Plan:       LA  review has had two narcotic rx within the past month. Explained we could write no refills here on the norco and that he should f/u with PCP if experiencing no relief with rx ibuprofen, gabapentin and lidoderm patches. He was given shot of IM toradol here.    Herpes zoster without complication  -     ketorolac injection 30 mg  -     gabapentin (NEURONTIN) 300 MG capsule; Take 1 capsule (300 mg total) by  mouth 3 (three) times daily.  Dispense: 90 capsule; Refill: 0  -     ibuprofen (ADVIL,MOTRIN) 800 MG tablet; Take 1 tablet (800 mg total) by mouth every 6 (six) hours as needed for Pain.  Dispense: 28 tablet; Refill: 0  -     LIDOcaine (LIDODERM) 5 %; Place 3 patches onto the skin once daily. Remove & Discard patch within 12 hours or as directed by MD  Dispense: 20 patch; Refill: 0    Postherpetic neuralgia  -     ketorolac injection 30 mg  -     gabapentin (NEURONTIN) 300 MG capsule; Take 1 capsule (300 mg total) by mouth 3 (three) times daily.  Dispense: 90 capsule; Refill: 0  -     ibuprofen (ADVIL,MOTRIN) 800 MG tablet; Take 1 tablet (800 mg total) by mouth every 6 (six) hours as needed for Pain.  Dispense: 28 tablet; Refill: 0  -     LIDOcaine (LIDODERM) 5 %; Place 3 patches onto the skin once daily. Remove & Discard patch within 12 hours or as directed by MD  Dispense: 20 patch; Refill: 0    Loree Tavarez PA-C  [facility]       Patient Instructions   You were given TORADOL injection here. You can start GABAPENTIN tonight. Start once daily and slowly increase up to 3x daily. You can start RX IBUPROFEN every 6 hours as needed starting tomorrow. LIDODERM PATCHES apply to area and keep on no longer than 12 hours. Follow up with your doctor if no improvement.

## 2023-01-28 NOTE — PATIENT INSTRUCTIONS
You were given TORADOL injection here. You can start GABAPENTIN tonight. Start once daily and slowly increase up to 3x daily. You can start RX IBUPROFEN every 6 hours as needed starting tomorrow. LIDODERM PATCHES apply to area and keep on no longer than 12 hours. Follow up with your doctor if no improvement.

## 2023-01-30 ENCOUNTER — PATIENT MESSAGE (OUTPATIENT)
Dept: ALLERGY | Facility: CLINIC | Age: 58
End: 2023-01-30
Payer: COMMERCIAL

## 2023-01-30 ENCOUNTER — PATIENT MESSAGE (OUTPATIENT)
Dept: PHARMACY | Facility: CLINIC | Age: 58
End: 2023-01-30
Payer: COMMERCIAL

## 2023-01-30 ENCOUNTER — SPECIALTY PHARMACY (OUTPATIENT)
Dept: PHARMACY | Facility: CLINIC | Age: 58
End: 2023-01-30
Payer: COMMERCIAL

## 2023-01-30 NOTE — TELEPHONE ENCOUNTER
Specialty Pharmacy - Refill Coordination    Specialty Medication Orders Linked to Encounter      Flowsheet Row Most Recent Value   Medication #1 evolocumab (REPATHA SURECLICK) 140 mg/mL PnIj (Order#[id number], Rx#[id number])            Refill Questions - Documented Responses      Flowsheet Row Most Recent Value   Refill Screening Questions    Would patient like to speak to a pharmacist? No   When does the patient need to receive the medication? 02/07/23   Refill Delivery Questions    How will the patient receive the medication? [facility]   When does the patient need to receive the medication? 02/07/23   Shipping Address Home   Address in [facility] confirmed and updated if neccessary? Yes   Expected Copay ($) 5   Is the patient able to afford the medication copay? Yes   Payment Method CC on file   Days supply of Refill 28   Supplies needed? No supplies needed   Refill activity completed? Yes   Refill activity plan Refill scheduled   Shipment/Pickup Date: 02/01/23            Current Outpatient Medications   Medication Sig    anastrozole (ARIMIDEX) 1 mg Tab Take 1 tablet (1 mg total) by mouth once daily.    aspirin (ECOTRIN) 81 MG EC tablet Take 1 tablet by mouth every morning.    EPINEPHrine (EPIPEN) 0.3 mg/0.3 mL AtIn Inject 0.3 mLs (0.3 mg total) into the muscle once. for 1 dose    evolocumab (REPATHA SURECLICK) 140 mg/mL PnIj Inject 1 mL (140 mg total) into the skin every 14 (fourteen) days.    ezetimibe (ZETIA) 10 mg tablet Take 1 tablet (10 mg total) by mouth once daily.    gabapentin (NEURONTIN) 300 MG capsule Take 1 capsule (300 mg total) by mouth 3 (three) times daily.    hyoscyamine (ANASPAZ,LEVSIN) 0.125 mg Tab Take 1 tablet (125 mcg total) by mouth 2 (two) times daily as needed.    ibuprofen (ADVIL,MOTRIN) 800 MG tablet Take 1 tablet (800 mg total) by mouth every 6 (six) hours as needed for Pain.    LIDOcaine (LIDODERM) 5 % Place 3 patches onto the skin once daily. Remove & Discard patch within 12 hours  or as directed by MD    metoprolol succinate (TOPROL-XL) 25 MG 24 hr tablet Take 2 tablets (50 mg total) by mouth once daily.    omalizumab (XOLAIR) 150 mg/mL injection Inject 2 mLs (300 mg total) into the skin every 28 days.    oxyCODONE-acetaminophen (PERCOCET) 5-325 mg per tablet Take 1 tablet by mouth every 4 (four) hours as needed for Pain. (Patient not taking: Reported on 1/19/2023)    pantoprazole (PROTONIX) 40 MG tablet Take 40 mg by mouth.    predniSONE (DELTASONE) 20 MG tablet     tamsulosin (FLOMAX) 0.4 mg Cap Take 1 capsule (0.4 mg total) by mouth once daily.    traZODone (DESYREL) 50 MG tablet 1 tablet nightly as needed.    valACYclovir (VALTREX) 1000 MG tablet Take 1 tablet (1,000 mg total) by mouth every 8 (eight) hours. for 7 days    zolpidem (AMBIEN) 10 mg Tab Take 1 tablet (10 mg total) by mouth nightly as needed (insomnia).   Last reviewed on 1/29/2023  9:25 AM by Loree Tavarez PA-C    Review of patient's allergies indicates:   Allergen Reactions    Milk Other (See Comments)    Wheat containing prod Hives, Rash, Shortness Of Breath and Swelling    Egg white     Statins-hmg-coa reductase inhibitors Other (See Comments)    Wheat     Adhesive Rash    Polyester fibers Rash    Last reviewed on  1/29/2023 9:25 AM by Loree Tavarez      Tasks added this encounter   2/28/2023 - Refill Call (Auto Added)   Tasks due within next 3 months   No tasks due.     Rico Poe, PharmD  [facility]  [address] [place] [id number]  Phone: [phone]  Fax: [phone]

## 2023-02-21 NOTE — PROGRESS NOTES
Subjective:      Harish Busby is a 57 y.o. male, here today with C/C of:  Herpes Zoster      HPI    Mr. Busby reports shingles infection on 1/19/2023.  Tx at  with Valtrex and jose.  He was not able to tolerate the jose ---> tachycardia.  Did complete the Valtrex as ordered, rash has cleared.  Continues with burning and stabbing pain to area of rash.      Review of Systems   Constitutional:  Negative for chills and fever.   Skin:  Positive for rash (with neuralgia, rash resolved).   Neurological:  Negative for tremors, syncope, weakness, light-headedness and headaches.       Review of patient's allergies indicates:   Allergen Reactions    Milk Other (See Comments)    Wheat containing prod Hives, Rash, Shortness Of Breath and Swelling    Egg white     Statins-hmg-coa reductase inhibitors Other (See Comments)    Wheat     Adhesive Rash    Polyester fibers Rash       Patient Active Problem List   Diagnosis    SVT (supraventricular tachycardia)    Polycythemia    NCGS (non-celiac gluten sensitivity)    Statin intolerance    Primary hypertension    Dyslipidemia    Kidney cysts    Kidney stones    Benign prostatic hyperplasia without lower urinary tract symptoms    Aortic ectasia    Constipation    Preop cardiovascular exam    Infrarenal abdominal aortic aneurysm (AAA) without rupture    PVD (peripheral vascular disease)    Aortic atherosclerosis         Current Outpatient Medications:     anastrozole (ARIMIDEX) 1 mg Tab, Take 1 tablet (1 mg total) by mouth once daily., Disp: 90 tablet, Rfl: 3    aspirin (ECOTRIN) 81 MG EC tablet, Take 1 tablet by mouth every morning., Disp: , Rfl:     EPINEPHrine (EPIPEN) 0.3 mg/0.3 mL AtIn, Inject 0.3 mLs (0.3 mg total) into the muscle once. for 1 dose, Disp: 2 each, Rfl: 1    evolocumab (REPATHA SURECLICK) 140 mg/mL PnIj, Inject 1 mL (140 mg total) into the skin every 14 (fourteen) days., Disp: 6 mL, Rfl: 4    ezetimibe (ZETIA) 10 mg tablet, Take 1 tablet (10 mg total) by  "mouth once daily., Disp: 90 tablet, Rfl: 3    gabapentin (NEURONTIN) 300 MG capsule, Take 1 capsule (300 mg total) by mouth 3 (three) times daily., Disp: 90 capsule, Rfl: 0    hyoscyamine (ANASPAZ,LEVSIN) 0.125 mg Tab, Take 1 tablet (125 mcg total) by mouth 2 (two) times daily as needed., Disp: 60 tablet, Rfl: 0    LIDOcaine (LIDODERM) 5 %, Place 3 patches onto the skin once daily. Remove & Discard patch within 12 hours or as directed by MD, Disp: 20 patch, Rfl: 0    metoprolol succinate (TOPROL-XL) 25 MG 24 hr tablet, Take 2 tablets (50 mg total) by mouth once daily., Disp: 180 tablet, Rfl: 3    omalizumab (XOLAIR) 150 mg/mL injection, Inject 2 mLs (300 mg total) into the skin every 28 days., Disp: 2 each, Rfl: 11    oxyCODONE-acetaminophen (PERCOCET) 5-325 mg per tablet, Take 1 tablet by mouth every 4 (four) hours as needed for Pain. (Patient not taking: Reported on 1/19/2023), Disp: 10 tablet, Rfl: 0    pantoprazole (PROTONIX) 40 MG tablet, Take 40 mg by mouth., Disp: , Rfl:     predniSONE (DELTASONE) 20 MG tablet, , Disp: , Rfl:     tamsulosin (FLOMAX) 0.4 mg Cap, Take 1 capsule (0.4 mg total) by mouth once daily., Disp: 90 capsule, Rfl: 3    traZODone (DESYREL) 50 MG tablet, 1 tablet nightly as needed., Disp: , Rfl:     valACYclovir (VALTREX) 1000 MG tablet, Take 1 tablet (1,000 mg total) by mouth every 8 (eight) hours. for 7 days, Disp: 21 tablet, Rfl: 0    zolpidem (AMBIEN) 10 mg Tab, Take 1 tablet (10 mg total) by mouth nightly as needed (insomnia)., Disp: 30 tablet, Rfl: 2    Current Facility-Administered Medications:     omalizumab injection 300 mg, 300 mg, Subcutaneous, Q28 Days, Carolyn Ortiz MD, 300 mg at 01/10/23 1414      Past medical, surgical, family and social histories have been reviewed today.      Objective:     Vitals:    02/22/23 1358   BP: 105/78   Pulse: 98   Temp: 97.3 °F (36.3 °C)   SpO2: 99%   Weight: 83.7 kg (184 lb 8.4 oz)   Height: 5' 10" (1.778 m)   PainSc:   8   PainLoc: Groin "       Physical Exam  Vitals reviewed.   Constitutional:       General: He is not in acute distress.  Skin:            Comments: Area of zoster rash, cleared.  Now with PHN, uncontrolled.   Neurological:      Mental Status: He is alert and oriented to person, place, and time.       Diagnosis/Assessment:     1. PHN (postherpetic neuralgia)  - HYDROcodone-acetaminophen (NORCO) 7.5-325 mg per tablet; Take 1 tablet by mouth every 6 (six) hours as needed for Pain.  Dispense: 20 tablet; Refill: 0    2. Medication refill  - predniSONE (DELTASONE) 20 MG tablet; Take 1 tablet (20 mg total) by mouth daily as needed (food allergies).  Dispense: 90 tablet; Refill: 0  - pantoprazole (PROTONIX) 40 MG tablet; Take 1 tablet (40 mg total) by mouth daily as needed (gerd).  Dispense: 90 tablet; Refill: 0       Plan:     Norco for now, may consider nerve pain medication if pain continues.  Was not able to tolerate gabapentin.  Monitor.    Follow-up:     Contact office back if pain worsens or persists.  RTC as directed or on prn basis.        AYAN Vann  [facility]       30 minutes of total time spent on the encounter, which includes face to face time and non-face to face time preparing to see the patient.  This included obtaining and/or reviewing separately obtained history, and documenting clinical information in the electronic or other health record.   Also includes independent interpretation of results (not separately reported) and communicating results to the patient/family/caregiver, with care coordination (not separately reported).

## 2023-02-22 ENCOUNTER — OFFICE VISIT (OUTPATIENT)
Dept: FAMILY MEDICINE | Facility: CLINIC | Age: 58
End: 2023-02-22
Payer: COMMERCIAL

## 2023-02-22 VITALS
OXYGEN SATURATION: 99 % | TEMPERATURE: 97 F | HEIGHT: 70 IN | HEART RATE: 98 BPM | SYSTOLIC BLOOD PRESSURE: 105 MMHG | BODY MASS INDEX: 26.41 KG/M2 | WEIGHT: 184.5 LBS | DIASTOLIC BLOOD PRESSURE: 78 MMHG

## 2023-02-22 DIAGNOSIS — Z76.0 MEDICATION REFILL: ICD-10-CM

## 2023-02-22 DIAGNOSIS — B02.29 PHN (POSTHERPETIC NEURALGIA): Primary | ICD-10-CM

## 2023-02-22 PROBLEM — Z01.810 PREOP CARDIOVASCULAR EXAM: Status: RESOLVED | Noted: 2022-11-30 | Resolved: 2023-02-22

## 2023-02-22 PROCEDURE — 3008F BODY MASS INDEX DOCD: CPT | Mod: CPTII,S$GLB,, | Performed by: REGISTERED NURSE

## 2023-02-22 PROCEDURE — 99214 PR OFFICE/OUTPT VISIT, EST, LEVL IV, 30-39 MIN: ICD-10-PCS | Mod: S$GLB,,, | Performed by: REGISTERED NURSE

## 2023-02-22 PROCEDURE — 3078F PR MOST RECENT DIASTOLIC BLOOD PRESSURE < 80 MM HG: ICD-10-PCS | Mod: CPTII,S$GLB,, | Performed by: REGISTERED NURSE

## 2023-02-22 PROCEDURE — 99999 PR PBB SHADOW E&M-EST. PATIENT-LVL IV: ICD-10-PCS | Mod: PBBFAC,,, | Performed by: REGISTERED NURSE

## 2023-02-22 PROCEDURE — 3074F SYST BP LT 130 MM HG: CPT | Mod: CPTII,S$GLB,, | Performed by: REGISTERED NURSE

## 2023-02-22 PROCEDURE — 99214 OFFICE O/P EST MOD 30 MIN: CPT | Mod: S$GLB,,, | Performed by: REGISTERED NURSE

## 2023-02-22 PROCEDURE — 3074F PR MOST RECENT SYSTOLIC BLOOD PRESSURE < 130 MM HG: ICD-10-PCS | Mod: CPTII,S$GLB,, | Performed by: REGISTERED NURSE

## 2023-02-22 PROCEDURE — 3078F DIAST BP <80 MM HG: CPT | Mod: CPTII,S$GLB,, | Performed by: REGISTERED NURSE

## 2023-02-22 PROCEDURE — 99999 PR PBB SHADOW E&M-EST. PATIENT-LVL IV: CPT | Mod: PBBFAC,,, | Performed by: REGISTERED NURSE

## 2023-02-22 PROCEDURE — 3008F PR BODY MASS INDEX (BMI) DOCUMENTED: ICD-10-PCS | Mod: CPTII,S$GLB,, | Performed by: REGISTERED NURSE

## 2023-02-22 RX ORDER — PANTOPRAZOLE SODIUM 40 MG/1
40 TABLET, DELAYED RELEASE ORAL DAILY PRN
Qty: 90 TABLET | Refills: 0 | Status: SHIPPED | OUTPATIENT
Start: 2023-02-22 | End: 2023-05-18

## 2023-02-22 RX ORDER — PREDNISONE 20 MG/1
20 TABLET ORAL DAILY PRN
Qty: 90 TABLET | Refills: 0 | Status: SHIPPED | OUTPATIENT
Start: 2023-02-22 | End: 2023-05-19 | Stop reason: SDUPTHER

## 2023-02-22 RX ORDER — NORTRIPTYLINE HYDROCHLORIDE 10 MG/1
10 CAPSULE ORAL NIGHTLY
Qty: 90 CAPSULE | Refills: 1 | Status: SHIPPED | OUTPATIENT
Start: 2023-02-22 | End: 2023-02-28

## 2023-02-22 RX ORDER — DICYCLOMINE HYDROCHLORIDE 20 MG/1
20 TABLET ORAL 3 TIMES DAILY
COMMUNITY
Start: 2023-02-21 | End: 2023-02-28

## 2023-02-22 RX ORDER — HYDROCODONE BITARTRATE AND ACETAMINOPHEN 7.5; 325 MG/1; MG/1
1 TABLET ORAL EVERY 6 HOURS PRN
Qty: 20 TABLET | Refills: 0 | Status: SHIPPED | OUTPATIENT
Start: 2023-02-22 | End: 2023-04-12

## 2023-02-28 ENCOUNTER — HOSPITAL ENCOUNTER (EMERGENCY)
Facility: HOSPITAL | Age: 58
Discharge: HOME | End: 2023-02-28
Attending: EMERGENCY MEDICINE
Payer: COMMERCIAL

## 2023-02-28 VITALS
OXYGEN SATURATION: 97 % | RESPIRATION RATE: 22 BRPM | SYSTOLIC BLOOD PRESSURE: 133 MMHG | TEMPERATURE: 99 F | HEART RATE: 97 BPM | DIASTOLIC BLOOD PRESSURE: 90 MMHG

## 2023-02-28 DIAGNOSIS — R10.13 EPIGASTRIC PAIN: Primary | ICD-10-CM

## 2023-02-28 DIAGNOSIS — R07.9 CHEST PAIN: ICD-10-CM

## 2023-02-28 DIAGNOSIS — I71.02 DISSECTION OF ABDOMINAL AORTA: ICD-10-CM

## 2023-02-28 LAB
ALBUMIN SERPL BCP-MCNC: 3.4 G/DL (ref 3.5–5.2)
ALP SERPL-CCNC: 53 U/L (ref 55–135)
ALT SERPL W/O P-5'-P-CCNC: 43 U/L (ref 10–44)
ANION GAP SERPL CALC-SCNC: 12 MMOL/L (ref 8–16)
AST SERPL-CCNC: 20 U/L (ref 10–40)
BASOPHILS # BLD AUTO: 0.04 K/UL (ref 0–0.2)
BASOPHILS NFR BLD: 0.4 % (ref 0–1.9)
BILIRUB SERPL-MCNC: 0.5 MG/DL (ref 0.1–1)
BNP SERPL-MCNC: 37 PG/ML (ref 0–99)
BUN SERPL-MCNC: 18 MG/DL (ref 6–20)
CALCIUM SERPL-MCNC: 8.9 MG/DL (ref 8.7–10.5)
CHLORIDE SERPL-SCNC: 102 MMOL/L (ref 95–110)
CK SERPL-CCNC: 23 U/L (ref 20–200)
CO2 SERPL-SCNC: 24 MMOL/L (ref 23–29)
CREAT SERPL-MCNC: 1.2 MG/DL (ref 0.5–1.4)
DIFFERENTIAL METHOD: ABNORMAL
EOSINOPHIL # BLD AUTO: 0 K/UL (ref 0–0.5)
EOSINOPHIL NFR BLD: 0.3 % (ref 0–8)
ERYTHROCYTE [DISTWIDTH] IN BLOOD BY AUTOMATED COUNT: 15.6 % (ref 11.5–14.5)
EST. GFR  (NO RACE VARIABLE): >60 ML/MIN/1.73 M^2
GLUCOSE SERPL-MCNC: 100 MG/DL (ref 70–110)
HCT VFR BLD AUTO: 43.5 % (ref 40–54)
HCV AB SERPL QL IA: NEGATIVE
HGB BLD-MCNC: 14.1 G/DL (ref 14–18)
HIV 1+2 AB+HIV1 P24 AG SERPL QL IA: NEGATIVE
IMM GRANULOCYTES # BLD AUTO: 0.23 K/UL (ref 0–0.04)
IMM GRANULOCYTES NFR BLD AUTO: 2.4 % (ref 0–0.5)
LIPASE SERPL-CCNC: 35 U/L (ref 4–60)
LYMPHOCYTES # BLD AUTO: 1.1 K/UL (ref 1–4.8)
LYMPHOCYTES NFR BLD: 11.3 % (ref 18–48)
MCH RBC QN AUTO: 28.2 PG (ref 27–31)
MCHC RBC AUTO-ENTMCNC: 32.4 G/DL (ref 32–36)
MCV RBC AUTO: 87 FL (ref 82–98)
MONOCYTES # BLD AUTO: 0.7 K/UL (ref 0.3–1)
MONOCYTES NFR BLD: 7.1 % (ref 4–15)
NEUTROPHILS # BLD AUTO: 7.6 K/UL (ref 1.8–7.7)
NEUTROPHILS NFR BLD: 78.5 % (ref 38–73)
NRBC BLD-RTO: 0 /100 WBC
PLATELET # BLD AUTO: 225 K/UL (ref 150–450)
PMV BLD AUTO: 8.7 FL (ref 9.2–12.9)
POTASSIUM SERPL-SCNC: 3.6 MMOL/L (ref 3.5–5.1)
PROT SERPL-MCNC: 6.2 G/DL (ref 6–8.4)
RBC # BLD AUTO: 5 M/UL (ref 4.6–6.2)
SODIUM SERPL-SCNC: 138 MMOL/L (ref 136–145)
TROPONIN I SERPL DL<=0.01 NG/ML-MCNC: <0.006 NG/ML (ref 0–0.03)
WBC # BLD AUTO: 9.63 K/UL (ref 3.9–12.7)

## 2023-02-28 PROCEDURE — 93010 EKG 12-LEAD: ICD-10-PCS | Mod: ,,, | Performed by: INTERNAL MEDICINE

## 2023-02-28 PROCEDURE — 80053 COMPREHEN METABOLIC PANEL: CPT | Performed by: EMERGENCY MEDICINE

## 2023-02-28 PROCEDURE — 82550 ASSAY OF CK (CPK): CPT | Performed by: EMERGENCY MEDICINE

## 2023-02-28 PROCEDURE — 93010 ELECTROCARDIOGRAM REPORT: CPT | Mod: ,,, | Performed by: INTERNAL MEDICINE

## 2023-02-28 PROCEDURE — 96360 HYDRATION IV INFUSION INIT: CPT | Mod: 59

## 2023-02-28 PROCEDURE — 86803 HEPATITIS C AB TEST: CPT | Performed by: EMERGENCY MEDICINE

## 2023-02-28 PROCEDURE — 84484 ASSAY OF TROPONIN QUANT: CPT | Performed by: EMERGENCY MEDICINE

## 2023-02-28 PROCEDURE — 83880 ASSAY OF NATRIURETIC PEPTIDE: CPT | Performed by: EMERGENCY MEDICINE

## 2023-02-28 PROCEDURE — 87389 HIV-1 AG W/HIV-1&-2 AB AG IA: CPT | Performed by: EMERGENCY MEDICINE

## 2023-02-28 PROCEDURE — 25000003 PHARM REV CODE 250: Performed by: EMERGENCY MEDICINE

## 2023-02-28 PROCEDURE — 93005 ELECTROCARDIOGRAM TRACING: CPT

## 2023-02-28 PROCEDURE — 83690 ASSAY OF LIPASE: CPT | Performed by: EMERGENCY MEDICINE

## 2023-02-28 PROCEDURE — 25500020 PHARM REV CODE 255: Performed by: EMERGENCY MEDICINE

## 2023-02-28 PROCEDURE — 99285 EMERGENCY DEPT VISIT HI MDM: CPT | Mod: 25

## 2023-02-28 PROCEDURE — 85025 COMPLETE CBC W/AUTO DIFF WBC: CPT | Performed by: EMERGENCY MEDICINE

## 2023-02-28 RX ORDER — GEMFIBROZIL 600 MG/1
600 TABLET, FILM COATED ORAL
COMMUNITY
End: 2023-03-20

## 2023-02-28 RX ORDER — MULTIVITAMIN
1 TABLET ORAL DAILY
COMMUNITY
End: 2023-08-11

## 2023-02-28 RX ADMIN — IOHEXOL 100 ML: 350 INJECTION, SOLUTION INTRAVENOUS at 12:02

## 2023-02-28 RX ADMIN — SODIUM CHLORIDE 1000 ML: 9 INJECTION, SOLUTION INTRAVENOUS at 01:02

## 2023-02-28 NOTE — PHARMACY MED REC
"Admission Medication History     The home medication history was taken by Nilson Hodges.    You may go to "Admission" then "Reconcile Home Medications" tabs to review and/or act upon these items.     The home medication list has been updated by the Pharmacy department.   Please read ALL comments highlighted in yellow.   Please address this information as you see fit.    Feel free to contact us if you have any questions or require assistance.      The medications listed below were removed from the home medication list. Please reorder if appropriate:  Patient reports no longer taking the following medication(s):  DICYCLOMINE 20 MG TABLET  NORTRIPTYLINE 10 MG CAPSULE  OMALIZUMAB (XOLAIR) 150 MG/mL INJECTION  TAMSULOSIN 0.4 MG CAPSULE  TRAZODONE 50 MG TABLET  GABAPENTIN 300 MG CAPSULE    Medications listed below were obtained from: Patient/family, [employer] [place], and Patient's pharmacy  (Not in a hospital admission)      Potential issues to be addressed PRIOR TO DISCHARGE: NONE    Nilson Hodges, Linette-Adv  Pharmacy Technician Specialist-Medication History  [facility] [phone]  Secure chat preferred     Current Outpatient Medications on File Prior to Encounter   Medication Sig Dispense Refill Last Dose    anastrozole (ARIMIDEX) 1 mg Tab Take 1 tablet (1 mg total) by mouth once daily. 90 tablet 3 2/27/2023    evolocumab (REPATHA SURECLICK) 140 mg/mL PnIj Inject 1 mL (140 mg total) into the skin every 14 (fourteen) days. 6 mL 4 Past Week    ezetimibe (ZETIA) 10 mg tablet Take 1 tablet (10 mg total) by mouth once daily. 90 tablet 3 2/28/2023    gemfibroziL (LOPID) 600 MG tablet Take 600 mg by mouth 2 (two) times daily before meals.   2/27/2023    HYDROcodone-acetaminophen (NORCO) 7.5-325 mg per tablet Take 1 tablet by mouth every 6 (six) hours as needed for Pain. 20 tablet 0 2/27/2023    metoprolol succinate (TOPROL-XL) 25 MG 24 hr tablet Take 2 tablets (50 mg total) by mouth once daily. 180 tablet 3 " 2/28/2023    multivitamin (ONE DAILY MULTIVITAMIN) per tablet Take 1 tablet by mouth once daily.   2/28/2023    pantoprazole (PROTONIX) 40 MG tablet Take 1 tablet (40 mg total) by mouth daily as needed (gerd). 90 tablet 0 2/28/2023    predniSONE (DELTASONE) 20 MG tablet Take 1 tablet (20 mg total) by mouth daily as needed (food allergies). 90 tablet 0 2/28/2023    zolpidem (AMBIEN) 10 mg Tab Take 1 tablet (10 mg total) by mouth nightly as needed (insomnia). 30 tablet 2 2/27/2023    [DISCONTINUED] dicyclomine (BENTYL) 20 mg tablet Take 20 mg by mouth 3 (three) times daily.       [DISCONTINUED] nortriptyline (PAMELOR) 10 MG capsule Take 1 capsule (10 mg total) by mouth every evening. 90 capsule 1     [DISCONTINUED] omalizumab (XOLAIR) 150 mg/mL injection Inject 2 mLs (300 mg total) into the skin every 28 days. 2 each 11     [DISCONTINUED] tamsulosin (FLOMAX) 0.4 mg Cap Take 1 capsule (0.4 mg total) by mouth once daily. 90 capsule 3     [DISCONTINUED] traZODone (DESYREL) 50 MG tablet 1 tablet nightly as needed.                              .

## 2023-02-28 NOTE — ED PROVIDER NOTES
SCRIBE #1 NOTE: I, Marilyn Layne, am scribing for, and in the presence of, Chaim Ward MD. I have scribed the entire note.      History      Chief Complaint   Patient presents with    Abdominal Pain     Sudden onset of severe, tearing abdominal pain in epigastric region. Recently dx with AAA. Sudden onset of shortness of breath during occurrence.  States he feels some relief now but is still tachycardic, tachypneic, and hypertensive.        Review of patient's allergies indicates:   Allergen Reactions    Milk Other (See Comments)    Wheat containing prod Hives, Rash, Shortness Of Breath and Swelling    Egg white     Statins-hmg-coa reductase inhibitors Other (See Comments)    Adhesive Rash    Polyester fibers Rash        HPI   HPI    2/28/2023, 11:36 AM   History obtained from the patient      History of Present Illness: Harish Busby is a 57 y.o. male patient with a PMHx of AAA, HLD, HTN, and SVT who presents to the Emergency Department for epigastric pain which onset suddenly PTA. He describes the pain as a tearing pain. Pt reports that he was recently dx with an AAA, but has not undergone any intervention for it yet. Symptoms are constant and moderate in severity. No mitigating or exacerbating factors reported. Associated sxs include SOB and N/V. Patient denies any fever, chills, weakness, numbness, CP, and all other sxs at this time. No prior Tx reported. No further complaints or concerns at this time.         Arrival mode: EMS    PCP: Kaila West MD       Past Medical History:  Past Medical History:   Diagnosis Date    Hyperlipidemia     Hypertension     NCGS (non-celiac gluten sensitivity)     Polycythemia     Preop cardiovascular exam 11/30/2022    Statin intolerance     Supraventricular tachycardia     SVT (supraventricular tachycardia)        Past Surgical History:  Past Surgical History:   Procedure Laterality Date    DENTAL SURGERY      dental implant    FINGER SURGERY           Family  History:  Family History   Problem Relation Age of Onset    Hypertension Mother     Cancer Father         cancer - leukemia    Cancer Brother         lung       Social History:  Social History     Tobacco Use    Smoking status: Former     Packs/day: 1.00     Years: 5.00     Pack years: 5.00     Types: Cigarettes    Smokeless tobacco: Never    Tobacco comments:     Quit 19 yo   Substance and Sexual Activity    Alcohol use: Not Currently    Drug use: Not Currently    Sexual activity: Not Currently       ROS   Review of Systems   Constitutional:  Negative for chills and fever.   HENT:  Negative for sore throat.    Respiratory:  Positive for shortness of breath.    Cardiovascular:  Negative for chest pain.   Gastrointestinal:  Positive for abdominal pain (epigastric), nausea and vomiting.   Genitourinary:  Negative for dysuria.   Musculoskeletal:  Negative for back pain.   Skin:  Negative for rash.   Neurological:  Negative for weakness and numbness.   Hematological:  Does not bruise/bleed easily.   All other systems reviewed and are negative.    Physical Exam      Initial Vitals [02/28/23 1130]   BP Pulse Resp Temp SpO2   (!) 141/109 (!) 132 (!) 25 98.8 °F (37.1 °C) 100 %      MAP       --          Physical Exam  Nursing Notes and Vital Signs Reviewed.  Constitutional: Patient is in mild distress. Well-developed and well-nourished.  Head: Atraumatic. Normocephalic.  Eyes: PERRL. EOM intact. Conjunctivae are not pale. No scleral icterus.  ENT: Mucous membranes are moist. Oropharynx is clear and symmetric.    Neck: Supple. Full ROM. No lymphadenopathy.  Cardiovascular: Tachycardic rate. Regular rhythm. No murmurs, rubs, or gallops. Distal pulses are 2+ and symmetric.  Pulmonary/Chest: Tachypneic. No respiratory distress. Clear to auscultation bilaterally. No wheezing or rales.  Abdominal: Soft and non-distended.  There is no tenderness.  No rebound, guarding, or rigidity. Good bowel sounds.  Musculoskeletal: Moves all  extremities. No obvious deformities. No edema.  Skin: Warm and dry.  Neurological:  Alert, awake, and appropriate.  Normal speech.  No acute focal neurological deficits are appreciated.  Psychiatric: Normal affect. Good eye contact. Appropriate in content.    ED Course    Procedures  ED Vital Signs:  Vitals:    02/28/23 1130 02/28/23 1140 02/28/23 1145 02/28/23 1200   BP: (!) 141/109 (!) 153/91 (!) 135/93 123/89   Pulse: (!) 132 (!) 122 (!) 119 (!) 114   Resp: (!) 25 16  15   Temp: 98.8 °F (37.1 °C)      TempSrc: Oral      SpO2: 100% 95% 97% 96%    02/28/23 1220 02/28/23 1300 02/28/23 1333   BP: (!) 145/88 (!) 136/95 (!) 145/92   Pulse: (!) 113 104 104   Resp: 19 19 (!) 22   Temp:      TempSrc:      SpO2: 95% 97% 95%       Abnormal Lab Results:  Labs Reviewed   CBC W/ AUTO DIFFERENTIAL - Abnormal; Notable for the following components:       Result Value    RDW 15.6 (*)     MPV 8.7 (*)     Immature Granulocytes 2.4 (*)     Immature Grans (Abs) 0.23 (*)     Gran % 78.5 (*)     Lymph % 11.3 (*)     All other components within normal limits    Narrative:     Release to patient->Immediate   COMPREHENSIVE METABOLIC PANEL - Abnormal; Notable for the following components:    Albumin 3.4 (*)     Alkaline Phosphatase 53 (*)     All other components within normal limits    Narrative:     Release to patient->Immediate   HIV 1 / 2 ANTIBODY    Narrative:     Release to patient->Immediate   HEPATITIS C ANTIBODY    Narrative:     Release to patient->Immediate   B-TYPE NATRIURETIC PEPTIDE    Narrative:     Release to patient->Immediate   CK    Narrative:     Release to patient->Immediate   TROPONIN I    Narrative:     Release to patient->Immediate   LIPASE   LIPASE    Narrative:     Release to patient->Immediate   HEP C VIRUS HOLD SPECIMEN   URINALYSIS, REFLEX TO URINE CULTURE        All Lab Results:  Results for orders placed or performed during the hospital encounter of 02/28/23   HIV 1/2 Ag/Ab (4th Gen)   Result Value Ref Range     HIV 1/2 Ag/Ab Negative Negative   Hepatitis C Antibody   Result Value Ref Range    Hepatitis C Ab Negative Negative   CBC Auto Differential   Result Value Ref Range    WBC 9.63 3.90 - 12.70 K/uL    RBC 5.00 4.60 - 6.20 M/uL    Hemoglobin 14.1 14.0 - 18.0 g/dL    Hematocrit 43.5 40.0 - 54.0 %    MCV 87 82 - 98 fL    MCH 28.2 27.0 - 31.0 pg    MCHC 32.4 32.0 - 36.0 g/dL    RDW 15.6 (H) 11.5 - 14.5 %    Platelets 225 150 - 450 K/uL    MPV 8.7 (L) 9.2 - 12.9 fL    Immature Granulocytes 2.4 (H) 0.0 - 0.5 %    Gran # (ANC) 7.6 1.8 - 7.7 K/uL    Immature Grans (Abs) 0.23 (H) 0.00 - 0.04 K/uL    Lymph # 1.1 1.0 - 4.8 K/uL    Mono # 0.7 0.3 - 1.0 K/uL    Eos # 0.0 0.0 - 0.5 K/uL    Baso # 0.04 0.00 - 0.20 K/uL    nRBC 0 0 /100 WBC    Gran % 78.5 (H) 38.0 - 73.0 %    Lymph % 11.3 (L) 18.0 - 48.0 %    Mono % 7.1 4.0 - 15.0 %    Eosinophil % 0.3 0.0 - 8.0 %    Basophil % 0.4 0.0 - 1.9 %    Differential Method Automated    Comprehensive Metabolic Panel   Result Value Ref Range    Sodium 138 136 - 145 mmol/L    Potassium 3.6 3.5 - 5.1 mmol/L    Chloride 102 95 - 110 mmol/L    CO2 24 23 - 29 mmol/L    Glucose 100 70 - 110 mg/dL    BUN 18 6 - 20 mg/dL    Creatinine 1.2 0.5 - 1.4 mg/dL    Calcium 8.9 8.7 - 10.5 mg/dL    Total Protein 6.2 6.0 - 8.4 g/dL    Albumin 3.4 (L) 3.5 - 5.2 g/dL    Total Bilirubin 0.5 0.1 - 1.0 mg/dL    Alkaline Phosphatase 53 (L) 55 - 135 U/L    AST 20 10 - 40 U/L    ALT 43 10 - 44 U/L    Anion Gap 12 8 - 16 mmol/L    eGFR >60 >60 mL/min/1.73 m^2   BNP   Result Value Ref Range    BNP 37 0 - 99 pg/mL   CK   Result Value Ref Range    CPK 23 20 - 200 U/L   Troponin I   Result Value Ref Range    Troponin I <0.006 0.000 - 0.026 ng/mL   Lipase   Result Value Ref Range    Lipase 35 4 - 60 U/L         Imaging Results:  Imaging Results              CTA Chest Abdomen Pelvis (Final result)  Result time 02/28/23 13:22:55      Final result by BRUCE Patterson Sr., MD (02/28/23 13:22:55)                   Impression:       1. There is a dissection in the distal aspect of the abdominal aorta distal to the takeoff of the inferior mesenteric artery. There is no aneurysm.  2. There is a moderate amount of inflammatory changes on the left side of the abdomen adjacent to the posterior aspect of the stomach, pancreas, spleen, and descending colon.  This may be secondary to gastritis.  3. There is a subtle amount of haziness scattered throughout both lungs.  An infectious process cannot be excluded.  4. There are nonobstructive stones in both kidneys. One of the larger ones measures 3 mm and is located in the inferior pole of the right kidney.  5. There are hypodense masses in both kidneys. One of the larger ones is a 24 mm exophytic hypodense mass off of the superior pole of the right kidney. On the precontrast examination is mass has a Hounsfield measurement of 4.  This is characteristic of a cyst.  6. The above findings and impressions were discussed with Dr. Ward via the telephone at 13:15 on 02/28/2023.  I observed these findings at 13:12 on 02/28/2023.  All CT scans at this facility use dose modulation, iterative reconstruction, and/or weight base dosing when appropriate to reduce radiation dose when appropriate to reduce radiation dose to as low as reasonably achievable.      Electronically signed by: Solis Patterson MD  Date:    02/28/2023  Time:    13:22               Narrative:    EXAMINATION:  CTA CHEST ABDOMEN PELVIS    CLINICAL HISTORY:  Aortic aneurysm, known or suspected;    TECHNIQUE:  Standard chest, abdomen, and pelvis CT protocol without and with IV contrast and 3D MIP reformats was performed.  100 mL of Omnipaque 350 contrast material was used for this examination.  There was no oral contrast administered.    COMPARISON:  Comparison was made to CT examination of the abdomen and pelvis performed on 10/31/2022.    FINDINGS:  Finding: There is a mild amount of atherosclerosis.  There is a dissection in the distal  aspect of the abdominal aorta distal to the takeoff of the inferior mesenteric artery.  There is no aneurysm.    The size of the heart is within normal limits.  There is a subtle amount of haziness scattered throughout both lungs.  There are minimal dependent atelectatic changes in both lungs.  There is no pneumothorax or pleural effusion.    There are moderate inflammatory changes adjacent to the posterior aspect of the stomach.  The inflammatory changes extend throughout the left side of the abdomen down to the level of the midpole of the left kidney.  The liver, gallbladder, pancreas, spleen, and adrenals are normal in appearance.  There are nonobstructive stones in both kidneys.  One of the larger ones measures 3 mm and is located in the inferior pole of the right kidney.  There are hypodense masses in both kidneys.  One of the larger ones is a 24 mm exophytic hypodense mass off of the superior pole of the right kidney.  On the precontrast examination is mass has a Hounsfield measurement of 4.  There is no hydronephrosis.  The ureters and the urinary bladder are normal in appearance.  There is a mild amount of calcification in the prostate.  The appendix and the rest of the gastrointestinal system are normal in appearance. There is no pneumoperitoneum.                                       X-Ray Chest AP Portable (Final result)  Result time 02/28/23 12:14:33      Final result by BRUCE Patterson Sr., MD (02/28/23 12:14:33)                   Impression:      The size of the heart is prominent. There is a mild amount of interstitial and alveolar opacities seen in both lungs.  This is characteristic of pulmonary edema.      Electronically signed by: Solis Patterson MD  Date:    02/28/2023  Time:    12:14               Narrative:    EXAMINATION:  XR CHEST AP PORTABLE    CLINICAL HISTORY:  chest pain;    COMPARISON:  None    FINDINGS:  The size of the heart is prominent.  There is a mild amount of interstitial and  alveolar opacities seen in both lungs.  There is no pneumothorax.  The costophrenic angles are sharp.                                     The EKG was ordered, reviewed, and independently interpreted by the ED provider.  Interpretation time: 11:43  Rate: 119 BPM  Rhythm: sinus tachycardia  Interpretation: No acute ST changes. No STEMI.             The Emergency Provider reviewed the vital signs and test results, which are outlined above.    ED Discussion     2:25 PM: Discussed pt's case with Dr. Akhtar (Vascular Surgery) who states that there is no need for surgical intervention and that the pt is safe for tele.    2:38 PM: Discussed case with Dr. Barbosa ([facility]). Dr. Barbosa agrees with current care and management of pt and accepts admission.   Admitting Service: [facility]  Admitting Physician: Dr. Barbosa  Admit to: Obs    2:39 PM: Re-evaluated pt. I have discussed test results, shared treatment plan, and the need for admission with patient and family at bedside. Pt and family express understanding at this time and agree with all information. All questions answered. Pt and family have no further questions or concerns at this time. Pt is ready for admit.           ED Medication(s):  Medications   iohexoL (OMNIPAQUE 350) injection 100 mL (100 mLs Intravenous Given 2/28/23 1229)   sodium chloride 0.9% bolus 1,000 mL 1,000 mL (1,000 mLs Intravenous New Bag 2/28/23 1330)       New Prescriptions    No medications on file             Medical Decision Making    Medical Decision Making:   Initial Assessment:   Presents with acute onset epigastric pain and tachycardia  Differential Diagnosis:   Aortic dissection, NSTEMI,   Clinical Tests:   Lab Tests: Ordered and Reviewed  Radiological Study: Ordered and Reviewed  Medical Tests: Ordered and Reviewed  ED Management:  Labs and imaging reviewed by me.  Distal aortic dissection seen.  Probably similar to prior from November.  Discussed admission with patient and  family. They agree  Other:   I have discussed this case with another health care provider.       <> Summary of the Discussion: Case discussed with Dr. Akhtar (Vascular) states there is no surgical intervention need for the dissection.  Discussed case with Dr. Barbosa () will place in observation.          Scribe Attestation:   Scribe #1: I performed the above scribed service and the documentation accurately describes the services I performed. I attest to the accuracy of the note.    Attending:   Physician Attestation Statement for Scribe #1: I, Chaim Ward MD, personally performed the services described in this documentation, as scribed by Marilyn Layne, in my presence, and it is both accurate and complete.          Clinical Impression       ICD-10-CM ICD-9-CM   1. Epigastric pain  R10.13 789.06   2. Chest pain  R07.9 786.50   3. Dissection of abdominal aorta  I71.02 441.02       Disposition:   Disposition: Placed in Observation  Condition: Fair       Chaim Ward MD  02/28/23 5002

## 2023-03-01 ENCOUNTER — SPECIALTY PHARMACY (OUTPATIENT)
Dept: PHARMACY | Facility: CLINIC | Age: 58
End: 2023-03-01
Payer: COMMERCIAL

## 2023-03-01 NOTE — TELEPHONE ENCOUNTER
Specialty Pharmacy - Refill Coordination    Specialty Medication Orders Linked to Encounter      Flowsheet Row Most Recent Value   Medication #1 evolocumab (REPATHA SURECLICK) 140 mg/mL PnIj (Order#[id number], Rx#[id number])            Refill Questions - Documented Responses      Flowsheet Row Most Recent Value   Patient Availability and HIPAA Verification    Does patient want to proceed with activity? Yes   HIPAA/medical authority confirmed? Yes   Relationship to patient of person spoken to? Self   Refill Screening Questions    Would patient like to speak to a pharmacist? No   When does the patient need to receive the medication? 03/07/23   Refill Delivery Questions    How will the patient receive the medication? MEDRx   When does the patient need to receive the medication? 03/07/23   Shipping Address Home   Address in [facility] confirmed and updated if neccessary? Yes   Expected Copay ($) 5   Is the patient able to afford the medication copay? Yes   Payment Method CC on file   Days supply of Refill 28   Supplies needed? No supplies needed   Refill activity completed? Yes   Refill activity plan Refill scheduled   Shipment/Pickup Date: 03/02/23            Current Outpatient Medications   Medication Sig    anastrozole (ARIMIDEX) 1 mg Tab Take 1 tablet (1 mg total) by mouth once daily.    evolocumab (REPATHA SURECLICK) 140 mg/mL PnIj Inject 1 mL (140 mg total) into the skin every 14 (fourteen) days.    ezetimibe (ZETIA) 10 mg tablet Take 1 tablet (10 mg total) by mouth once daily.    gemfibroziL (LOPID) 600 MG tablet Take 600 mg by mouth 2 (two) times daily before meals.    HYDROcodone-acetaminophen (NORCO) 7.5-325 mg per tablet Take 1 tablet by mouth every 6 (six) hours as needed for Pain.    metoprolol succinate (TOPROL-XL) 25 MG 24 hr tablet Take 2 tablets (50 mg total) by mouth once daily.    multivitamin (ONE DAILY MULTIVITAMIN) per tablet Take 1 tablet by mouth once daily.    pantoprazole (PROTONIX) 40  MG tablet Take 1 tablet (40 mg total) by mouth daily as needed (gerd).    predniSONE (DELTASONE) 20 MG tablet Take 1 tablet (20 mg total) by mouth daily as needed (food allergies).    zolpidem (AMBIEN) 10 mg Tab Take 1 tablet (10 mg total) by mouth nightly as needed (insomnia).   Last reviewed on 2/28/2023 12:11 PM by Nilson Hodges    Review of patient's allergies indicates:   Allergen Reactions    Milk Other (See Comments)    Wheat containing prod Hives, Rash, Shortness Of Breath and Swelling    Egg white     Statins-hmg-coa reductase inhibitors Other (See Comments)    Adhesive Rash    Polyester fibers Rash    Last reviewed on  2/28/2023 11:31 AM by Analy Ba      Tasks added this encounter   3/28/2023 - Refill Call (Auto Added)   Tasks due within next 3 months   No tasks due.     Park Moncada, PharmD  [facility]  [address] [place], [place] [address]  [facility] [address]  Phone: [phone]  Fax: [phone]

## 2023-03-02 ENCOUNTER — PATIENT MESSAGE (OUTPATIENT)
Dept: UROLOGY | Facility: CLINIC | Age: 58
End: 2023-03-02
Payer: COMMERCIAL

## 2023-03-02 DIAGNOSIS — N40.0 BENIGN PROSTATIC HYPERPLASIA WITHOUT LOWER URINARY TRACT SYMPTOMS: Primary | ICD-10-CM

## 2023-03-02 RX ORDER — TAMSULOSIN HYDROCHLORIDE 0.4 MG/1
0.4 CAPSULE ORAL DAILY
Qty: 90 CAPSULE | Refills: 3 | Status: SHIPPED | OUTPATIENT
Start: 2023-03-02 | End: 2024-03-18

## 2023-03-07 ENCOUNTER — PATIENT MESSAGE (OUTPATIENT)
Dept: FAMILY MEDICINE | Facility: CLINIC | Age: 58
End: 2023-03-07
Payer: COMMERCIAL

## 2023-03-12 RX ORDER — METOPROLOL SUCCINATE 100 MG/1
100 TABLET, EXTENDED RELEASE ORAL DAILY
Qty: 90 TABLET | Refills: 0 | Status: SHIPPED | OUTPATIENT
Start: 2023-03-12 | End: 2023-06-23

## 2023-03-20 ENCOUNTER — OFFICE VISIT (OUTPATIENT)
Dept: GASTROENTEROLOGY | Facility: CLINIC | Age: 58
End: 2023-03-20
Payer: COMMERCIAL

## 2023-03-20 VITALS — BODY MASS INDEX: 26.48 KG/M2 | WEIGHT: 184.5 LBS

## 2023-03-20 DIAGNOSIS — R93.3 ABNORMAL CT SCAN, STOMACH: ICD-10-CM

## 2023-03-20 DIAGNOSIS — Z86.010 HX OF COLONIC POLYP: ICD-10-CM

## 2023-03-20 DIAGNOSIS — R10.9 RIGHT SIDED ABDOMINAL PAIN: Primary | ICD-10-CM

## 2023-03-20 PROCEDURE — 3008F BODY MASS INDEX DOCD: CPT | Mod: CPTII,95,, | Performed by: NURSE PRACTITIONER

## 2023-03-20 PROCEDURE — 1160F RVW MEDS BY RX/DR IN RCRD: CPT | Mod: CPTII,95,, | Performed by: NURSE PRACTITIONER

## 2023-03-20 PROCEDURE — 99214 OFFICE O/P EST MOD 30 MIN: CPT | Mod: 95,,, | Performed by: NURSE PRACTITIONER

## 2023-03-20 PROCEDURE — 1159F MED LIST DOCD IN RCRD: CPT | Mod: CPTII,95,, | Performed by: NURSE PRACTITIONER

## 2023-03-20 PROCEDURE — 3008F PR BODY MASS INDEX (BMI) DOCUMENTED: ICD-10-PCS | Mod: CPTII,95,, | Performed by: NURSE PRACTITIONER

## 2023-03-20 PROCEDURE — 1159F PR MEDICATION LIST DOCUMENTED IN MEDICAL RECORD: ICD-10-PCS | Mod: CPTII,95,, | Performed by: NURSE PRACTITIONER

## 2023-03-20 PROCEDURE — 1160F PR REVIEW ALL MEDS BY PRESCRIBER/CLIN PHARMACIST DOCUMENTED: ICD-10-PCS | Mod: CPTII,95,, | Performed by: NURSE PRACTITIONER

## 2023-03-20 PROCEDURE — 99214 PR OFFICE/OUTPT VISIT, EST, LEVL IV, 30-39 MIN: ICD-10-PCS | Mod: 95,,, | Performed by: NURSE PRACTITIONER

## 2023-03-20 RX ORDER — SOD SULF/POT CHLORIDE/MAG SULF 1.479 G
12 TABLET ORAL DAILY
Qty: 24 TABLET | Refills: 0 | Status: ON HOLD | OUTPATIENT
Start: 2023-03-20 | End: 2023-04-28 | Stop reason: HOSPADM

## 2023-03-20 RX ORDER — ONDANSETRON 4 MG/1
TABLET, ORALLY DISINTEGRATING ORAL
Qty: 6 TABLET | Refills: 0 | Status: SHIPPED | OUTPATIENT
Start: 2023-03-20 | End: 2023-08-11

## 2023-03-20 NOTE — PROGRESS NOTES
Clinic Follow Up:  [facility] Follow Up Note    Reason for Follow Up:  The primary encounter diagnosis was Right sided abdominal pain. Diagnoses of Abnormal CT scan, stomach and Hx of colonic polyp were also pertinent to this visit.    PCP: Kaila West       The patient location is: [place]   The chief complaint leading to consultation is: abnormal CT scan    Visit type: audiovisual    Face to Face time with patient: 10 minutes   15 minutes of total time spent on the encounter, which includes face to face time and non-face to face time preparing to see the patient (eg, review of tests), Obtaining and/or reviewing separately obtained history, Documenting clinical information in the electronic or other health record, Independently interpreting results (not separately reported) and communicating results to the patient/family/caregiver, or Care coordination (not separately reported).         Each patient to whom he or she provides medical services by telemedicine is:  (1) informed of the relationship between the physician and patient and the respective role of any other health care provider with respect to management of the patient; and (2) notified that he or she may decline to receive medical services by telemedicine and may withdraw from such care at any time.    Notes:       HPI:  This is a 57 y.o. male here for follow up of the above.   He does report some continued right sided abdominal pain that he had at his last visit in October 2022. He was scheduled to have colonoscopy in 10/2022 but was not completed.     He had a CT scan in February 2023 that showed possible gastritis.     Review of Systems   Constitutional:  Negative for activity change and appetite change.        As per interval history above   Respiratory:  Negative for cough and shortness of breath.    Cardiovascular:  Negative for chest pain.   Gastrointestinal:  Positive for abdominal pain. Negative for constipation,  diarrhea, nausea and vomiting.   Skin:  Negative for color change and rash.     Medical History:  Past Medical History:   Diagnosis Date    Hyperlipidemia     Hypertension     NCGS (non-celiac gluten sensitivity)     Polycythemia     Preop cardiovascular exam 11/30/2022    Statin intolerance     Supraventricular tachycardia     SVT (supraventricular tachycardia)        Surgical History:   Past Surgical History:   Procedure Laterality Date    DENTAL SURGERY      dental implant    FINGER SURGERY         Family History:   Family History   Problem Relation Age of Onset    Hypertension Mother     Cancer Father         cancer - leukemia    Cancer Brother         lung       Social History:   Social History     Tobacco Use    Smoking status: Former     Packs/day: 1.00     Years: 5.00     Pack years: 5.00     Types: Cigarettes    Smokeless tobacco: Never    Tobacco comments:     Quit 19 yo   Substance Use Topics    Alcohol use: Not Currently    Drug use: Not Currently       Allergies:   Review of patient's allergies indicates:   Allergen Reactions    Milk Other (See Comments)    Wheat containing prod Hives, Rash, Shortness Of Breath and Swelling    Egg white     Gabapentin      Tachycardia, palpitations    Statins-hmg-coa reductase inhibitors Other (See Comments)    Adhesive Rash    Polyester fibers Rash       Home Medications:  Current Outpatient Medications on File Prior to Visit   Medication Sig Dispense Refill    anastrozole (ARIMIDEX) 1 mg Tab Take 1 tablet (1 mg total) by mouth once daily. 90 tablet 3    evolocumab (REPATHA SURECLICK) 140 mg/mL PnIj Inject 1 mL (140 mg total) into the skin every 14 (fourteen) days. 6 mL 4    ezetimibe (ZETIA) 10 mg tablet Take 1 tablet (10 mg total) by mouth once daily. 90 tablet 3    metoprolol succinate (TOPROL-XL) 100 MG 24 hr tablet Take 1 tablet (100 mg total) by mouth once daily. 90 tablet 0    multivitamin (THERAGRAN) per tablet Take 1 tablet by mouth once daily.       pantoprazole (PROTONIX) 40 MG tablet Take 1 tablet (40 mg total) by mouth daily as needed (gerd). 90 tablet 0    predniSONE (DELTASONE) 20 MG tablet Take 1 tablet (20 mg total) by mouth daily as needed (food allergies). 90 tablet 0    tamsulosin (FLOMAX) 0.4 mg Cap Take 1 capsule (0.4 mg total) by mouth once daily. 90 capsule 3    zolpidem (AMBIEN) 10 mg Tab TAKE 1 TABLET BY MOUTH NIGHTLY AS NEEDED FOR INSOMNIA 30 tablet 0    gemfibroziL (LOPID) 600 MG tablet Take 600 mg by mouth 2 (two) times daily before meals.      HYDROcodone-acetaminophen (NORCO) 7.5-325 mg per tablet Take 1 tablet by mouth every 6 (six) hours as needed for Pain. (Patient not taking: Reported on 3/20/2023) 20 tablet 0     Current Facility-Administered Medications on File Prior to Visit   Medication Dose Route Frequency Provider Last Rate Last Admin    omalizumab injection 300 mg  300 mg Subcutaneous Q28 Days Carolyn Ortiz MD   300 mg at 01/10/23 1414       Wt 83.7 kg (184 lb 8.4 oz)   BMI 26.48 kg/m²   Body mass index is 26.48 kg/m².  Physical Exam  Constitutional:       General: He is not in acute distress.  HENT:      Head: Normocephalic.   Neurological:      General: No focal deficit present.      Mental Status: He is alert.   Psychiatric:         Mood and Affect: Mood normal.         Judgment: Judgment normal.       Labs: Pertinent labs reviewed.    Assessment:   1. Right sided abdominal pain    2. Abnormal CT scan, stomach    3. Hx of colonic polyp        Recommendations:   EGD and colonoscopy  A referral has been placed for endoscopy procedure scheduling and pre-admission testing (PAT) appointment has been scheduled.      Right sided abdominal pain  -     Case Request Endoscopy: EGD (ESOPHAGOGASTRODUODENOSCOPY), COLONOSCOPY  -     Ambulatory referral/consult to Endo Procedure ; Future; Expected date: 03/21/2023  -     sod sulf-pot chloride-mag sulf (SUTAB) 1.479-0.188- 0.225 gram tablet; Take 12 tablets by mouth once daily. Take  according to package instructions with indicated amount of water.  Dispense: 24 tablet; Refill: 0  -     ondansetron (ZOFRAN-ODT) 4 MG TbDL; 1-2 tablets PO every 6 hours as needed for nausea/vomiting while completing bowel prep  Dispense: 6 tablet; Refill: 0    Abnormal CT scan, stomach  -     Case Request Endoscopy: EGD (ESOPHAGOGASTRODUODENOSCOPY), COLONOSCOPY  -     Ambulatory referral/consult to Endo Procedure ; Future; Expected date: 03/21/2023  -     sod sulf-pot chloride-mag sulf (SUTAB) 1.479-0.188- 0.225 gram tablet; Take 12 tablets by mouth once daily. Take according to package instructions with indicated amount of water.  Dispense: 24 tablet; Refill: 0  -     ondansetron (ZOFRAN-ODT) 4 MG TbDL; 1-2 tablets PO every 6 hours as needed for nausea/vomiting while completing bowel prep  Dispense: 6 tablet; Refill: 0    Hx of colonic polyp  -     Case Request Endoscopy: EGD (ESOPHAGOGASTRODUODENOSCOPY), COLONOSCOPY  -     Ambulatory referral/consult to Endo Procedure ; Future; Expected date: 03/21/2023  -     sod sulf-pot chloride-mag sulf (SUTAB) 1.479-0.188- 0.225 gram tablet; Take 12 tablets by mouth once daily. Take according to package instructions with indicated amount of water.  Dispense: 24 tablet; Refill: 0  -     ondansetron (ZOFRAN-ODT) 4 MG TbDL; 1-2 tablets PO every 6 hours as needed for nausea/vomiting while completing bowel prep  Dispense: 6 tablet; Refill: 0      Return to Clinic:  Follow up to be determined after results/ procedure(s).    Thank you for the opportunity to participate in the care of this patient.  DERRICK Brown

## 2023-03-28 ENCOUNTER — PATIENT MESSAGE (OUTPATIENT)
Dept: PHARMACY | Facility: CLINIC | Age: 58
End: 2023-03-28
Payer: COMMERCIAL

## 2023-03-28 ENCOUNTER — HOSPITAL ENCOUNTER (OUTPATIENT)
Dept: PREADMISSION TESTING | Facility: HOSPITAL | Age: 58
Discharge: HOME | End: 2023-03-28
Attending: COLON & RECTAL SURGERY
Payer: COMMERCIAL

## 2023-03-28 DIAGNOSIS — R93.3 ABNORMAL CT SCAN, STOMACH: ICD-10-CM

## 2023-03-28 DIAGNOSIS — Z86.010 HX OF COLONIC POLYP: ICD-10-CM

## 2023-03-28 DIAGNOSIS — R10.9 RIGHT SIDED ABDOMINAL PAIN: ICD-10-CM

## 2023-03-31 ENCOUNTER — SPECIALTY PHARMACY (OUTPATIENT)
Dept: PHARMACY | Facility: CLINIC | Age: 58
End: 2023-03-31
Payer: COMMERCIAL

## 2023-03-31 NOTE — TELEPHONE ENCOUNTER
Specialty Pharmacy - Refill Coordination    Specialty Medication Orders Linked to Encounter      Flowsheet Row Most Recent Value   Medication #1 evolocumab (REPATHA SURECLICK) 140 mg/mL PnIj (Order#[id number], Rx#[id number])            Refill Questions - Documented Responses      Flowsheet Row Most Recent Value   Patient Availability and HIPAA Verification    Does patient want to proceed with activity? Yes   HIPAA/medical authority confirmed? Yes   Relationship to patient of person spoken to? Self   Refill Screening Questions    Would patient like to speak to a pharmacist? No   When does the patient need to receive the medication? 04/04/23   Refill Delivery Questions    How will the patient receive the medication? MED[facility]   When does the patient need to receive the medication? 04/04/23   Shipping Address Home   Address in [facility] confirmed and updated if neccessary? Yes   Expected Copay ($) 5   Is the patient able to afford the medication copay? Yes   Payment Method CC on file   Days supply of Refill 28   Supplies needed? No supplies needed   Refill activity completed? Yes   Refill activity plan Refill scheduled   Shipment/Pickup Date: 04/03/23            Current Outpatient Medications   Medication Sig    anastrozole (ARIMIDEX) 1 mg Tab Take 1 tablet (1 mg total) by mouth once daily.    evolocumab (REPATHA SURECLICK) 140 mg/mL PnIj Inject 1 mL (140 mg total) into the skin every 14 (fourteen) days.    ezetimibe (ZETIA) 10 mg tablet Take 1 tablet (10 mg total) by mouth once daily.    HYDROcodone-acetaminophen (NORCO) 7.5-325 mg per tablet Take 1 tablet by mouth every 6 (six) hours as needed for Pain. (Patient not taking: Reported on 3/20/2023)    metoprolol succinate (TOPROL-XL) 100 MG 24 hr tablet Take 1 tablet (100 mg total) by mouth once daily.    multivitamin (THERAGRAN) per tablet Take 1 tablet by mouth once daily.    ondansetron (ZOFRAN-ODT) 4 MG TbDL 1-2 tablets PO every 6 hours as needed for  nausea/vomiting while completing bowel prep    pantoprazole (PROTONIX) 40 MG tablet Take 1 tablet (40 mg total) by mouth daily as needed (gerd).    predniSONE (DELTASONE) 20 MG tablet Take 1 tablet (20 mg total) by mouth daily as needed (food allergies).    sod sulf-pot chloride-mag sulf (SUTAB) 1.479-0.188- 0.225 gram tablet Take 12 tablets by mouth once daily. Take according to package instructions with indicated amount of water.    tamsulosin (FLOMAX) 0.4 mg Cap Take 1 capsule (0.4 mg total) by mouth once daily.    zolpidem (AMBIEN) 10 mg Tab TAKE 1 TABLET BY MOUTH NIGHTLY AS NEEDED FOR INSOMNIA   Last reviewed on 3/20/2023  8:17 AM by Madeleine Dhaliwal NP    Review of patient's allergies indicates:   Allergen Reactions    Milk Other (See Comments)    Wheat containing prod Hives, Rash, Shortness Of Breath and Swelling    Egg white     Gabapentin      Tachycardia, palpitations    Statins-hmg-coa reductase inhibitors Other (See Comments)    Adhesive Rash    Polyester fibers Rash    Last reviewed on  3/28/2023 9:34 AM by Vika Mullen      Tasks added this encounter   No tasks added.   Tasks due within next 3 months   3/28/2023 - Refill Call (Auto Added)     DEEDEE HOLM, PharmD  [facility]  [address] [place] [id number]  Phone: [phone]  Fax: [phone]

## 2023-04-12 ENCOUNTER — OFFICE VISIT (OUTPATIENT)
Dept: CARDIOLOGY | Facility: CLINIC | Age: 58
End: 2023-04-12
Payer: COMMERCIAL

## 2023-04-12 VITALS
WEIGHT: 177.69 LBS | HEART RATE: 90 BPM | OXYGEN SATURATION: 99 % | DIASTOLIC BLOOD PRESSURE: 80 MMHG | BODY MASS INDEX: 25.44 KG/M2 | SYSTOLIC BLOOD PRESSURE: 126 MMHG | HEIGHT: 70 IN

## 2023-04-12 DIAGNOSIS — D75.1 POLYCYTHEMIA: ICD-10-CM

## 2023-04-12 DIAGNOSIS — I70.0 AORTIC ATHEROSCLEROSIS: ICD-10-CM

## 2023-04-12 DIAGNOSIS — I47.10 SVT (SUPRAVENTRICULAR TACHYCARDIA): ICD-10-CM

## 2023-04-12 DIAGNOSIS — I10 PRIMARY HYPERTENSION: ICD-10-CM

## 2023-04-12 DIAGNOSIS — I73.9 PVD (PERIPHERAL VASCULAR DISEASE): Primary | ICD-10-CM

## 2023-04-12 DIAGNOSIS — E78.5 DYSLIPIDEMIA: ICD-10-CM

## 2023-04-12 DIAGNOSIS — I71.43 INFRARENAL ABDOMINAL AORTIC ANEURYSM (AAA) WITHOUT RUPTURE: ICD-10-CM

## 2023-04-12 DIAGNOSIS — I77.819 AORTIC ECTASIA: ICD-10-CM

## 2023-04-12 DIAGNOSIS — Z78.9 STATIN INTOLERANCE: ICD-10-CM

## 2023-04-12 PROCEDURE — 3074F SYST BP LT 130 MM HG: CPT | Mod: CPTII,S$GLB,, | Performed by: INTERNAL MEDICINE

## 2023-04-12 PROCEDURE — 99213 PR OFFICE/OUTPT VISIT, EST, LEVL III, 20-29 MIN: ICD-10-PCS | Mod: S$GLB,,, | Performed by: INTERNAL MEDICINE

## 2023-04-12 PROCEDURE — 3079F DIAST BP 80-89 MM HG: CPT | Mod: CPTII,S$GLB,, | Performed by: INTERNAL MEDICINE

## 2023-04-12 PROCEDURE — 1159F PR MEDICATION LIST DOCUMENTED IN MEDICAL RECORD: ICD-10-PCS | Mod: CPTII,S$GLB,, | Performed by: INTERNAL MEDICINE

## 2023-04-12 PROCEDURE — 3008F BODY MASS INDEX DOCD: CPT | Mod: CPTII,S$GLB,, | Performed by: INTERNAL MEDICINE

## 2023-04-12 PROCEDURE — 99213 OFFICE O/P EST LOW 20 MIN: CPT | Mod: S$GLB,,, | Performed by: INTERNAL MEDICINE

## 2023-04-12 PROCEDURE — 99999 PR PBB SHADOW E&M-EST. PATIENT-LVL IV: CPT | Mod: PBBFAC,,, | Performed by: INTERNAL MEDICINE

## 2023-04-12 PROCEDURE — 1159F MED LIST DOCD IN RCRD: CPT | Mod: CPTII,S$GLB,, | Performed by: INTERNAL MEDICINE

## 2023-04-12 PROCEDURE — 3079F PR MOST RECENT DIASTOLIC BLOOD PRESSURE 80-89 MM HG: ICD-10-PCS | Mod: CPTII,S$GLB,, | Performed by: INTERNAL MEDICINE

## 2023-04-12 PROCEDURE — 99999 PR PBB SHADOW E&M-EST. PATIENT-LVL IV: ICD-10-PCS | Mod: PBBFAC,,, | Performed by: INTERNAL MEDICINE

## 2023-04-12 PROCEDURE — 3008F PR BODY MASS INDEX (BMI) DOCUMENTED: ICD-10-PCS | Mod: CPTII,S$GLB,, | Performed by: INTERNAL MEDICINE

## 2023-04-12 PROCEDURE — 3074F PR MOST RECENT SYSTOLIC BLOOD PRESSURE < 130 MM HG: ICD-10-PCS | Mod: CPTII,S$GLB,, | Performed by: INTERNAL MEDICINE

## 2023-04-12 NOTE — PROGRESS NOTES
Subjective:      Harish Busby is a 57 y.o. male, here today with C/C of:  Follow-up on PHN      HPI:    Mr. Busby is here for f/u of PHN.  Last seen in office in Feb '23 for shingles/PHN.  Tx with Norco.  He had tried gabapentin but was not effective and caused s/e.  Norco helps some, pain seems to be worse at night.  He expresses concerns as he will be leaving for [employer]/business trip.  Plenty of sitting in meetings, walking, long days.  Current pain 8/10, slowly/grad improving.      Review of Systems   Constitutional:  Positive for activity change (d/t pain). Negative for chills, fever and unexpected weight change.   HENT:  Negative for hearing loss, rhinorrhea and trouble swallowing.    Eyes:  Negative for visual disturbance.   Respiratory:  Negative for chest tightness and wheezing.    Cardiovascular:  Negative for chest pain and palpitations.   Gastrointestinal:  Negative for blood in stool, constipation, diarrhea and vomiting.   Endocrine: Negative for polydipsia and polyuria.   Genitourinary:  Negative for difficulty urinating, hematuria and urgency.   Musculoskeletal:  Negative for arthralgias, joint swelling and neck pain.   Skin: Negative.    Neurological:  Negative for weakness, numbness and headaches.        PHN issue   Psychiatric/Behavioral:  Negative for confusion and dysphoric mood.        Review of patient's allergies indicates:   Allergen Reactions    Milk Other (See Comments)    Wheat containing prod Hives, Rash, Shortness Of Breath and Swelling    Egg white     Gabapentin      Tachycardia, palpitations    Statins-hmg-coa reductase inhibitors Other (See Comments)    Adhesive Rash    Polyester fibers Rash       Patient Active Problem List   Diagnosis    SVT (supraventricular tachycardia)    Polycythemia    NCGS (non-celiac gluten sensitivity)    Statin intolerance    Primary hypertension    Dyslipidemia    Kidney cysts    Kidney stones    Benign prostatic hyperplasia without lower urinary  tract symptoms    Aortic ectasia    Constipation    Infrarenal abdominal aortic aneurysm (AAA) without rupture    PVD (peripheral vascular disease)    Aortic atherosclerosis         Current Outpatient Medications:     anastrozole (ARIMIDEX) 1 mg Tab, Take 1 tablet (1 mg total) by mouth once daily., Disp: 90 tablet, Rfl: 3    evolocumab (REPATHA SURECLICK) 140 mg/mL PnIj, Inject 1 mL (140 mg total) into the skin every 14 (fourteen) days., Disp: 6 mL, Rfl: 4    ezetimibe (ZETIA) 10 mg tablet, Take 1 tablet (10 mg total) by mouth once daily., Disp: 90 tablet, Rfl: 3    metoprolol succinate (TOPROL-XL) 100 MG 24 hr tablet, Take 1 tablet (100 mg total) by mouth once daily., Disp: 90 tablet, Rfl: 0    multivitamin (THERAGRAN) per tablet, Take 1 tablet by mouth once daily., Disp: , Rfl:     ondansetron (ZOFRAN-ODT) 4 MG TbDL, 1-2 tablets PO every 6 hours as needed for nausea/vomiting while completing bowel prep, Disp: 6 tablet, Rfl: 0    pantoprazole (PROTONIX) 40 MG tablet, Take 1 tablet (40 mg total) by mouth daily as needed (gerd)., Disp: 90 tablet, Rfl: 0    predniSONE (DELTASONE) 20 MG tablet, Take 1 tablet (20 mg total) by mouth daily as needed (food allergies)., Disp: 90 tablet, Rfl: 0    sod sulf-pot chloride-mag sulf (SUTAB) 1.479-0.188- 0.225 gram tablet, Take 12 tablets by mouth once daily. Take according to package instructions with indicated amount of water., Disp: 24 tablet, Rfl: 0    tamsulosin (FLOMAX) 0.4 mg Cap, Take 1 capsule (0.4 mg total) by mouth once daily., Disp: 90 capsule, Rfl: 3    zolpidem (AMBIEN) 10 mg Tab, TAKE 1 TABLET BY MOUTH NIGHTLY AS NEEDED FOR INSOMNIA, Disp: 30 tablet, Rfl: 0    Current Facility-Administered Medications:     omalizumab injection 300 mg, 300 mg, Subcutaneous, Q28 Days, Carolyn Ortiz MD, 300 mg at 01/10/23 1414      Past medical, surgical, family and social histories have been reviewed today.      Objective:     Vitals:    04/13/23 1128   BP: 130/80   Pulse: 90   Temp:  "97.4 °F (36.3 °C)   SpO2: 95%   Weight: 81 kg (178 lb 9.2 oz)   Height: 5' 10" (1.778 m)   PainSc:   8   PainLoc: Hip/Back       Physical Exam  Vitals (Complete PE deferred today) reviewed.   HENT:      Head: Normocephalic and atraumatic.   Neurological:      Mental Status: He is alert and oriented to person, place, and time.      Motor: No weakness.      Gait: Gait normal.   Psychiatric:         Mood and Affect: Mood normal.         Behavior: Behavior normal.         Thought Content: Thought content normal.         Judgment: Judgment normal.       Diagnosis/Assessment:     1. PHN (postherpetic neuralgia)  HYDROcodone-acetaminophen (NORCO)  mg per tablet    Persistent pain s/p shingles rash Jan 2023.  Gabapentin was not effective and caused s/e.  He does use Lidocaine cream as needed, helps some.  Declines SNRI, will be leaving on business trip soon and wants medication to work fairly soon.  Declines anything overly sedating.  He requests refill on his Norco at higher dose to take on prn basis, can break in 1/2 if needed.  He is not sure how he will fare with the upcoming sitting, standing and walking.  Provided him with Norco rx for now, but advised that this is not going to be long-term.  If any further pain med is needed, may need to consider starting on SNRI or tricyclic.          Follow-up:     RTC as directed and/or prn.        AYAN Vann  [facility]       20 minutes of total time spent on the encounter, which includes face to face time and non-face to face time preparing to see the patient.  This includes obtaining and/or reviewing separately obtained history, performing a medically appropriate examination and/or evaluation, and counseling and educating the patient/family/caregiver.  Includes documenting clinical information in the electronic or other health record, independently interpreting results (not separately reported) and communicating results to the " patient/family/caregiver, with care coordination (not separately reported).  Medications, tests and/or procedures ordered as necessary along with referring and communicating with other health professionals (when not separately reported).

## 2023-04-12 NOTE — PROGRESS NOTES
Subjective:   Patient ID:  Harish Busby is a 57 y.o. male who presents for follow up of Shortness of Breath and Palpitations      HPI  11/30/2022  A 58 yo male with svt hyperlipidemia htn statin intolerance is here for preop eval. He was seen by DR BONILLA DUE TO ABDOMINAL ANEURYSM SACCULAR 2.5 CM WITH FOCAL DISSECTION. HE HAS ABDOMINAL BLOATING HE DOES ABDOMINAL EXERCISE HAD RT SIDED LOWER ABDOMINAL FLA\NK PAIN HAD DRAINAGE OF LARGE RENAL CYST W/O RELIEF. HE HAS SIGNIFICANT FECAL MATERIAL IN COLON. HE IS TAKING MIRALAX FOR CONSTIPATION. HAS NO REGULAR EXERCISE. HE USED TOW ALK AND EXERCISES EARLIER IN THE YEAR. HAS QUIT SMOKING AT THE AGE OF 20 NO FH OF ANEURYSM. HE IS ALLERGIC TO STATINS.   HAS SVT REASONABLY CONTROLLED WITH B BLOCKERS.    HE HAD A W/U AT [facility] [place] 2 YEARS AGO WITH HOLTER ECHO AND STRESS TEST.     4/12/2023  Had shingles has a lot of pain felt heart rate increase. He feels  better with his heart tricia he is back to normal. He is back on 100 mg toprol daily.,he had a small dissection .  He took zolar for allergy he had shingles 6 days later. He is not exercising . He is trying to stay active had rough time with shingles limiting him. He has no limitation in activity due to his pvd. He is tolerating repatha well.  Past Medical History:   Diagnosis Date    Hyperlipidemia     Hypertension     NCGS (non-celiac gluten sensitivity)     Polycythemia     Preop cardiovascular exam 11/30/2022    Statin intolerance     Supraventricular tachycardia     SVT (supraventricular tachycardia)        Past Surgical History:   Procedure Laterality Date    DENTAL SURGERY      dental implant    FINGER SURGERY         Social History     Tobacco Use    Smoking status: Former     Packs/day: 1.00     Years: 5.00     Pack years: 5.00     Types: Cigarettes    Smokeless tobacco: Never    Tobacco comments:     Quit 19 yo   Substance Use Topics    Alcohol use: Not Currently    Drug use: Not Currently       Family History   Problem  Relation Age of Onset    Hypertension Mother     Cancer Father         cancer - leukemia    Cancer Brother         lung       Current Outpatient Medications   Medication Sig    anastrozole (ARIMIDEX) 1 mg Tab Take 1 tablet (1 mg total) by mouth once daily.    evolocumab (REPATHA SURECLICK) 140 mg/mL PnIj Inject 1 mL (140 mg total) into the skin every 14 (fourteen) days.    ezetimibe (ZETIA) 10 mg tablet Take 1 tablet (10 mg total) by mouth once daily.    metoprolol succinate (TOPROL-XL) 100 MG 24 hr tablet Take 1 tablet (100 mg total) by mouth once daily.    multivitamin (THERAGRAN) per tablet Take 1 tablet by mouth once daily.    ondansetron (ZOFRAN-ODT) 4 MG TbDL 1-2 tablets PO every 6 hours as needed for nausea/vomiting while completing bowel prep    pantoprazole (PROTONIX) 40 MG tablet Take 1 tablet (40 mg total) by mouth daily as needed (gerd).    predniSONE (DELTASONE) 20 MG tablet Take 1 tablet (20 mg total) by mouth daily as needed (food allergies).    sod sulf-pot chloride-mag sulf (SUTAB) 1.479-0.188- 0.225 gram tablet Take 12 tablets by mouth once daily. Take according to package instructions with indicated amount of water.    tamsulosin (FLOMAX) 0.4 mg Cap Take 1 capsule (0.4 mg total) by mouth once daily.    zolpidem (AMBIEN) 10 mg Tab TAKE 1 TABLET BY MOUTH NIGHTLY AS NEEDED FOR INSOMNIA     Current Facility-Administered Medications   Medication    omalizumab injection 300 mg     Current Outpatient Medications on File Prior to Visit   Medication Sig    anastrozole (ARIMIDEX) 1 mg Tab Take 1 tablet (1 mg total) by mouth once daily.    evolocumab (REPATHA SURECLICK) 140 mg/mL PnIj Inject 1 mL (140 mg total) into the skin every 14 (fourteen) days.    ezetimibe (ZETIA) 10 mg tablet Take 1 tablet (10 mg total) by mouth once daily.    metoprolol succinate (TOPROL-XL) 100 MG 24 hr tablet Take 1 tablet (100 mg total) by mouth once daily.    multivitamin (THERAGRAN) per tablet Take 1 tablet by mouth once daily.     ondansetron (ZOFRAN-ODT) 4 MG TbDL 1-2 tablets PO every 6 hours as needed for nausea/vomiting while completing bowel prep    pantoprazole (PROTONIX) 40 MG tablet Take 1 tablet (40 mg total) by mouth daily as needed (gerd).    predniSONE (DELTASONE) 20 MG tablet Take 1 tablet (20 mg total) by mouth daily as needed (food allergies).    sod sulf-pot chloride-mag sulf (SUTAB) 1.479-0.188- 0.225 gram tablet Take 12 tablets by mouth once daily. Take according to package instructions with indicated amount of water.    tamsulosin (FLOMAX) 0.4 mg Cap Take 1 capsule (0.4 mg total) by mouth once daily.    zolpidem (AMBIEN) 10 mg Tab TAKE 1 TABLET BY MOUTH NIGHTLY AS NEEDED FOR INSOMNIA    [DISCONTINUED] HYDROcodone-acetaminophen (NORCO) 7.5-325 mg per tablet Take 1 tablet by mouth every 6 (six) hours as needed for Pain. (Patient not taking: Reported on 3/20/2023)     Current Facility-Administered Medications on File Prior to Visit   Medication    omalizumab injection 300 mg       Review of Systems   Constitutional: Negative for malaise/fatigue.   Eyes:  Negative for blurred vision.   Cardiovascular:  Negative for chest pain, claudication, cyanosis, dyspnea on exertion, irregular heartbeat, leg swelling, near-syncope, orthopnea, palpitations and paroxysmal nocturnal dyspnea.   Respiratory:  Negative for cough, hemoptysis and shortness of breath.    Hematologic/Lymphatic: Negative for bleeding problem. Does not bruise/bleed easily.   Skin:  Negative for dry skin and itching.   Musculoskeletal:  Negative for falls, muscle weakness and myalgias.   Gastrointestinal:  Negative for abdominal pain, diarrhea, heartburn, hematemesis, hematochezia and melena.   Genitourinary:  Negative for flank pain and hematuria.   Neurological:  Negative for dizziness, focal weakness, headaches, light-headedness, numbness, paresthesias, seizures and weakness.   Psychiatric/Behavioral:  Negative for altered mental status and memory loss. The patient  "is not nervous/anxious.    Allergic/Immunologic: Negative for hives.     Objective:   Physical Exam  Vitals:    04/12/23 1614 04/12/23 1616   BP: 128/82 126/80   BP Location: Left arm Right arm   Patient Position: Sitting Sitting   BP Method: Large (Manual) Large (Manual)   Pulse: 90    SpO2: 99%    Weight: 80.6 kg (177 lb 11.1 oz)    Height: 5' 10" (1.778 m)      Lab Results   Component Value Date    CHOL 216 (H) 03/21/2022      Body mass index is 25.5 kg/m².   No results found for: LABA1C, HGBA1C   BMP  Lab Results   Component Value Date     02/28/2023    K 3.6 02/28/2023     02/28/2023    CO2 24 02/28/2023    BUN 18 02/28/2023    CREATININE 1.2 02/28/2023    CALCIUM 8.9 02/28/2023    ANIONGAP 12 02/28/2023    EGFRNORACEVR >60 02/28/2023      Lab Results   Component Value Date    HDL 50 03/21/2022     Lab Results   Component Value Date    LDLCALC 141 (H) 03/21/2022     Lab Results   Component Value Date    TRIG 126 03/21/2022     Lab Results   Component Value Date    CHOLHDL 4.3 03/21/2022       Chemistry        Component Value Date/Time     02/28/2023 1139    K 3.6 02/28/2023 1139     02/28/2023 1139    CO2 24 02/28/2023 1139    BUN 18 02/28/2023 1139    CREATININE 1.2 02/28/2023 1139     02/28/2023 1139        Component Value Date/Time    CALCIUM 8.9 02/28/2023 1139    ALKPHOS 53 (L) 02/28/2023 1139    AST 20 02/28/2023 1139    ALT 43 02/28/2023 1139    BILITOT 0.5 02/28/2023 1139    ESTGFRAFRICA 81 03/21/2022 0000    EGFRNONAA 70.0 03/21/2022 0000          No results found for: TSH  Lab Results   Component Value Date    INR 1.0 12/28/2022    INR 1.0 11/15/2022     Lab Results   Component Value Date    WBC 9.63 02/28/2023    HGB 14.1 02/28/2023    HCT 43.5 02/28/2023    MCV 87 02/28/2023     02/28/2023     BMP  Sodium   Date Value Ref Range Status   02/28/2023 138 136 - 145 mmol/L Final     Potassium   Date Value Ref Range Status   02/28/2023 3.6 3.5 - 5.1 mmol/L Final "     Chloride   Date Value Ref Range Status   02/28/2023 102 95 - 110 mmol/L Final     CO2   Date Value Ref Range Status   02/28/2023 24 23 - 29 mmol/L Final     BUN   Date Value Ref Range Status   02/28/2023 18 6 - 20 mg/dL Final     Creatinine   Date Value Ref Range Status   02/28/2023 1.2 0.5 - 1.4 mg/dL Final     Calcium   Date Value Ref Range Status   02/28/2023 8.9 8.7 - 10.5 mg/dL Final     Anion Gap   Date Value Ref Range Status   02/28/2023 12 8 - 16 mmol/L Final     eGFR if    Date Value Ref Range Status   03/21/2022 81 >OR=60 mL/min/1.73 m2 Final     eGFR if non    Date Value Ref Range Status   03/21/2022 70.0 >OR=60 mL/min/1.73 m2 Final     CrCl cannot be calculated (Patient's most recent lab result is older than the maximum 7 days allowed.).    Assessment:     1. PVD (peripheral vascular disease)    2. Aortic atherosclerosis    3. Aortic ectasia    4. Dyslipidemia    5. Primary hypertension    6. SVT (supraventricular tachycardia)    7. Infrarenal abdominal aortic aneurysm (AAA) without rupture    8. Statin intolerance    9. Polycythemia      Asymptomatic pvd dissection wise will continue conservative management and  therapy for lipids reviewed last ct from 2/28/2023.   Htn controlled low salt diet    Chronic allergy on steroids.   Has shingles with hyperadrenergic state initially THAT IS IMPORVING.continue same therapy.    Plan:     Continue current therapy  Cardiac low salt diet.  Risk factor modification and excercise program.  Smoking cessation counseling  F/u in 6 months with lipid cmp

## 2023-04-13 ENCOUNTER — OFFICE VISIT (OUTPATIENT)
Dept: FAMILY MEDICINE | Facility: CLINIC | Age: 58
End: 2023-04-13
Payer: COMMERCIAL

## 2023-04-13 VITALS
DIASTOLIC BLOOD PRESSURE: 80 MMHG | HEIGHT: 70 IN | HEART RATE: 90 BPM | WEIGHT: 178.56 LBS | TEMPERATURE: 97 F | SYSTOLIC BLOOD PRESSURE: 130 MMHG | BODY MASS INDEX: 25.56 KG/M2 | OXYGEN SATURATION: 95 %

## 2023-04-13 DIAGNOSIS — B02.29 PHN (POSTHERPETIC NEURALGIA): Primary | ICD-10-CM

## 2023-04-13 PROCEDURE — 1159F MED LIST DOCD IN RCRD: CPT | Mod: CPTII,S$GLB,, | Performed by: REGISTERED NURSE

## 2023-04-13 PROCEDURE — 99213 PR OFFICE/OUTPT VISIT, EST, LEVL III, 20-29 MIN: ICD-10-PCS | Mod: S$GLB,,, | Performed by: REGISTERED NURSE

## 2023-04-13 PROCEDURE — 99999 PR PBB SHADOW E&M-EST. PATIENT-LVL IV: ICD-10-PCS | Mod: PBBFAC,,, | Performed by: REGISTERED NURSE

## 2023-04-13 PROCEDURE — 3079F DIAST BP 80-89 MM HG: CPT | Mod: CPTII,S$GLB,, | Performed by: REGISTERED NURSE

## 2023-04-13 PROCEDURE — 3075F SYST BP GE 130 - 139MM HG: CPT | Mod: CPTII,S$GLB,, | Performed by: REGISTERED NURSE

## 2023-04-13 PROCEDURE — 3079F PR MOST RECENT DIASTOLIC BLOOD PRESSURE 80-89 MM HG: ICD-10-PCS | Mod: CPTII,S$GLB,, | Performed by: REGISTERED NURSE

## 2023-04-13 PROCEDURE — 3008F BODY MASS INDEX DOCD: CPT | Mod: CPTII,S$GLB,, | Performed by: REGISTERED NURSE

## 2023-04-13 PROCEDURE — 3008F PR BODY MASS INDEX (BMI) DOCUMENTED: ICD-10-PCS | Mod: CPTII,S$GLB,, | Performed by: REGISTERED NURSE

## 2023-04-13 PROCEDURE — 1159F PR MEDICATION LIST DOCUMENTED IN MEDICAL RECORD: ICD-10-PCS | Mod: CPTII,S$GLB,, | Performed by: REGISTERED NURSE

## 2023-04-13 PROCEDURE — 99213 OFFICE O/P EST LOW 20 MIN: CPT | Mod: S$GLB,,, | Performed by: REGISTERED NURSE

## 2023-04-13 PROCEDURE — 3075F PR MOST RECENT SYSTOLIC BLOOD PRESS GE 130-139MM HG: ICD-10-PCS | Mod: CPTII,S$GLB,, | Performed by: REGISTERED NURSE

## 2023-04-13 PROCEDURE — 99999 PR PBB SHADOW E&M-EST. PATIENT-LVL IV: CPT | Mod: PBBFAC,,, | Performed by: REGISTERED NURSE

## 2023-04-13 RX ORDER — HYDROCODONE BITARTRATE AND ACETAMINOPHEN 10; 325 MG/1; MG/1
1 TABLET ORAL EVERY 12 HOURS PRN
Qty: 14 TABLET | Refills: 0 | Status: SHIPPED | OUTPATIENT
Start: 2023-04-13 | End: 2023-08-11

## 2023-04-19 RX ORDER — ZOLPIDEM TARTRATE 10 MG/1
TABLET ORAL
Qty: 30 TABLET | Refills: 0 | Status: SHIPPED | OUTPATIENT
Start: 2023-04-19 | End: 2023-05-18

## 2023-04-19 NOTE — TELEPHONE ENCOUNTER
No new care gaps identified.  [facility] Embedded Care Gaps. Reference number: [id number]. 4/18/2023   11:57:40 PM CDT

## 2023-04-27 ENCOUNTER — SPECIALTY PHARMACY (OUTPATIENT)
Dept: PHARMACY | Facility: CLINIC | Age: 58
End: 2023-04-27
Payer: COMMERCIAL

## 2023-04-27 NOTE — TELEPHONE ENCOUNTER
Specialty Pharmacy - Refill Coordination    Specialty Medication Orders Linked to Encounter      Flowsheet Row Most Recent Value   Medication #1 evolocumab (REPATHA SURECLICK) 140 mg/mL PnIj (Order#[id number], Rx#[id number])            Refill Questions - Documented Responses      Flowsheet Row Most Recent Value   Patient Availability and HIPAA Verification    Does patient want to proceed with activity? Yes   HIPAA/medical authority confirmed? Yes   Relationship to patient of person spoken to? Self   Refill Screening Questions    Would patient like to speak to a pharmacist? No   When does the patient need to receive the medication? 05/02/23   Refill Delivery Questions    How will the patient receive the medication? MEDRx   When does the patient need to receive the medication? 05/02/23   Shipping Address Home   Address in [facility] confirmed and updated if neccessary? Yes   Expected Copay ($) 5   Is the patient able to afford the medication copay? Yes   Payment Method CC on file   Days supply of Refill 28   Supplies needed? No supplies needed   Refill activity completed? Yes   Refill activity plan Refill scheduled   Shipment/Pickup Date: 05/01/23            Current Outpatient Medications   Medication Sig    anastrozole (ARIMIDEX) 1 mg Tab Take 1 tablet (1 mg total) by mouth once daily.    evolocumab (REPATHA SURECLICK) 140 mg/mL PnIj Inject 1 mL (140 mg total) into the skin every 14 (fourteen) days.    ezetimibe (ZETIA) 10 mg tablet Take 1 tablet (10 mg total) by mouth once daily.    HYDROcodone-acetaminophen (NORCO)  mg per tablet Take 1 tablet by mouth every 12 (twelve) hours as needed for Pain.    metoprolol succinate (TOPROL-XL) 100 MG 24 hr tablet Take 1 tablet (100 mg total) by mouth once daily.    multivitamin (THERAGRAN) per tablet Take 1 tablet by mouth once daily.    ondansetron (ZOFRAN-ODT) 4 MG TbDL 1-2 tablets PO every 6 hours as needed for nausea/vomiting while completing bowel prep     pantoprazole (PROTONIX) 40 MG tablet Take 1 tablet (40 mg total) by mouth daily as needed (gerd).    predniSONE (DELTASONE) 20 MG tablet Take 1 tablet (20 mg total) by mouth daily as needed (food allergies).    sod sulf-pot chloride-mag sulf (SUTAB) 1.479-0.188- 0.225 gram tablet Take 12 tablets by mouth once daily. Take according to package instructions with indicated amount of water.    tamsulosin (FLOMAX) 0.4 mg Cap Take 1 capsule (0.4 mg total) by mouth once daily.    zolpidem (AMBIEN) 10 mg Tab TAKE 1 TABLET BY MOUTH NIGHTLY AS NEEDED FOR INSOMNIA   Last reviewed on 4/13/2023 11:27 AM by Catia Escoto MA    Review of patient's allergies indicates:   Allergen Reactions    Milk Other (See Comments)    Wheat containing prod Hives, Rash, Shortness Of Breath and Swelling    Egg white     Gabapentin      Tachycardia, palpitations    Statins-hmg-coa reductase inhibitors Other (See Comments)    Adhesive Rash    Polyester fibers Rash    Last reviewed on  4/13/2023 11:27 AM by Catia Escoto      Tasks added this encounter   No tasks added.   Tasks due within next 3 months   4/25/2023 - Refill Coordination Outreach (1 time occurrence)     [facility]  [address]  [facility] [address]  Phone: [phone]  Fax: [phone]

## 2023-04-28 ENCOUNTER — ANESTHESIA EVENT (OUTPATIENT)
Dept: ENDOSCOPY | Facility: HOSPITAL | Age: 58
End: 2023-04-28
Payer: COMMERCIAL

## 2023-04-28 ENCOUNTER — HOSPITAL ENCOUNTER (OUTPATIENT)
Facility: HOSPITAL | Age: 58
Discharge: HOME | End: 2023-04-28
Attending: INTERNAL MEDICINE | Admitting: INTERNAL MEDICINE
Payer: COMMERCIAL

## 2023-04-28 ENCOUNTER — ANESTHESIA (OUTPATIENT)
Dept: ENDOSCOPY | Facility: HOSPITAL | Age: 58
End: 2023-04-28
Payer: COMMERCIAL

## 2023-04-28 DIAGNOSIS — R93.3 ABNORMAL CT SCAN, STOMACH: ICD-10-CM

## 2023-04-28 DIAGNOSIS — R10.9 RIGHT SIDED ABDOMINAL PAIN: Primary | ICD-10-CM

## 2023-04-28 DIAGNOSIS — Z86.010 PERSONAL HISTORY OF COLONIC POLYPS: ICD-10-CM

## 2023-04-28 PROBLEM — Z86.0100 PERSONAL HISTORY OF COLONIC POLYPS: Status: ACTIVE | Noted: 2023-04-28

## 2023-04-28 PROCEDURE — 43239 PR EGD, FLEX, W/BIOPSY, SGL/MULTI: ICD-10-PCS | Mod: 51,,, | Performed by: INTERNAL MEDICINE

## 2023-04-28 PROCEDURE — 45380 COLONOSCOPY AND BIOPSY: CPT | Mod: PT | Performed by: INTERNAL MEDICINE

## 2023-04-28 PROCEDURE — 37000008 HC ANESTHESIA 1ST 15 MINUTES: Performed by: INTERNAL MEDICINE

## 2023-04-28 PROCEDURE — 27201012 HC FORCEPS, HOT/COLD, DISP: Performed by: INTERNAL MEDICINE

## 2023-04-28 PROCEDURE — 25000003 PHARM REV CODE 250: Performed by: NURSE ANESTHETIST, CERTIFIED REGISTERED

## 2023-04-28 PROCEDURE — 88305 TISSUE EXAM BY PATHOLOGIST: CPT | Mod: 26,,, | Performed by: PATHOLOGY

## 2023-04-28 PROCEDURE — 45380 PR COLONOSCOPY,BIOPSY: ICD-10-PCS | Mod: 33,,, | Performed by: INTERNAL MEDICINE

## 2023-04-28 PROCEDURE — 43239 EGD BIOPSY SINGLE/MULTIPLE: CPT | Performed by: INTERNAL MEDICINE

## 2023-04-28 PROCEDURE — 45380 COLONOSCOPY AND BIOPSY: CPT | Mod: 33,,, | Performed by: INTERNAL MEDICINE

## 2023-04-28 PROCEDURE — 37000009 HC ANESTHESIA EA ADD 15 MINS: Performed by: INTERNAL MEDICINE

## 2023-04-28 PROCEDURE — 63600175 PHARM REV CODE 636 W HCPCS: Performed by: NURSE ANESTHETIST, CERTIFIED REGISTERED

## 2023-04-28 PROCEDURE — 88305 TISSUE EXAM BY PATHOLOGIST: CPT | Performed by: PATHOLOGY

## 2023-04-28 PROCEDURE — 43239 EGD BIOPSY SINGLE/MULTIPLE: CPT | Mod: 51,,, | Performed by: INTERNAL MEDICINE

## 2023-04-28 PROCEDURE — 88305 TISSUE EXAM BY PATHOLOGIST: ICD-10-PCS | Mod: 26,,, | Performed by: PATHOLOGY

## 2023-04-28 RX ORDER — PROPOFOL 10 MG/ML
VIAL (ML) INTRAVENOUS
Status: DISCONTINUED | OUTPATIENT
Start: 2023-04-28 | End: 2023-04-28

## 2023-04-28 RX ORDER — LIDOCAINE HYDROCHLORIDE 20 MG/ML
INJECTION INTRAVENOUS
Status: DISCONTINUED | OUTPATIENT
Start: 2023-04-28 | End: 2023-04-28

## 2023-04-28 RX ADMIN — PROPOFOL 30 MG: 10 INJECTION, EMULSION INTRAVENOUS at 01:04

## 2023-04-28 RX ADMIN — PROPOFOL 60 MG: 10 INJECTION, EMULSION INTRAVENOUS at 01:04

## 2023-04-28 RX ADMIN — LIDOCAINE HYDROCHLORIDE 100 MG: 20 INJECTION INTRAVENOUS at 01:04

## 2023-04-28 RX ADMIN — PROPOFOL 140 MG: 10 INJECTION, EMULSION INTRAVENOUS at 01:04

## 2023-04-28 RX ADMIN — SODIUM CHLORIDE, SODIUM LACTATE, POTASSIUM CHLORIDE, AND CALCIUM CHLORIDE: .6; .31; .03; .02 INJECTION, SOLUTION INTRAVENOUS at 01:04

## 2023-04-28 NOTE — ANESTHESIA POSTPROCEDURE EVALUATION
Anesthesia Post Evaluation    Patient: Harish Busby    Procedure(s) Performed: Procedure(s) (LRB):  EGD (ESOPHAGOGASTRODUODENOSCOPY) (N/A)  COLONOSCOPY (N/A)    Final Anesthesia Type: MAC      Patient location during evaluation: PACU  Patient participation: Yes- Able to Participate  Level of consciousness: awake and alert  Post-procedure vital signs: reviewed and stable  Pain management: adequate  Airway patency: patent    PONV status at discharge: No PONV  Anesthetic complications: no      Cardiovascular status: blood pressure returned to baseline  Respiratory status: unassisted  Hydration status: euvolemic  Follow-up not needed.          Vitals Value Taken Time   /66 04/28/23 1359   Temp 98 04/28/23 1359   Pulse 66 04/28/23 1359   Resp 12 04/28/23 1359   SpO2 98 04/28/23 1359         No case tracking events are documented in the log.      Pain/Leyda Score: No data recorded

## 2023-04-28 NOTE — ANESTHESIA PREPROCEDURE EVALUATION
04/28/2023  Harish Busby is a 57 y.o., male.      Pre-op Assessment    I have reviewed the Patient Summary Reports.     I have reviewed the Nursing Notes. I have reviewed the NPO Status.   I have reviewed the Medications.     Review of Systems  Anesthesia Hx:  No problems with previous Anesthesia    Social:  Former Smoker    Hematology/Oncology:  Hematology Normal   Oncology Normal     EENT/Dental:EENT/Dental Normal   Cardiovascular:   Hypertension, well controlled Dysrhythmias PVD hyperlipidemia    Pulmonary:  Pulmonary Normal    Renal/:   Chronic Renal Disease renal calculi    Hepatic/GI:   Bowel Prep. GERD, poorly controlled    Musculoskeletal:  Musculoskeletal Normal    Neurological:  Neurology Normal    Dermatological:  Skin Normal    Psych:  Psychiatric Normal           Physical Exam  General: Well nourished    Airway:  Mallampati: II   Mouth Opening: Normal  TM Distance: Normal  Tongue: Normal  Neck ROM: Normal ROM    Dental:  Intact    Chest/Lungs:  Clear to auscultation    Heart:  Rate: Normal        Anesthesia Plan  Type of Anesthesia, risks & benefits discussed:    Anesthesia Type: MAC  Intra-op Monitoring Plan: Standard ASA Monitors  Induction:  IV  Informed Consent: Informed consent signed with the Patient and all parties understand the risks and agree with anesthesia plan.  All questions answered. Patient consented to blood products? Yes  ASA Score: 3    Ready For Surgery From Anesthesia Perspective.     .

## 2023-04-28 NOTE — PROVATION PATIENT INSTRUCTIONS
Discharge Summary/Instructions after an Endoscopic Procedure  Patient Name: Harish Busby  Patient MRN: [id number]  Patient YOB: 1965 Friday, April 28, 2023 Brigid Gray MD  Dear patient,  As a result of recent federal legislation (The Federal Cures Act), you may   receive lab or pathology results from your procedure in your [facility]   account before your physician is able to contact you. Your physician or   their representative will relay the results to you with their   recommendations at their soonest availability.  Thank you,  RESTRICTIONS:  During your procedure today, you received medications for sedation.  These   medications may affect your judgment, balance and coordination.  Therefore,   for 24 hours, you have the following restrictions:   - DO NOT drive a car, operate machinery, make legal/financial decisions,   sign important papers or drink alcohol.    ACTIVITY:  Today: no heavy lifting, straining or running due to procedural   sedation/anesthesia.  The following day: return to full activity including work.  DIET:  Eat and drink normally unless instructed otherwise.     TREATMENT FOR COMMON SIDE EFFECTS:  - Mild abdominal pain, nausea, belching, bloating or excessive gas:  rest,   eat lightly and use a heating pad.  - Sore Throat: treat with throat lozenges and/or gargle with warm salt   water.  - Because air was used during the procedure, expelling large amounts of air   from your rectum or belching is normal.  - If a bowel prep was taken, you may not have a bowel movement for 1-3 days.    This is normal.  SYMPTOMS TO WATCH FOR AND REPORT TO YOUR PHYSICIAN:  1. Abdominal pain or bloating, other than gas cramps.  2. Chest pain.  3. Back pain.  4. Signs of infection such as: chills or fever occurring within 24 hours   after the procedure.  5. Rectal bleeding, which would show as bright red, maroon, or black stools.   (A tablespoon of blood from the rectum is not serious, especially  if   hemorrhoids are present.)  6. Vomiting.  7. Weakness or dizziness.  GO DIRECTLY TO THE NEAREST EMERGENCY ROOM IF YOU HAVE ANY OF THE FOLLOWING:      Difficulty breathing              Chills and/or fever over 101 F   Persistent vomiting and/or vomiting blood   Severe abdominal pain   Severe chest pain   Black, tarry stools   Bleeding- more than one tablespoon   Any other symptom or condition that you feel may need urgent attention  Your doctor recommends these additional instructions:  If any biopsies were taken, your doctors clinic will contact you in 1 to 2   weeks with any results.  - Discharge patient to home (via wheelchair).   - Resume previous diet.   - Continue present medications.   - Await pathology results.   - Repeat colonoscopy in 5 years for surveillance.   - Telephone GI clinic for pathology results in 2 weeks.   - Patient has a contact number available for emergencies.  The signs and   symptoms of potential delayed complications were discussed with the   patient.  Return to normal activities tomorrow.  Written discharge   instructions were provided to the patient.  For questions, problems or results please call your physician Brigid Gray MD at Work:  [phone]  If you have any questions about the above instructions, call the GI   department at [phone] or call the endoscopy unit at [phone]   from 7am until 3 pm.  [facility], EMERGENCY ROOM PHONE NUMBER:   [phone]  IF A COMPLICATION OR EMERGENCY SITUATION ARISES AND YOU ARE UNABLE TO REACH   YOUR PHYSICIAN - GO DIRECTLY TO THE EMERGENCY ROOM.  I have read or have had read to me these discharge instructions for my   procedure and have received a written copy.  I understand these   instructions and will follow-up with my physician if I have any questions.     __________________________________       _____________________________________  Nurse Signature                                           Patient/Designated   Responsible Party Signature  MD Brigid Renya MD  4/28/2023 1:57:30 PM  [employer]

## 2023-04-28 NOTE — PROVATION PATIENT INSTRUCTIONS
Discharge Summary/Instructions after an Endoscopic Procedure  Patient Name: Harish Busby  Patient MRN: [id number]  Patient YOB: 1965 Friday, April 28, 2023 Brigid Gray MD  Dear patient,  As a result of recent federal legislation (The Federal Cures Act), you may   receive lab or pathology results from your procedure in your [facility]   account before your physician is able to contact you. Your physician or   their representative will relay the results to you with their   recommendations at their soonest availability.  Thank you,  RESTRICTIONS:  During your procedure today, you received medications for sedation.  These   medications may affect your judgment, balance and coordination.  Therefore,   for 24 hours, you have the following restrictions:   - DO NOT drive a car, operate machinery, make legal/financial decisions,   sign important papers or drink alcohol.    ACTIVITY:  Today: no heavy lifting, straining or running due to procedural   sedation/anesthesia.  The following day: return to full activity including work.  DIET:  Eat and drink normally unless instructed otherwise.     TREATMENT FOR COMMON SIDE EFFECTS:  - Mild abdominal pain, nausea, belching, bloating or excessive gas:  rest,   eat lightly and use a heating pad.  - Sore Throat: treat with throat lozenges and/or gargle with warm salt   water.  - Because air was used during the procedure, expelling large amounts of air   from your rectum or belching is normal.  - If a bowel prep was taken, you may not have a bowel movement for 1-3 days.    This is normal.  SYMPTOMS TO WATCH FOR AND REPORT TO YOUR PHYSICIAN:  1. Abdominal pain or bloating, other than gas cramps.  2. Chest pain.  3. Back pain.  4. Signs of infection such as: chills or fever occurring within 24 hours   after the procedure.  5. Rectal bleeding, which would show as bright red, maroon, or black stools.   (A tablespoon of blood from the rectum is not serious, especially  if   hemorrhoids are present.)  6. Vomiting.  7. Weakness or dizziness.  GO DIRECTLY TO THE NEAREST EMERGENCY ROOM IF YOU HAVE ANY OF THE FOLLOWING:      Difficulty breathing              Chills and/or fever over 101 F   Persistent vomiting and/or vomiting blood   Severe abdominal pain   Severe chest pain   Black, tarry stools   Bleeding- more than one tablespoon   Any other symptom or condition that you feel may need urgent attention  Your doctor recommends these additional instructions:  If any biopsies were taken, your doctors clinic will contact you in 1 to 2   weeks with any results.  - Discharge patient to home (via wheelchair).   - Resume previous diet.   - Continue present medications.   - Await pathology results.   - Telephone GI clinic for pathology results in 2 weeks.   - Patient has a contact number available for emergencies.  The signs and   symptoms of potential delayed complications were discussed with the   patient.  Return to normal activities tomorrow.  Written discharge   instructions were provided to the patient.  For questions, problems or results please call your physician Brigid Gray MD at Work:  [phone]  If you have any questions about the above instructions, call the GI   department at [phone] or call the endoscopy unit at [phone]   from 7am until 3 pm.  [facility], EMERGENCY ROOM PHONE NUMBER:   [phone]  IF A COMPLICATION OR EMERGENCY SITUATION ARISES AND YOU ARE UNABLE TO REACH   YOUR PHYSICIAN - GO DIRECTLY TO THE EMERGENCY ROOM.  I have read or have had read to me these discharge instructions for my   procedure and have received a written copy.  I understand these   instructions and will follow-up with my physician if I have any questions.     __________________________________       _____________________________________  Nurse Signature                                          Patient/Designated   Responsible Party  Signature  MD Brigid Reyna MD  4/28/2023 1:59:09 PM  [employer]

## 2023-04-28 NOTE — H&P
Short Stay Endoscopy History and Physical    PCP - Kaila West MD    Procedure - EGD and colonoscopy  ASA - 2  Mallampati - per anesthesia  History of Anesthesia problems - no  Family history Anesthesia problems -  no     HPI:  This is a 57 y.o. male here for evaluation of :   Active Hospital Problems    Diagnosis  POA    *Right sided abdominal pain [R10.9]  No    Abnormal CT scan, stomach [R93.3]  Yes    Personal history of colonic polyps [Z86.010]  Not Applicable      Resolved Hospital Problems   No resolved problems to display.         Health Maintenance         Date Due Completion Date    Sign Pain Contract Never done ---    Complete Opioid Risk Tool Never done ---    High Dose Statin Never done ---    Colorectal Cancer Screening Never done ---    Shingles Vaccine (1 of 2) Never done ---    COVID-19 Vaccine (2 - Booster for Constantin series) 05/06/2021 3/11/2021    Influenza Vaccine (1) 09/01/2022 10/13/2021    TETANUS VACCINE 08/07/2023 8/7/2013    Hemoglobin A1c (Diabetic Prevention Screening) 02/02/2024 2/2/2021    Lipid Panel 03/21/2027 3/21/2022    Abdominal Aortic Aneurysm Screening 07/05/2029 11/30/2022              ROS:  CONSTITUTIONAL: Denies weight change,  fatigue, fevers, chills, night sweats.  CARDIOVASCULAR: Denies chest pain, shortness of breath, orthopnea and edema.  RESPIRATORY: Denies cough, hemoptysis, dyspnea, and wheezing.  GI: See HPI.    Medical History:   Past Medical History:   Diagnosis Date    Hyperlipidemia     Hypertension     NCGS (non-celiac gluten sensitivity)     Polycythemia     Preop cardiovascular exam 11/30/2022    Statin intolerance     Supraventricular tachycardia     SVT (supraventricular tachycardia)        Surgical History:   Past Surgical History:   Procedure Laterality Date    DENTAL SURGERY      dental implant    FINGER SURGERY         Family History:   Family History   Problem Relation Age of Onset    Hypertension Mother     Cancer Father         cancer -  leukemia    Cancer Brother         lung       Social History:   Social History     Tobacco Use    Smoking status: Former     Packs/day: 1.00     Years: 5.00     Pack years: 5.00     Types: Cigarettes    Smokeless tobacco: Never    Tobacco comments:     Quit 19 yo   Substance Use Topics    Alcohol use: Not Currently    Drug use: Not Currently       Allergies:   Review of patient's allergies indicates:   Allergen Reactions    Milk Other (See Comments)    Wheat containing prod Hives, Rash, Shortness Of Breath and Swelling    Egg white     Gabapentin      Tachycardia, palpitations    Statins-hmg-coa reductase inhibitors Other (See Comments)    Adhesive Rash    Polyester fibers Rash       Medications:   No current facility-administered medications on file prior to encounter.     Current Outpatient Medications on File Prior to Encounter   Medication Sig Dispense Refill    anastrozole (ARIMIDEX) 1 mg Tab Take 1 tablet (1 mg total) by mouth once daily. 90 tablet 3    evolocumab (REPATHA SURECLICK) 140 mg/mL PnIj Inject 1 mL (140 mg total) into the skin every 14 (fourteen) days. 6 mL 4    ezetimibe (ZETIA) 10 mg tablet Take 1 tablet (10 mg total) by mouth once daily. 90 tablet 3    metoprolol succinate (TOPROL-XL) 100 MG 24 hr tablet Take 1 tablet (100 mg total) by mouth once daily. 90 tablet 0    multivitamin (THERAGRAN) per tablet Take 1 tablet by mouth once daily.      ondansetron (ZOFRAN-ODT) 4 MG TbDL 1-2 tablets PO every 6 hours as needed for nausea/vomiting while completing bowel prep 6 tablet 0    pantoprazole (PROTONIX) 40 MG tablet Take 1 tablet (40 mg total) by mouth daily as needed (gerd). 90 tablet 0    predniSONE (DELTASONE) 20 MG tablet Take 1 tablet (20 mg total) by mouth daily as needed (food allergies). 90 tablet 0    sod sulf-pot chloride-mag sulf (SUTAB) 1.479-0.188- 0.225 gram tablet Take 12 tablets by mouth once daily. Take according to package instructions with indicated amount of water. 24 tablet 0     tamsulosin (FLOMAX) 0.4 mg Cap Take 1 capsule (0.4 mg total) by mouth once daily. 90 capsule 3       Physical Exam:  Vital Signs: There were no vitals filed for this visit.  General Appearance: Well appearing in no acute distress  ENT: OP clear  Chest: CTA B  CV: RRR, no m/r/g  Abd: s/nt/nd/nabs  Ext: no edema    Labs:Reviewed    IMP:  Active Hospital Problems    Diagnosis  POA    *Right sided abdominal pain [R10.9]  No    Abnormal CT scan, stomach [R93.3]  Yes    Personal history of colonic polyps [Z86.010]  Not Applicable      Resolved Hospital Problems   No resolved problems to display.         Plan:   I have explained the risks and benefits of upper endoscopy and colonoscopy to the patient including but not limited to bleeding, perforation, infection, and death. The patient wishes to proceed.

## 2023-04-28 NOTE — ANESTHESIA RELEASE NOTE
"Anesthesia Release from PACU Note    Patient: Harish Busby    Procedure(s) Performed: Procedure(s) (LRB):  EGD (ESOPHAGOGASTRODUODENOSCOPY) (N/A)  COLONOSCOPY (N/A)    Anesthesia type: MAC    Post pain: Adequate analgesia    Post assessment: no apparent anesthetic complications    Last Vitals:   Visit Vitals  BP (!) 144/98   Pulse 90   Temp 36.8 °C (98.2 °F)   Resp 16   Ht 5' 10" (1.778 m)   Wt 79.4 kg (175 lb)   SpO2 99%   BMI 25.11 kg/m²       Post vital signs: stable    Level of consciousness: awake    Nausea/Vomiting: no nausea/no vomiting    Complications: none    Airway Patency: patent    Respiratory: unassisted    Cardiovascular: stable and blood pressure at baseline    Hydration: euvolemic  "

## 2023-04-28 NOTE — DISCHARGE SUMMARY
[facility] Endoscopy (Hospital)  Discharge Note  Short Stay    Procedure(s) (LRB):  EGD (ESOPHAGOGASTRODUODENOSCOPY) (N/A)  COLONOSCOPY (N/A)      OUTCOME: Patient tolerated treatment/procedure well without complication and is now ready for discharge.    DISPOSITION: Home or Self Care    FINAL DIAGNOSIS:  Right sided abdominal pain    FOLLOWUP: With primary care provider    DISCHARGE INSTRUCTIONS:  No discharge procedures on file.      Clinical Reference Documents Added to Patient Instructions         Document    COLON POLYPECTOMY (ENGLISH)    DIVERTICULOSIS DISCHARGE INSTRUCTIONS (ENGLISH)    GASTRITIS ED (ENGLISH)

## 2023-04-28 NOTE — TRANSFER OF CARE
"Anesthesia Transfer of Care Note    Patient: Harish Busby    Procedure(s) Performed: Procedure(s) (LRB):  EGD (ESOPHAGOGASTRODUODENOSCOPY) (N/A)  COLONOSCOPY (N/A)    Patient location: PACU    Anesthesia Type: MAC    Transport from OR: Transported from OR on room air with adequate spontaneous ventilation    Post pain: adequate analgesia    Post assessment: no apparent anesthetic complications    Post vital signs: stable    Level of consciousness: awake    Nausea/Vomiting: no nausea/vomiting    Complications: none    Transfer of care protocol was followed      Last vitals:   Visit Vitals  BP (!) 144/98   Pulse 90   Temp 36.8 °C (98.2 °F)   Resp 16   Ht 5' 10" (1.778 m)   Wt 79.4 kg (175 lb)   SpO2 99%   BMI 25.11 kg/m²     "

## 2023-05-01 VITALS
BODY MASS INDEX: 25.05 KG/M2 | HEART RATE: 77 BPM | HEIGHT: 70 IN | WEIGHT: 175 LBS | RESPIRATION RATE: 18 BRPM | OXYGEN SATURATION: 98 % | TEMPERATURE: 98 F | SYSTOLIC BLOOD PRESSURE: 130 MMHG | DIASTOLIC BLOOD PRESSURE: 88 MMHG

## 2023-05-04 LAB
FINAL PATHOLOGIC DIAGNOSIS: NORMAL
Lab: NORMAL

## 2023-05-16 DIAGNOSIS — Z76.0 MEDICATION REFILL: ICD-10-CM

## 2023-05-17 NOTE — TELEPHONE ENCOUNTER
Care Due:                  Date            Visit Type   Department     Provider  --------------------------------------------------------------------------------                                EP -                              PRIMARY      [facility]  Last Visit: 12-      CARE ([facility])   MEDICINE       Alexander Jones  Next Visit: None Scheduled  None         None Found                                                            Last  Test          Frequency    Reason                     Performed    Due Date  --------------------------------------------------------------------------------    Lipid Panel.  12 months..  ezetimibe................  03- 03-    [facility] Embedded Care Due Messages. Reference number: [id number].   5/16/2023 9:42:31 PM CDT

## 2023-05-17 NOTE — TELEPHONE ENCOUNTER
LAST VISIT:4/13/23    LAST FILLED: 3/12/23  metoprolol succinate (TOPROL-XL) 100 MG 24 hr tablet      LAST  VISIT: 4/13/23  LAST FILLED: 2/22/23  pantoprazole (PROTONIX) 40 MG tablet      LAST VISIT:4/13/23    LAST FILLED: 2/22/23  predniSONE (DELTASONE) 20 MG tablet

## 2023-05-18 RX ORDER — ZOLPIDEM TARTRATE 10 MG/1
TABLET ORAL
Qty: 30 TABLET | Refills: 0 | Status: SHIPPED | OUTPATIENT
Start: 2023-05-18 | End: 2023-06-16 | Stop reason: SDUPTHER

## 2023-05-18 RX ORDER — PANTOPRAZOLE SODIUM 40 MG/1
TABLET, DELAYED RELEASE ORAL
Qty: 90 TABLET | Refills: 0 | Status: SHIPPED | OUTPATIENT
Start: 2023-05-18 | End: 2024-03-18

## 2023-05-18 RX ORDER — METOPROLOL SUCCINATE 100 MG/1
TABLET, EXTENDED RELEASE ORAL
Qty: 90 TABLET | Refills: 0 | OUTPATIENT
Start: 2023-05-18

## 2023-05-18 RX ORDER — PREDNISONE 20 MG/1
TABLET ORAL
Qty: 90 TABLET | Refills: 0 | OUTPATIENT
Start: 2023-05-18

## 2023-05-19 ENCOUNTER — OFFICE VISIT (OUTPATIENT)
Dept: FAMILY MEDICINE | Facility: CLINIC | Age: 58
End: 2023-05-19
Payer: COMMERCIAL

## 2023-05-19 VITALS
SYSTOLIC BLOOD PRESSURE: 136 MMHG | DIASTOLIC BLOOD PRESSURE: 100 MMHG | BODY MASS INDEX: 26.49 KG/M2 | HEART RATE: 98 BPM | WEIGHT: 185.06 LBS | HEIGHT: 70 IN | TEMPERATURE: 98 F | OXYGEN SATURATION: 98 %

## 2023-05-19 DIAGNOSIS — I10 HYPERTENSION, UNSPECIFIED TYPE: ICD-10-CM

## 2023-05-19 DIAGNOSIS — Z76.0 MEDICATION REFILL: ICD-10-CM

## 2023-05-19 DIAGNOSIS — D75.1 POLYCYTHEMIA: ICD-10-CM

## 2023-05-19 DIAGNOSIS — R00.0 TACHYCARDIA: ICD-10-CM

## 2023-05-19 DIAGNOSIS — Z79.899 LONG TERM USE OF DRUG: ICD-10-CM

## 2023-05-19 DIAGNOSIS — E78.5 HYPERLIPIDEMIA, UNSPECIFIED HYPERLIPIDEMIA TYPE: ICD-10-CM

## 2023-05-19 DIAGNOSIS — Z88.8 ALLERGY TO STATIN MEDICATION: ICD-10-CM

## 2023-05-19 DIAGNOSIS — B02.29 PHN (POSTHERPETIC NEURALGIA): Primary | ICD-10-CM

## 2023-05-19 DIAGNOSIS — N28.1 KIDNEY CYSTS: ICD-10-CM

## 2023-05-19 PROCEDURE — 1159F MED LIST DOCD IN RCRD: CPT | Mod: CPTII,S$GLB,, | Performed by: FAMILY MEDICINE

## 2023-05-19 PROCEDURE — 3008F BODY MASS INDEX DOCD: CPT | Mod: CPTII,S$GLB,, | Performed by: FAMILY MEDICINE

## 2023-05-19 PROCEDURE — 99214 OFFICE O/P EST MOD 30 MIN: CPT | Mod: S$GLB,,, | Performed by: FAMILY MEDICINE

## 2023-05-19 PROCEDURE — 1160F RVW MEDS BY RX/DR IN RCRD: CPT | Mod: CPTII,S$GLB,, | Performed by: FAMILY MEDICINE

## 2023-05-19 PROCEDURE — 3080F PR MOST RECENT DIASTOLIC BLOOD PRESSURE >= 90 MM HG: ICD-10-PCS | Mod: CPTII,S$GLB,, | Performed by: FAMILY MEDICINE

## 2023-05-19 PROCEDURE — 3080F DIAST BP >= 90 MM HG: CPT | Mod: CPTII,S$GLB,, | Performed by: FAMILY MEDICINE

## 2023-05-19 PROCEDURE — 99999 PR PBB SHADOW E&M-EST. PATIENT-LVL IV: CPT | Mod: PBBFAC,,, | Performed by: FAMILY MEDICINE

## 2023-05-19 PROCEDURE — 1160F PR REVIEW ALL MEDS BY PRESCRIBER/CLIN PHARMACIST DOCUMENTED: ICD-10-PCS | Mod: CPTII,S$GLB,, | Performed by: FAMILY MEDICINE

## 2023-05-19 PROCEDURE — 3075F SYST BP GE 130 - 139MM HG: CPT | Mod: CPTII,S$GLB,, | Performed by: FAMILY MEDICINE

## 2023-05-19 PROCEDURE — 3075F PR MOST RECENT SYSTOLIC BLOOD PRESS GE 130-139MM HG: ICD-10-PCS | Mod: CPTII,S$GLB,, | Performed by: FAMILY MEDICINE

## 2023-05-19 PROCEDURE — 99214 PR OFFICE/OUTPT VISIT, EST, LEVL IV, 30-39 MIN: ICD-10-PCS | Mod: S$GLB,,, | Performed by: FAMILY MEDICINE

## 2023-05-19 PROCEDURE — 99999 PR PBB SHADOW E&M-EST. PATIENT-LVL IV: ICD-10-PCS | Mod: PBBFAC,,, | Performed by: FAMILY MEDICINE

## 2023-05-19 PROCEDURE — 4010F PR ACE/ARB THEARPY RXD/TAKEN: ICD-10-PCS | Mod: CPTII,S$GLB,, | Performed by: FAMILY MEDICINE

## 2023-05-19 PROCEDURE — 4010F ACE/ARB THERAPY RXD/TAKEN: CPT | Mod: CPTII,S$GLB,, | Performed by: FAMILY MEDICINE

## 2023-05-19 PROCEDURE — 1159F PR MEDICATION LIST DOCUMENTED IN MEDICAL RECORD: ICD-10-PCS | Mod: CPTII,S$GLB,, | Performed by: FAMILY MEDICINE

## 2023-05-19 PROCEDURE — 3008F PR BODY MASS INDEX (BMI) DOCUMENTED: ICD-10-PCS | Mod: CPTII,S$GLB,, | Performed by: FAMILY MEDICINE

## 2023-05-19 RX ORDER — PREDNISONE 20 MG/1
20 TABLET ORAL DAILY PRN
Qty: 90 TABLET | Refills: 0 | Status: SHIPPED | OUTPATIENT
Start: 2023-05-19 | End: 2024-03-18 | Stop reason: SDUPTHER

## 2023-05-19 RX ORDER — NORTRIPTYLINE HYDROCHLORIDE 25 MG/1
50 CAPSULE ORAL 2 TIMES DAILY
Qty: 120 CAPSULE | Refills: 2 | Status: SHIPPED | OUTPATIENT
Start: 2023-05-19 | End: 2023-08-11

## 2023-05-19 RX ORDER — NORTRIPTYLINE HYDROCHLORIDE 25 MG/1
25 CAPSULE ORAL 2 TIMES DAILY
COMMUNITY
End: 2023-08-11

## 2023-05-19 RX ORDER — GABAPENTIN 100 MG/1
200 CAPSULE ORAL NIGHTLY
COMMUNITY
End: 2023-06-16 | Stop reason: SDUPTHER

## 2023-05-19 RX ORDER — LOSARTAN POTASSIUM 25 MG/1
25 TABLET ORAL DAILY
Qty: 30 TABLET | Refills: 2 | Status: SHIPPED | OUTPATIENT
Start: 2023-05-19 | End: 2023-06-23 | Stop reason: SDUPTHER

## 2023-05-19 NOTE — PROGRESS NOTES
Subjective:      Patient ID: Harish Busby is a 57 y.o. male.    Chief Complaint: Follow-up    HPI    Patient with pmhx of polycythemia, HLD, HTN, SVT, non celiac gluten sensitivity, dairy sensitivity, insomnia here today to establish care. Had bout of shingles in January - rash resolved but still having pain due to it. Using gabapentin and nortriptyline. Has been diagnosed with Abdominal descending dissecting aorta. Following Dr. Sam and Dr. Whitman. Placed on BB, cholesterol injection as well. Being monitored for this every 6 months. Possible AAA but second opinion different feel that this was inaccurate. Also had to establish with Dr. Ortiz for allergies - due to allergy to device used for dissecting aorta. Taking daily 20 mg of prednisone right now due to shingles flare and allergy flare. Also taking daily nortriptyline for shingles pain which is helping.Also notes that when he took potassium, magnesium and B12 felt much better and his pulse came back down to normal. Unclear if now deficient in these     HPI 9/2022  Taking prednisone for sensitivities - 20 mg as needed. History of elevated estrogen in the past. 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 Recent cbc in 6/2022 - nml.      Was seeing Dr. Orourke - endocrinology   [facility] - SVT - metoprolol.      Unable to tolerate statin medications - memory loss - was on injection but held due to interfering with testosterone/allergy he stopped    Past Medical History:   Diagnosis Date    Hyperlipidemia     Hypertension     NCGS (non-celiac gluten sensitivity)     Polycythemia     Preop cardiovascular exam 11/30/2022    Statin intolerance     Supraventricular tachycardia     SVT (supraventricular tachycardia)        Past Surgical History:   Procedure Laterality Date    COLONOSCOPY N/A 4/28/2023    Procedure: COLONOSCOPY;  Surgeon: Brigid LONG  MD Isaac;  Location: [facility] ENDO;  Service: Endoscopy;  Laterality: N/A;    DENTAL SURGERY      dental implant    ESOPHAGOGASTRODUODENOSCOPY N/A 4/28/2023    Procedure: EGD (ESOPHAGOGASTRODUODENOSCOPY);  Surgeon: Brigid Gray MD;  Location: [facility] ENDO;  Service: Endoscopy;  Laterality: N/A;    FINGER SURGERY         Family History   Problem Relation Age of Onset    Hypertension Mother     Cancer Father         cancer - leukemia    Cancer Brother         lung       Social History     Socioeconomic History    Marital status:    Occupational History    Occupation: [employer]   Tobacco Use    Smoking status: Former     Packs/day: 1.00     Years: 5.00     Pack years: 5.00     Types: Cigarettes    Smokeless tobacco: Never    Tobacco comments:     Quit 19 yo   Substance and Sexual Activity    Alcohol use: Not Currently    Drug use: Not Currently    Sexual activity: Not Currently       Health Maintenance Topics with due status: Not Due       Topic Last Completion Date    TETANUS VACCINE 08/07/2013    Hemoglobin A1c (Diabetic Prevention Screening) 02/02/2021    Influenza Vaccine 10/13/2021    Lipid Panel 03/21/2022    Colorectal Cancer Screening 04/28/2023       Medication List with Changes/Refills   New Medications    LOSARTAN (COZAAR) 25 MG TABLET    Take 1 tablet (25 mg total) by mouth once daily.    NORTRIPTYLINE (PAMELOR) 25 MG CAPSULE    Take 2 capsules (50 mg total) by mouth 2 (two) times daily.   Current Medications    ANASTROZOLE (ARIMIDEX) 1 MG TAB    Take 1 tablet (1 mg total) by mouth once daily.    EVOLOCUMAB (REPATHA SURECLICK) 140 MG/ML PNIJ    Inject 1 mL (140 mg total) into the skin every 14 (fourteen) days.    EZETIMIBE (ZETIA) 10 MG TABLET    Take 1 tablet (10 mg total) by mouth once daily.    GABAPENTIN (NEURONTIN) 100 MG CAPSULE    Take 200 mg by mouth every evening. Shingles pain    HYDROCODONE-ACETAMINOPHEN (NORCO)  MG PER TABLET    Take 1 tablet by mouth every 12 (twelve)  hours as needed for Pain.    METOPROLOL SUCCINATE (TOPROL-XL) 100 MG 24 HR TABLET    Take 1 tablet (100 mg total) by mouth once daily.    MULTIVITAMIN (THERAGRAN) PER TABLET    Take 1 tablet by mouth once daily.    NORTRIPTYLINE (PAMELOR) 25 MG CAPSULE    Take 25 mg by mouth 2 (two) times a day.    ONDANSETRON (ZOFRAN-ODT) 4 MG TBDL    1-2 tablets PO every 6 hours as needed for nausea/vomiting while completing bowel prep    PANTOPRAZOLE (PROTONIX) 40 MG TABLET    TAKE 1 TABLET BY MOUTH ONCE DAILY AS NEEDED    TAMSULOSIN (FLOMAX) 0.4 MG CAP    Take 1 capsule (0.4 mg total) by mouth once daily.    ZOLPIDEM (AMBIEN) 10 MG TAB    TAKE 1 TABLET BY MOUTH NIGHTLY AS NEEDED FOR INSOMNIA   Changed and/or Refilled Medications    Modified Medication Previous Medication    PREDNISONE (DELTASONE) 20 MG TABLET predniSONE (DELTASONE) 20 MG tablet       Take 1 tablet (20 mg total) by mouth daily as needed (food allergies).    Take 1 tablet (20 mg total) by mouth daily as needed (food allergies).       Review of patient's allergies indicates:   Allergen Reactions    Milk Other (See Comments)    Wheat containing prod Hives, Rash, Shortness Of Breath and Swelling    Egg white     Gabapentin      Tachycardia, palpitations    Statins-hmg-coa reductase inhibitors Other (See Comments)    Adhesive Rash    Polyester fibers Rash       Review of Systems   Constitutional:  Negative for fever.        Allergies   HENT:  Negative for congestion and hearing loss.    Eyes:  Negative for blurred vision and discharge.   Respiratory:  Negative for shortness of breath and wheezing.    Cardiovascular:  Negative for chest pain, palpitations and leg swelling.        Tachycardia   Gastrointestinal:  Negative for abdominal pain, blood in stool, constipation, diarrhea and vomiting.   Genitourinary:  Negative for dysuria, hematuria and urgency.   Musculoskeletal:  Negative for neck pain.   Skin:  Negative for rash.   Neurological:  Positive for weakness.  Negative for headaches.   Endo/Heme/Allergies:  Negative for polydipsia.     Objective:     Vitals:    05/19/23 0843   BP: (!) 136/100   Pulse: 98   Temp: 97.5 °F (36.4 °C)     Body mass index is 26.56 kg/m².    Physical Exam  Vitals and nursing note reviewed.   Constitutional:       General: He is not in acute distress.     Appearance: He is well-developed.   HENT:      Head: Normocephalic and atraumatic.      Right Ear: External ear normal.      Left Ear: External ear normal.      Nose: Nose normal.   Eyes:      Conjunctiva/sclera: Conjunctivae normal.      Pupils: Pupils are equal, round, and reactive to light.   Neck:      Thyroid: No thyromegaly.   Cardiovascular:      Rate and Rhythm: Normal rate and regular rhythm.      Heart sounds: Normal heart sounds. No murmur heard.  Pulmonary:      Effort: Pulmonary effort is normal. No respiratory distress.      Breath sounds: Normal breath sounds. No wheezing or rales.   Chest:      Chest wall: No tenderness.   Abdominal:      General: Bowel sounds are normal. There is no distension.      Palpations: Abdomen is soft.      Tenderness: There is no abdominal tenderness.   Lymphadenopathy:      Cervical: No cervical adenopathy.   Skin:     General: Skin is warm and dry.   Neurological:      Mental Status: He is alert and oriented to person, place, and time.       Assessment and Plan:     PHN (postherpetic neuralgia)    Allergy to statin medication    Tachycardia    Medication refill  -     predniSONE (DELTASONE) 20 MG tablet; Take 1 tablet (20 mg total) by mouth daily as needed (food allergies).  Dispense: 90 tablet; Refill: 0    Kidney cysts    Polycythemia    Long term use of drug  -     Lipid Panel; Future; Expected date: 05/19/2023  -     PSA, Screening; Future; Expected date: 05/19/2023  -     Hemoglobin A1C; Future; Expected date: 05/19/2023  -     Vitamin B12; Future; Expected date: 05/19/2023  -     Folate; Future; Expected date: 05/19/2023  -     Magnesium;  Future; Expected date: 05/19/2023  -     Comprehensive Metabolic Panel; Future; Expected date: 05/19/2023    Hyperlipidemia, unspecified hyperlipidemia type  -     Lipid Panel; Future; Expected date: 05/19/2023    Hypertension, unspecified type  -     losartan (COZAAR) 25 MG tablet; Take 1 tablet (25 mg total) by mouth once daily.  Dispense: 30 tablet; Refill: 2    Other orders  -     nortriptyline (PAMELOR) 25 MG capsule; Take 2 capsules (50 mg total) by mouth 2 (two) times daily.  Dispense: 120 capsule; Refill: 2    Increase pamelor for PHN  Add losartan for HTN   Will refill steroid but advised to start tapering down dose from 20 mg daily to 10. See if able to handle decreasing down to 5 mg as well. Will follow up with allergy for further prescription/treatment of allergy symptoms causing him to need the daily prednisone - Dr. Ortiz  Above labs  Following cardiology and vascular   Repatha - cholesterol medication     Follow up in about 4 weeks (around 6/16/2023) for follow up .      Answers submitted by the patient for this visit:  Review of Systems Questionnaire (Submitted on 5/18/2023)  activity change: Yes  unexpected weight change: No  rhinorrhea: No  trouble swallowing: No  visual disturbance: Yes  chest tightness: No  polyuria: No  difficulty urinating: No  joint swelling: No  arthralgias: No  confusion: No  dysphoric mood: No

## 2023-05-23 ENCOUNTER — SPECIALTY PHARMACY (OUTPATIENT)
Dept: PHARMACY | Facility: CLINIC | Age: 58
End: 2023-05-23
Payer: COMMERCIAL

## 2023-05-23 NOTE — TELEPHONE ENCOUNTER
Pt wants to hold off on his repatha due to allergies. Patient requested a call back next month. Attempted to transfer the call to a [facility] but the call was disconnected. Routing to [facility]

## 2023-05-28 LAB
ALBUMIN SERPL-MCNC: 3.9 G/DL (ref 3.6–5.1)
ALBUMIN/GLOB SERPL: 1.9 (CALC) (ref 1–2.5)
ALP SERPL-CCNC: 42 U/L (ref 35–144)
ALT SERPL-CCNC: 26 U/L (ref 9–46)
AST SERPL-CCNC: 16 U/L (ref 10–35)
BILIRUB SERPL-MCNC: 0.7 MG/DL (ref 0.2–1.2)
BUN SERPL-MCNC: 20 MG/DL (ref 7–25)
BUN/CREAT SERPL: ABNORMAL (CALC) (ref 6–22)
CALCIUM SERPL-MCNC: 9.3 MG/DL (ref 8.6–10.3)
CHLORIDE SERPL-SCNC: 102 MMOL/L (ref 98–110)
CHOLEST SERPL-MCNC: 270 MG/DL
CHOLEST/HDLC SERPL: 5.4 (CALC)
CO2 SERPL-SCNC: 30 MMOL/L (ref 20–32)
CREAT SERPL-MCNC: 1.26 MG/DL (ref 0.7–1.3)
EGFR: 66 ML/MIN/1.73M2
FOLATE SERPL-MCNC: 16.8 NG/ML
GLOBULIN SER CALC-MCNC: 2.1 G/DL (CALC) (ref 1.9–3.7)
GLUCOSE SERPL-MCNC: 75 MG/DL (ref 65–99)
HBA1C MFR BLD: 5.6 % OF TOTAL HGB
HDLC SERPL-MCNC: 50 MG/DL
LDLC SERPL CALC-MCNC: 183 MG/DL (CALC)
MAGNESIUM SERPL-MCNC: 2 MG/DL (ref 1.5–2.5)
NONHDLC SERPL-MCNC: 220 MG/DL (CALC)
POTASSIUM SERPL-SCNC: 4.3 MMOL/L (ref 3.5–5.3)
PROT SERPL-MCNC: 6 G/DL (ref 6.1–8.1)
PSA SERPL-MCNC: 1.54 NG/ML
SODIUM SERPL-SCNC: 139 MMOL/L (ref 135–146)
TRIGL SERPL-MCNC: 193 MG/DL
VIT B12 SERPL-MCNC: 391 PG/ML (ref 200–1100)

## 2023-05-30 NOTE — TELEPHONE ENCOUNTER
Specialty Pharmacy - Refill Coordination    Specialty Medication Orders Linked to Encounter      Flowsheet Row Most Recent Value   Medication #1 evolocumab (REPATHA SURECLICK) 140 mg/mL PnIj (Order#[id number], Rx#[id number])            Refill Questions - Documented Responses      Flowsheet Row Most Recent Value   Patient Availability and HIPAA Verification    Does patient want to proceed with activity? Yes   HIPAA/medical authority confirmed? Yes   Relationship to patient of person spoken to? Self   Refill Screening Questions    Would patient like to speak to a pharmacist? No   When does the patient need to receive the medication? 06/01/23   Refill Delivery Questions    How will the patient receive the medication? [facility]   When does the patient need to receive the medication? 06/01/23   Shipping Address Home   Address in [facility] confirmed and updated if neccessary? Yes   Expected Copay ($) 5   Is the patient able to afford the medication copay? Yes   Payment Method CC on file   Days supply of Refill 28   Supplies needed? No supplies needed   Refill activity completed? Yes   Refill activity plan Refill scheduled   Shipment/Pickup Date: 05/31/23            Current Outpatient Medications   Medication Sig    anastrozole (ARIMIDEX) 1 mg Tab Take 1 tablet (1 mg total) by mouth once daily.    evolocumab (REPATHA SURECLICK) 140 mg/mL PnIj Inject 1 mL (140 mg total) into the skin every 14 (fourteen) days.    ezetimibe (ZETIA) 10 mg tablet Take 1 tablet (10 mg total) by mouth once daily.    gabapentin (NEURONTIN) 100 MG capsule Take 200 mg by mouth every evening. Shingles pain    HYDROcodone-acetaminophen (NORCO)  mg per tablet Take 1 tablet by mouth every 12 (twelve) hours as needed for Pain. (Patient not taking: Reported on 5/19/2023)    losartan (COZAAR) 25 MG tablet Take 1 tablet (25 mg total) by mouth once daily.    metoprolol succinate (TOPROL-XL) 100 MG 24 hr tablet Take 1 tablet (100 mg total) by mouth  once daily.    multivitamin (THERAGRAN) per tablet Take 1 tablet by mouth once daily.    nortriptyline (PAMELOR) 25 MG capsule Take 25 mg by mouth 2 (two) times a day.    nortriptyline (PAMELOR) 25 MG capsule Take 2 capsules (50 mg total) by mouth 2 (two) times daily.    ondansetron (ZOFRAN-ODT) 4 MG TbDL 1-2 tablets PO every 6 hours as needed for nausea/vomiting while completing bowel prep (Patient not taking: Reported on 5/19/2023)    pantoprazole (PROTONIX) 40 MG tablet TAKE 1 TABLET BY MOUTH ONCE DAILY AS NEEDED    predniSONE (DELTASONE) 20 MG tablet Take 1 tablet (20 mg total) by mouth daily as needed (food allergies).    tamsulosin (FLOMAX) 0.4 mg Cap Take 1 capsule (0.4 mg total) by mouth once daily.    zolpidem (AMBIEN) 10 mg Tab TAKE 1 TABLET BY MOUTH NIGHTLY AS NEEDED FOR INSOMNIA   Last reviewed on 5/19/2023  9:20 AM by Kaila West MD    Review of patient's allergies indicates:   Allergen Reactions    Milk Other (See Comments)    Wheat containing prod Hives, Rash, Shortness Of Breath and Swelling    Egg white     Gabapentin      Tachycardia, palpitations    Statins-hmg-coa reductase inhibitors Other (See Comments)    Adhesive Rash    Polyester fibers Rash    Last reviewed on  5/19/2023 9:20 AM by Kaila West      Tasks added this encounter   No tasks added.   Tasks due within next 3 months   5/30/2023 - Refill Coordination Outreach (1 time occurrence)     Yulia Orourke, PeteyD  [facility]  [address] [place] [id number]  Phone: [phone]  Fax: [phone]

## 2023-06-16 RX ORDER — ZOLPIDEM TARTRATE 10 MG/1
10 TABLET ORAL NIGHTLY
Qty: 30 TABLET | Refills: 0 | Status: SHIPPED | OUTPATIENT
Start: 2023-06-16 | End: 2023-07-13

## 2023-06-16 RX ORDER — GABAPENTIN 100 MG/1
200 CAPSULE ORAL NIGHTLY
Qty: 30 CAPSULE | Refills: 0 | Status: SHIPPED | OUTPATIENT
Start: 2023-06-16 | End: 2023-06-23 | Stop reason: SDUPTHER

## 2023-06-16 NOTE — TELEPHONE ENCOUNTER
No care due was identified.  [facility] Embedded Care Due Messages. Reference number: [id number].   6/16/2023 8:11:46 AM CDT

## 2023-06-16 NOTE — TELEPHONE ENCOUNTER
LV - 5/19/23 nina    Pt was supposed to be seen today 6/16/23 for 4 wk f/u, rescheduled to 6/23/23. Requesting refills of gabapentin and ambien

## 2023-06-16 NOTE — TELEPHONE ENCOUNTER
Pt states in the past it gave him high heart rate, but he wasn't entirely sure it was the gabapentin. Has been taking it lately and he has been okay, so he says he should be good.

## 2023-06-23 ENCOUNTER — OFFICE VISIT (OUTPATIENT)
Dept: FAMILY MEDICINE | Facility: CLINIC | Age: 58
End: 2023-06-23
Payer: COMMERCIAL

## 2023-06-23 VITALS
DIASTOLIC BLOOD PRESSURE: 82 MMHG | HEART RATE: 92 BPM | WEIGHT: 181.56 LBS | HEIGHT: 70 IN | BODY MASS INDEX: 25.99 KG/M2 | TEMPERATURE: 96 F | SYSTOLIC BLOOD PRESSURE: 123 MMHG | OXYGEN SATURATION: 97 %

## 2023-06-23 DIAGNOSIS — I10 HYPERTENSION, UNSPECIFIED TYPE: ICD-10-CM

## 2023-06-23 DIAGNOSIS — B02.29 PHN (POSTHERPETIC NEURALGIA): ICD-10-CM

## 2023-06-23 DIAGNOSIS — I10 ESSENTIAL HYPERTENSION: Primary | ICD-10-CM

## 2023-06-23 DIAGNOSIS — Z88.8 ALLERGY TO STATIN MEDICATION: ICD-10-CM

## 2023-06-23 DIAGNOSIS — E78.5 HYPERLIPIDEMIA, UNSPECIFIED HYPERLIPIDEMIA TYPE: ICD-10-CM

## 2023-06-23 PROCEDURE — 3044F PR MOST RECENT HEMOGLOBIN A1C LEVEL <7.0%: ICD-10-PCS | Mod: CPTII,S$GLB,, | Performed by: FAMILY MEDICINE

## 2023-06-23 PROCEDURE — 3074F PR MOST RECENT SYSTOLIC BLOOD PRESSURE < 130 MM HG: ICD-10-PCS | Mod: CPTII,S$GLB,, | Performed by: FAMILY MEDICINE

## 2023-06-23 PROCEDURE — 1159F MED LIST DOCD IN RCRD: CPT | Mod: CPTII,S$GLB,, | Performed by: FAMILY MEDICINE

## 2023-06-23 PROCEDURE — 1159F PR MEDICATION LIST DOCUMENTED IN MEDICAL RECORD: ICD-10-PCS | Mod: CPTII,S$GLB,, | Performed by: FAMILY MEDICINE

## 2023-06-23 PROCEDURE — 99999 PR PBB SHADOW E&M-EST. PATIENT-LVL IV: ICD-10-PCS | Mod: PBBFAC,,, | Performed by: FAMILY MEDICINE

## 2023-06-23 PROCEDURE — 4010F ACE/ARB THERAPY RXD/TAKEN: CPT | Mod: CPTII,S$GLB,, | Performed by: FAMILY MEDICINE

## 2023-06-23 PROCEDURE — 3008F BODY MASS INDEX DOCD: CPT | Mod: CPTII,S$GLB,, | Performed by: FAMILY MEDICINE

## 2023-06-23 PROCEDURE — 3074F SYST BP LT 130 MM HG: CPT | Mod: CPTII,S$GLB,, | Performed by: FAMILY MEDICINE

## 2023-06-23 PROCEDURE — 3044F HG A1C LEVEL LT 7.0%: CPT | Mod: CPTII,S$GLB,, | Performed by: FAMILY MEDICINE

## 2023-06-23 PROCEDURE — 3079F PR MOST RECENT DIASTOLIC BLOOD PRESSURE 80-89 MM HG: ICD-10-PCS | Mod: CPTII,S$GLB,, | Performed by: FAMILY MEDICINE

## 2023-06-23 PROCEDURE — 99214 PR OFFICE/OUTPT VISIT, EST, LEVL IV, 30-39 MIN: ICD-10-PCS | Mod: S$GLB,,, | Performed by: FAMILY MEDICINE

## 2023-06-23 PROCEDURE — 99214 OFFICE O/P EST MOD 30 MIN: CPT | Mod: S$GLB,,, | Performed by: FAMILY MEDICINE

## 2023-06-23 PROCEDURE — 3008F PR BODY MASS INDEX (BMI) DOCUMENTED: ICD-10-PCS | Mod: CPTII,S$GLB,, | Performed by: FAMILY MEDICINE

## 2023-06-23 PROCEDURE — 3079F DIAST BP 80-89 MM HG: CPT | Mod: CPTII,S$GLB,, | Performed by: FAMILY MEDICINE

## 2023-06-23 PROCEDURE — 99999 PR PBB SHADOW E&M-EST. PATIENT-LVL IV: CPT | Mod: PBBFAC,,, | Performed by: FAMILY MEDICINE

## 2023-06-23 PROCEDURE — 4010F PR ACE/ARB THEARPY RXD/TAKEN: ICD-10-PCS | Mod: CPTII,S$GLB,, | Performed by: FAMILY MEDICINE

## 2023-06-23 RX ORDER — LOSARTAN POTASSIUM 25 MG/1
25 TABLET ORAL DAILY
Qty: 90 TABLET | Refills: 3 | Status: SHIPPED | OUTPATIENT
Start: 2023-06-23 | End: 2023-08-11 | Stop reason: SINTOL

## 2023-06-23 RX ORDER — METOPROLOL SUCCINATE 25 MG/1
50 TABLET, EXTENDED RELEASE ORAL 2 TIMES DAILY
COMMUNITY
Start: 2023-05-22 | End: 2023-08-11 | Stop reason: SDUPTHER

## 2023-06-23 RX ORDER — GABAPENTIN 100 MG/1
200 CAPSULE ORAL NIGHTLY
Qty: 60 CAPSULE | Refills: 2 | Status: SHIPPED | OUTPATIENT
Start: 2023-06-23 | End: 2023-08-11

## 2023-06-23 NOTE — PROGRESS NOTES
Subjective:      Patient ID: Harish Busby is a 57 y.o. male.    Chief Complaint: Follow-up    HPI    Patient with pmhx of polycythemia, HLD, HTN, SVT, non celiac gluten sensitivity, dairy sensitivity, insomnia here today for follow up. Stopped taking metoprolol when started taking losartan and went into svt so stopped taking losartan. But did take bp readings at home and diastolic is usually above 90. Has not yet seen allergist. Has decreased prednisone dose to 5-10 mg.     HPI 5/19/23  Patient with pmhx of polycythemia, HLD, HTN, SVT, non celiac gluten sensitivity, dairy sensitivity, insomnia here today for follow up. Had bout of shingles in January - rash resolved but still having pain due to it. Using gabapentin and nortriptyline. Has been diagnosed with Abdominal descending dissecting aorta. Following Dr. Sam and Dr. Whitman. Placed on BB, cholesterol injection as well. Being monitored for this every 6 months. Possible AAA but second opinion different feel that this was inaccurate. Also had to establish with Dr. Ortiz for allergies - due to allergy to device used for dissecting aorta. Taking daily 20 mg of prednisone right now due to shingles flare and allergy flare. Also taking daily nortriptyline for shingles pain which is helping.Also notes that when he took potassium, magnesium and B12 felt much better and his pulse came back down to normal. Unclear if now deficient in these      HPI 9/2022  Taking prednisone for sensitivities - 20 mg as needed. History of elevated estrogen in the past. 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 Recent cbc in 6/2022 - nml.      Was seeing Dr. Orourke - pcp  [facility] - SVT - metoprolol.      Unable to tolerate statin medications - memory loss - was on injection but held due to interfering with testosterone/allergy he stopped    Past Medical History:    Diagnosis Date    Hyperlipidemia     Hypertension     NCGS (non-celiac gluten sensitivity)     Polycythemia     Preop cardiovascular exam 11/30/2022    Statin intolerance     Supraventricular tachycardia     SVT (supraventricular tachycardia)        Past Surgical History:   Procedure Laterality Date    COLONOSCOPY N/A 4/28/2023    Procedure: COLONOSCOPY;  Surgeon: Brigid Gray MD;  Location: [facility] ENDO;  Service: Endoscopy;  Laterality: N/A;    DENTAL SURGERY      dental implant    ESOPHAGOGASTRODUODENOSCOPY N/A 4/28/2023    Procedure: EGD (ESOPHAGOGASTRODUODENOSCOPY);  Surgeon: Brigid Gray MD;  Location: [facility] ENDO;  Service: Endoscopy;  Laterality: N/A;    FINGER SURGERY         Family History   Problem Relation Age of Onset    Hypertension Mother     Cancer Father         cancer - leukemia    Cancer Brother         lung       Social History     Socioeconomic History    Marital status:    Occupational History    Occupation: [employer]   Tobacco Use    Smoking status: Former     Packs/day: 1.00     Years: 5.00     Pack years: 5.00     Types: Cigarettes    Smokeless tobacco: Never    Tobacco comments:     Quit 19 yo   Substance and Sexual Activity    Alcohol use: Not Currently    Drug use: Not Currently    Sexual activity: Not Currently       Health Maintenance Topics with due status: Not Due       Topic Last Completion Date    TETANUS VACCINE 08/07/2013    Influenza Vaccine 10/13/2021    Colorectal Cancer Screening 04/28/2023    Hemoglobin A1c (Diabetic Prevention Screening) 05/26/2023    Lipid Panel 05/26/2023       Medication List with Changes/Refills   Current Medications    ANASTROZOLE (ARIMIDEX) 1 MG TAB    Take 1 tablet (1 mg total) by mouth once daily.    EVOLOCUMAB (REPATHA SURECLICK) 140 MG/ML PNIJ    Inject 1 mL (140 mg total) into the skin every 14 (fourteen) days.    EZETIMIBE (ZETIA) 10 MG TABLET    Take 1 tablet (10 mg total) by mouth once daily.    GABAPENTIN  (NEURONTIN) 100 MG CAPSULE    Take 2 capsules (200 mg total) by mouth every evening. Shingles pain    HYDROCODONE-ACETAMINOPHEN (NORCO)  MG PER TABLET    Take 1 tablet by mouth every 12 (twelve) hours as needed for Pain.    LOSARTAN (COZAAR) 25 MG TABLET    Take 1 tablet (25 mg total) by mouth once daily.    METOPROLOL SUCCINATE (TOPROL-XL) 25 MG 24 HR TABLET    Take 50 mg by mouth 2 (two) times a day.    MULTIVITAMIN (THERAGRAN) PER TABLET    Take 1 tablet by mouth once daily.    NORTRIPTYLINE (PAMELOR) 25 MG CAPSULE    Take 25 mg by mouth 2 (two) times a day.    NORTRIPTYLINE (PAMELOR) 25 MG CAPSULE    Take 2 capsules (50 mg total) by mouth 2 (two) times daily.    ONDANSETRON (ZOFRAN-ODT) 4 MG TBDL    1-2 tablets PO every 6 hours as needed for nausea/vomiting while completing bowel prep    PANTOPRAZOLE (PROTONIX) 40 MG TABLET    TAKE 1 TABLET BY MOUTH ONCE DAILY AS NEEDED    PREDNISONE (DELTASONE) 20 MG TABLET    Take 1 tablet (20 mg total) by mouth daily as needed (food allergies).    TAMSULOSIN (FLOMAX) 0.4 MG CAP    Take 1 capsule (0.4 mg total) by mouth once daily.    ZOLPIDEM (AMBIEN) 10 MG TAB    Take 1 tablet (10 mg total) by mouth every evening.   Discontinued Medications    METOPROLOL SUCCINATE (TOPROL-XL) 100 MG 24 HR TABLET    Take 1 tablet (100 mg total) by mouth once daily.       Review of patient's allergies indicates:   Allergen Reactions    Milk Other (See Comments)    Wheat containing prod Hives, Rash, Shortness Of Breath and Swelling    Egg white     Gabapentin      Tachycardia, palpitations    Statins-hmg-coa reductase inhibitors Other (See Comments)    Adhesive Rash    Polyester fibers Rash       Review of Systems   Constitutional:  Negative for fever.   HENT:  Negative for congestion.    Eyes:  Negative for blurred vision.   Respiratory:  Negative for shortness of breath.    Cardiovascular:  Negative for chest pain and leg swelling.   Gastrointestinal:  Negative for abdominal pain,  constipation and diarrhea.   Genitourinary:  Negative for dysuria.   Skin:  Negative for rash.   Neurological:  Negative for headaches.     Objective:     Vitals:    06/23/23 0911   BP: 123/82   Pulse: 92   Temp: 96 °F (35.6 °C)     Body mass index is 26.05 kg/m².    Physical Exam  Vitals and nursing note reviewed.   Constitutional:       General: He is not in acute distress.     Appearance: He is well-developed.   HENT:      Head: Normocephalic and atraumatic.      Right Ear: External ear normal.      Left Ear: External ear normal.      Nose: Nose normal.   Eyes:      Conjunctiva/sclera: Conjunctivae normal.      Pupils: Pupils are equal, round, and reactive to light.   Neck:      Thyroid: No thyromegaly.   Cardiovascular:      Rate and Rhythm: Normal rate and regular rhythm.      Heart sounds: Normal heart sounds. No murmur heard.  Pulmonary:      Effort: Pulmonary effort is normal. No respiratory distress.      Breath sounds: Normal breath sounds. No wheezing or rales.   Chest:      Chest wall: No tenderness.   Abdominal:      General: Bowel sounds are normal. There is no distension.      Palpations: Abdomen is soft.      Tenderness: There is no abdominal tenderness.   Lymphadenopathy:      Cervical: No cervical adenopathy.   Skin:     General: Skin is warm and dry.   Neurological:      Mental Status: He is alert and oriented to person, place, and time.       Assessment and Plan:     Essential hypertension    Hyperlipidemia, unspecified hyperlipidemia type    Allergy to statin medication    PHN (postherpetic neuralgia)    Restart repatha   Restart losartan and continue metoprolol - following with cardiology   Will gets scheduled with allergist   Continue with medications         Follow up in about 6 months (around 12/23/2023), or if symptoms worsen or fail to improve, for follow up.

## 2023-06-27 ENCOUNTER — SPECIALTY PHARMACY (OUTPATIENT)
Dept: PHARMACY | Facility: CLINIC | Age: 58
End: 2023-06-27
Payer: COMMERCIAL

## 2023-06-27 NOTE — TELEPHONE ENCOUNTER
Specialty Pharmacy - Refill Coordination    Specialty Medication Orders Linked to Encounter      Flowsheet Row Most Recent Value   Medication #1 evolocumab (REPATHA SURECLICK) 140 mg/mL PnIj (Order#[id number], Rx#[id number])            Refill Questions - Documented Responses      Flowsheet Row Most Recent Value   Patient Availability and HIPAA Verification    Does patient want to proceed with activity? Yes   HIPAA/medical authority confirmed? Yes   Relationship to patient of person spoken to? Self   Refill Screening Questions    Would patient like to speak to a pharmacist? No   When does the patient need to receive the medication? 07/04/23   Refill Delivery Questions    How will the patient receive the medication? MEDRx   When does the patient need to receive the medication? 07/04/23   Shipping Address Home   Address in [facility] confirmed and updated if neccessary? Yes   Expected Copay ($) 5   Is the patient able to afford the medication copay? Yes   Payment Method CC on file   Days supply of Refill 28   Supplies needed? No supplies needed   Refill activity completed? Yes   Refill activity plan Refill scheduled   Shipment/Pickup Date: 06/29/23            Current Outpatient Medications   Medication Sig    anastrozole (ARIMIDEX) 1 mg Tab Take 1 tablet (1 mg total) by mouth once daily.    evolocumab (REPATHA SURECLICK) 140 mg/mL PnIj Inject 1 mL (140 mg total) into the skin every 14 (fourteen) days. (Patient not taking: Reported on 6/23/2023)    ezetimibe (ZETIA) 10 mg tablet Take 1 tablet (10 mg total) by mouth once daily.    gabapentin (NEURONTIN) 100 MG capsule Take 2 capsules (200 mg total) by mouth every evening. Shingles pain    HYDROcodone-acetaminophen (NORCO)  mg per tablet Take 1 tablet by mouth every 12 (twelve) hours as needed for Pain. (Patient not taking: Reported on 6/23/2023)    losartan (COZAAR) 25 MG tablet Take 1 tablet (25 mg total) by mouth once daily.    metoprolol succinate  (TOPROL-XL) 25 MG 24 hr tablet Take 50 mg by mouth 2 (two) times a day.    multivitamin (THERAGRAN) per tablet Take 1 tablet by mouth once daily.    nortriptyline (PAMELOR) 25 MG capsule Take 25 mg by mouth 2 (two) times a day.    nortriptyline (PAMELOR) 25 MG capsule Take 2 capsules (50 mg total) by mouth 2 (two) times daily.    ondansetron (ZOFRAN-ODT) 4 MG TbDL 1-2 tablets PO every 6 hours as needed for nausea/vomiting while completing bowel prep (Patient not taking: Reported on 6/23/2023)    pantoprazole (PROTONIX) 40 MG tablet TAKE 1 TABLET BY MOUTH ONCE DAILY AS NEEDED    predniSONE (DELTASONE) 20 MG tablet Take 1 tablet (20 mg total) by mouth daily as needed (food allergies).    tamsulosin (FLOMAX) 0.4 mg Cap Take 1 capsule (0.4 mg total) by mouth once daily.    zolpidem (AMBIEN) 10 mg Tab Take 1 tablet (10 mg total) by mouth every evening.   Last reviewed on 6/23/2023  9:10 AM by Josie Rodriguez MA    Review of patient's allergies indicates:   Allergen Reactions    Milk Other (See Comments)    Wheat containing prod Hives, Rash, Shortness Of Breath and Swelling    Egg white     Statins-hmg-coa reductase inhibitors Other (See Comments)    Adhesive Rash    Polyester fibers Rash    Last reviewed on  6/23/2023 9:08 AM by Jennifer Galindo      Tasks added this encounter   No tasks added.   Tasks due within next 3 months   No tasks due.     Tarsha Mays [facility]  [address] [place] [id number]  Phone: [phone]  Fax: [phone]

## 2023-07-13 RX ORDER — ZOLPIDEM TARTRATE 10 MG/1
TABLET ORAL
Qty: 30 TABLET | Refills: 0 | Status: SHIPPED | OUTPATIENT
Start: 2023-07-13 | End: 2023-08-11 | Stop reason: SDUPTHER

## 2023-07-13 NOTE — TELEPHONE ENCOUNTER
No care due was identified.  [facility] Embedded Care Due Messages. Reference number: [id number].   7/12/2023 10:18:57 PM CDT

## 2023-07-25 ENCOUNTER — PATIENT MESSAGE (OUTPATIENT)
Dept: PHARMACY | Facility: CLINIC | Age: 58
End: 2023-07-25
Payer: COMMERCIAL

## 2023-07-31 ENCOUNTER — SPECIALTY PHARMACY (OUTPATIENT)
Dept: PHARMACY | Facility: CLINIC | Age: 58
End: 2023-07-31
Payer: COMMERCIAL

## 2023-07-31 NOTE — TELEPHONE ENCOUNTER
Specialty Pharmacy - Refill Coordination    Specialty Medication Orders Linked to Encounter      Flowsheet Row Most Recent Value   Medication #1 evolocumab (REPATHA SURECLICK) 140 mg/mL PnIj (Order#[id number], Rx#[id number])          Refill Questions - Documented Responses      Flowsheet Row Most Recent Value   Patient Availability and HIPAA Verification    Does patient want to proceed with activity? Unable to Reach          We have had multiple attempts to the patient and have been unsuccessful to reach the patient. We will stop reaching out to the patient but in the event that the patient needs the med and contacts us, we will communicate and begin dispensing for the patient. At your next visit with the patient, please review the importance of being in contact with our specialty pharmacy as a part of our care team.      Yulia Orourke, PharmD  [facility]  [address] [facility] [address]  Phone: [phone]  Fax: [phone]

## 2023-08-04 ENCOUNTER — PATIENT MESSAGE (OUTPATIENT)
Dept: CARDIOLOGY | Facility: CLINIC | Age: 58
End: 2023-08-04
Payer: COMMERCIAL

## 2023-08-04 RX ORDER — METOPROLOL SUCCINATE 25 MG/1
50 TABLET, EXTENDED RELEASE ORAL
Qty: 180 TABLET | Refills: 0 | OUTPATIENT
Start: 2023-08-04

## 2023-08-04 NOTE — TELEPHONE ENCOUNTER
No care due was identified.  [facility] Embedded Care Due Messages. Reference number: [id number].   8/04/2023 1:20:51 PM CDT

## 2023-08-05 NOTE — TELEPHONE ENCOUNTER
Refill Decision Note   Harish Busby  is requesting a refill authorization.  Brief Assessment and Rationale for Refill:  Quick Discontinue     Medication Therapy Plan:         Comments:     Note composed:8:53 PM 08/04/2023

## 2023-08-11 ENCOUNTER — OFFICE VISIT (OUTPATIENT)
Dept: PRIMARY CARE CLINIC | Facility: CLINIC | Age: 58
End: 2023-08-11
Payer: COMMERCIAL

## 2023-08-11 VITALS
TEMPERATURE: 98 F | SYSTOLIC BLOOD PRESSURE: 120 MMHG | DIASTOLIC BLOOD PRESSURE: 78 MMHG | WEIGHT: 177.25 LBS | RESPIRATION RATE: 16 BRPM | BODY MASS INDEX: 25.38 KG/M2 | HEIGHT: 70 IN | OXYGEN SATURATION: 97 % | HEART RATE: 87 BPM

## 2023-08-11 DIAGNOSIS — I65.23 CAROTID ARTERY PLAQUE, BILATERAL: ICD-10-CM

## 2023-08-11 DIAGNOSIS — I73.9 PVD (PERIPHERAL VASCULAR DISEASE): ICD-10-CM

## 2023-08-11 DIAGNOSIS — E34.8 HYPERESTROGENISM IN MALE: ICD-10-CM

## 2023-08-11 DIAGNOSIS — E73.9 LACTOSE INTOLERANCE: ICD-10-CM

## 2023-08-11 DIAGNOSIS — K90.41 NCGS (NON-CELIAC GLUTEN SENSITIVITY): ICD-10-CM

## 2023-08-11 DIAGNOSIS — I10 PRIMARY HYPERTENSION: ICD-10-CM

## 2023-08-11 DIAGNOSIS — N40.0 BENIGN PROSTATIC HYPERPLASIA WITHOUT LOWER URINARY TRACT SYMPTOMS: ICD-10-CM

## 2023-08-11 DIAGNOSIS — B02.29 POSTHERPETIC NEURALGIA: Primary | ICD-10-CM

## 2023-08-11 DIAGNOSIS — I77.819 AORTIC ECTASIA: ICD-10-CM

## 2023-08-11 DIAGNOSIS — G47.00 INSOMNIA, UNSPECIFIED TYPE: ICD-10-CM

## 2023-08-11 DIAGNOSIS — Z78.9 STATIN INTOLERANCE: ICD-10-CM

## 2023-08-11 DIAGNOSIS — E78.49 ESSENTIAL FAMILIAL HYPERLIPIDEMIA: ICD-10-CM

## 2023-08-11 DIAGNOSIS — I71.43 INFRARENAL ABDOMINAL AORTIC ANEURYSM (AAA) WITHOUT RUPTURE: ICD-10-CM

## 2023-08-11 PROBLEM — R10.9 RIGHT SIDED ABDOMINAL PAIN: Status: RESOLVED | Noted: 2023-04-28 | Resolved: 2023-08-11

## 2023-08-11 PROBLEM — Z86.010 PERSONAL HISTORY OF COLONIC POLYPS: Status: RESOLVED | Noted: 2023-04-28 | Resolved: 2023-08-11

## 2023-08-11 PROBLEM — Z86.0100 PERSONAL HISTORY OF COLONIC POLYPS: Status: RESOLVED | Noted: 2023-04-28 | Resolved: 2023-08-11

## 2023-08-11 PROBLEM — R93.3 ABNORMAL CT SCAN, STOMACH: Status: RESOLVED | Noted: 2023-04-28 | Resolved: 2023-08-11

## 2023-08-11 PROBLEM — K59.00 CONSTIPATION: Status: RESOLVED | Noted: 2022-11-04 | Resolved: 2023-08-11

## 2023-08-11 PROBLEM — E78.5 DYSLIPIDEMIA: Status: RESOLVED | Noted: 2022-11-02 | Resolved: 2023-08-11

## 2023-08-11 PROCEDURE — 4010F PR ACE/ARB THEARPY RXD/TAKEN: ICD-10-PCS | Mod: CPTII,S$GLB,, | Performed by: FAMILY MEDICINE

## 2023-08-11 PROCEDURE — 3074F SYST BP LT 130 MM HG: CPT | Mod: CPTII,S$GLB,, | Performed by: FAMILY MEDICINE

## 2023-08-11 PROCEDURE — 99999 PR PBB SHADOW E&M-EST. PATIENT-LVL III: ICD-10-PCS | Mod: PBBFAC,,, | Performed by: FAMILY MEDICINE

## 2023-08-11 PROCEDURE — 4010F ACE/ARB THERAPY RXD/TAKEN: CPT | Mod: CPTII,S$GLB,, | Performed by: FAMILY MEDICINE

## 2023-08-11 PROCEDURE — 3008F PR BODY MASS INDEX (BMI) DOCUMENTED: ICD-10-PCS | Mod: CPTII,S$GLB,, | Performed by: FAMILY MEDICINE

## 2023-08-11 PROCEDURE — 1159F MED LIST DOCD IN RCRD: CPT | Mod: CPTII,S$GLB,, | Performed by: FAMILY MEDICINE

## 2023-08-11 PROCEDURE — 3074F PR MOST RECENT SYSTOLIC BLOOD PRESSURE < 130 MM HG: ICD-10-PCS | Mod: CPTII,S$GLB,, | Performed by: FAMILY MEDICINE

## 2023-08-11 PROCEDURE — 1159F PR MEDICATION LIST DOCUMENTED IN MEDICAL RECORD: ICD-10-PCS | Mod: CPTII,S$GLB,, | Performed by: FAMILY MEDICINE

## 2023-08-11 PROCEDURE — 3044F HG A1C LEVEL LT 7.0%: CPT | Mod: CPTII,S$GLB,, | Performed by: FAMILY MEDICINE

## 2023-08-11 PROCEDURE — 99215 OFFICE O/P EST HI 40 MIN: CPT | Mod: S$GLB,,, | Performed by: FAMILY MEDICINE

## 2023-08-11 PROCEDURE — 3078F DIAST BP <80 MM HG: CPT | Mod: CPTII,S$GLB,, | Performed by: FAMILY MEDICINE

## 2023-08-11 PROCEDURE — 3078F PR MOST RECENT DIASTOLIC BLOOD PRESSURE < 80 MM HG: ICD-10-PCS | Mod: CPTII,S$GLB,, | Performed by: FAMILY MEDICINE

## 2023-08-11 PROCEDURE — 99999 PR PBB SHADOW E&M-EST. PATIENT-LVL III: CPT | Mod: PBBFAC,,, | Performed by: FAMILY MEDICINE

## 2023-08-11 PROCEDURE — 99215 PR OFFICE/OUTPT VISIT, EST, LEVL V, 40-54 MIN: ICD-10-PCS | Mod: S$GLB,,, | Performed by: FAMILY MEDICINE

## 2023-08-11 PROCEDURE — 3044F PR MOST RECENT HEMOGLOBIN A1C LEVEL <7.0%: ICD-10-PCS | Mod: CPTII,S$GLB,, | Performed by: FAMILY MEDICINE

## 2023-08-11 PROCEDURE — 3008F BODY MASS INDEX DOCD: CPT | Mod: CPTII,S$GLB,, | Performed by: FAMILY MEDICINE

## 2023-08-11 RX ORDER — ZOLPIDEM TARTRATE 10 MG/1
10 TABLET ORAL NIGHTLY
Qty: 30 TABLET | Refills: 5 | Status: SHIPPED | OUTPATIENT
Start: 2023-08-11 | End: 2024-01-25 | Stop reason: SDUPTHER

## 2023-08-11 RX ORDER — METOPROLOL SUCCINATE 25 MG/1
50 TABLET, EXTENDED RELEASE ORAL 2 TIMES DAILY
Qty: 120 TABLET | Refills: 5 | Status: SHIPPED | OUTPATIENT
Start: 2023-08-11 | End: 2024-01-25 | Stop reason: SDUPTHER

## 2023-08-11 RX ORDER — METFORMIN HYDROCHLORIDE 500 MG/1
500 TABLET ORAL 2 TIMES DAILY
COMMUNITY
Start: 2023-08-08 | End: 2023-08-11 | Stop reason: SDUPTHER

## 2023-08-11 RX ORDER — METFORMIN HYDROCHLORIDE 500 MG/1
500 TABLET ORAL 2 TIMES DAILY
Qty: 60 TABLET | Refills: 5 | Status: SHIPPED | OUTPATIENT
Start: 2023-08-11 | End: 2024-03-18 | Stop reason: SDUPTHER

## 2023-08-11 NOTE — ASSESSMENT & PLAN NOTE
Stable on Ambien, even patient thinks this is helping with his post herpetic neuropathy pain at nighttime.  Okay to continue

## 2023-08-11 NOTE — ASSESSMENT & PLAN NOTE
Patient has tried gabapentin and not interested in trying any other medication due to side effects.  Issues managing with over-the-counter lidocaine

## 2023-08-11 NOTE — PROGRESS NOTES
"    [facility]       [address] [place] [address]      [phone]     Shirin Orourke MD         .      Office Visit  08/11/2023  [id number]      SUBJECTIVE     HPI:  Harish Busby is a 58 y.o. male presents today in clinic for "No chief complaint on file.  ."   PATIENT IS HERE FROM [facility] HERE TO REESTABLISH CARE.  HE HAS A LONG HISTORY OF CHRONIC IDIOPATHIC ALLERGIC REACTIONS, ELEVATED CHOLESTEROL AND ATHEROSCLEROSIS.  HE HAS BEEN SEEN BY CARDIOLOGY AND MORE RECENTLY WAS STARTED ON REPATHA AND PRALUENT BUT BOTH WHICH CAUSE SIDE EFFECTS AT THE SOME TIME BEING ON IT.  HE IS TRIED ALL STATINS AND UNABLE TO TOLERATE.  HE DID IN JANUARY GET SHINGLES ON THE RIGHT SIDE THAT AFFECTED HIS LOWER FLANK BACK SIDE AND GROIN AREA AND HE IS STILL HAVING PERSISTENT NEUROPATHIC TYPE PAIN.  HE TRIED MEDICATIONS BUT NOTHING SEEMS TO HELP.  IT WAS AT THE POINT HE WAS NOT ABLE TO GO TO WORK BECAUSE HE COULD NOT BUT HIS PANTS.  THIS HAS BEEN THE MOST DISTRESSING EVENT FOR HIM.  HE IS NOT REALLY CONCERNED ABOUT HIS CHOLESTEROL AS THIS HAS BEEN HIS TYPICAL CHOLESTEROL FOR MANY MANY YEARS.  HE STILL TAKES PREDNISONE ONLY AS NEEDED FOR ALLERGIC FLARE-UPS.  DOES THINK DIET HAS A BIG PART OF HIS SYMPTOMS.  WOULD LIKE A REFILL ON AMBIEN.  HE IS ALSO TAKING AROMATASE INHIBITOR FOR UNKNOWN IDIOPATHIC ELEVATED ESTROGEN LEVELS.  HE IS NOT ON ANY TESTOSTERONE THERAPY.  HE IS NEEDING SOME MED REFILLS.  HE HAD LABS DRAWN WITH HIS OTHER PRIMARY CARE THAT HE WAS SEEING UNTIL HE WAS ABLE TO SEE ME IT HAS THESE FOR REVIEW        ROS   Review of symptoms are negative except as noted.     PAST MEDICAL HISTORY     Past Medical History:   Diagnosis Date    Hyperlipidemia     Hypertension     NCGS (non-celiac gluten sensitivity)     Polycythemia     Preop cardiovascular exam 11/30/2022    Statin intolerance     Supraventricular tachycardia     SVT " (supraventricular tachycardia)        Past Surgical History:   Procedure Laterality Date    COLONOSCOPY N/A 4/28/2023    Procedure: COLONOSCOPY;  Surgeon: Brigid Gray MD;  Location: [facility];  Service: Endoscopy;  Laterality: N/A;    DENTAL SURGERY      dental implant    ESOPHAGOGASTRODUODENOSCOPY N/A 4/28/2023    Procedure: EGD (ESOPHAGOGASTRODUODENOSCOPY);  Surgeon: Brigid Gray MD;  Location: [facility] END[facility];  Service: Endoscopy;  Laterality: N/A;    FINGER SURGERY         Social History     Socioeconomic History    Marital status:    Occupational History    Occupation: [employer]   Tobacco Use    Smoking status: Former     Current packs/day: 1.00     Average packs/day: 1 pack/day for 5.0 years (5.0 ttl pk-yrs)     Types: Cigarettes     Passive exposure: Never    Smokeless tobacco: Never    Tobacco comments:     Quit 17 yo   Substance and Sexual Activity    Alcohol use: Not Currently    Drug use: Not Currently    Sexual activity: Not Currently       Allergies as of 08/11/2023 - Reviewed 08/11/2023   Allergen Reaction Noted    Milk Other (See Comments) 10/19/2022    Wheat containing prod Hives, Rash, Shortness Of Breath, and Swelling 01/01/2015    Egg white  10/19/2022    Statins-hmg-coa reductase inhibitors Other (See Comments) 11/21/2019    Adhesive Rash 02/23/2013    Polyester fibers Rash 10/19/2022       HOME MEDS     Current Outpatient Medications   Medication Instructions    anastrozole (ARIMIDEX) 1 mg, Oral, Daily    bempedoic acid-ezetimibe 180-10 mg Tab 1 tablet, Oral, Daily    ezetimibe (ZETIA) 10 mg, Oral, Daily    metFORMIN (GLUCOPHAGE) 500 mg, Oral, 2 times daily    metoprolol succinate (TOPROL-XL) 50 mg, Oral, 2 times daily    pantoprazole (PROTONIX) 40 MG tablet TAKE 1 TABLET BY MOUTH ONCE DAILY AS NEEDED    predniSONE (DELTASONE) 20 mg, Oral, Daily PRN    tamsulosin (FLOMAX) 0.4 mg, Oral, Daily    zolpidem (AMBIEN) 10 mg, Oral, Nightly       The following were updated and  "reviewed by myself in the chart: medications, past medical history, past surgical history, family history, social history, and allergies.        Objective    OBJECTIVE   /78   Pulse 87   Temp 98.3 °F (36.8 °C)   Resp 16   Ht 5' 10" (1.778 m)   Wt 80.4 kg (177 lb 4 oz)   SpO2 97%   BMI 25.43 kg/m²     Wt Readings from Last 2 Encounters:   08/11/23 80.4 kg (177 lb 4 oz)   06/23/23 82.3 kg (181 lb 8.8 oz)       BP Readings from Last 2 Encounters:   08/11/23 120/78   06/23/23 123/82          Physical Exam  Vitals and nursing note reviewed.   Constitutional:       Appearance: Normal appearance. He is normal weight.   HENT:      Head: Normocephalic and atraumatic.      Comments: CUSHING'S FACIES     Nose: Nose normal.      Mouth/Throat:      Mouth: Mucous membranes are dry.   Eyes:      General: Lids are normal.      Conjunctiva/sclera: Conjunctivae normal.      Pupils: Pupils are equal, round, and reactive to light.   Neck:      Thyroid: No thyromegaly or thyroid tenderness.   Cardiovascular:      Rate and Rhythm: Normal rate and regular rhythm.      Pulses: Normal pulses.   Pulmonary:      Effort: Pulmonary effort is normal.      Breath sounds: Normal breath sounds.   Abdominal:      General: Abdomen is flat.      Palpations: Abdomen is soft.      Tenderness: There is no abdominal tenderness.   Musculoskeletal:         General: Normal range of motion.      Cervical back: Normal range of motion and neck supple. No muscular tenderness.   Skin:     General: Skin is warm and dry.      Findings: No lesion or rash.      Comments: Flushed   Neurological:      General: No focal deficit present.      Mental Status: He is alert and oriented to person, place, and time.      Cranial Nerves: Cranial nerves 2-12 are intact.      Sensory: Sensation is intact.      Motor: Motor function is intact.      Coordination: Coordination is intact.      Gait: Gait is intact.   Psychiatric:         Attention and Perception: " Attention normal. He is attentive.         Mood and Affect: Mood is anxious. Mood is not depressed. Affect is not tearful.         Speech: Speech is not rapid and pressured.         Behavior: Behavior normal. Behavior is not slowed or hyperactive.               LABS      LAB RESULTS, IF AVAILABLE, ARE DISCUSSED WITH PATIENT AND POSTED TO PATIENT PORTAL     ASSESSMENT & PLAN     1. Postherpetic neuralgia  Assessment & Plan:  Patient has tried gabapentin and not interested in trying any other medication due to side effects.  Issues managing with over-the-counter lidocaine      2. Statin intolerance  -     bempedoic acid-ezetimibe 180-10 mg Tab; Take 1 tablet by mouth once daily.  Dispense: 30 tablet; Refill: 5    3. PVD (peripheral vascular disease)  Overview:  Less than 25% plaque in carotid      4. Essential familial hyperlipidemia  Overview:  Reviewed importance of advanced lipid profiles. Advise daily exercise. Diet is recommended. Patients are encouraged to obtain healthy BMI weight. Risks associated with high cholesterol are well established and include but are not limited to heart disease, stroke and peripheral vascular disease. Patient should not smoke. Goal LDL particle number is <1000 and ApoB <70. Basic LDL is below 100 or below 70 if diabetic. Some non-FDA approved dietary supplements that may be beneficial to patient include but is not limited to high fiber diet at least 30g daily; niacin ER non flush free variety 500mg-1,000mg; Omega-3 fish oil DHA+EPA = 1,000mg twice daily; baby dose aspirin 81mg; co-enzyme q10 500mg to help reduce statin-induced myalgia; red rice yeast 1200mg twice daily; berberine 1000mg-2000mg daily. Patient is encouraged to exercise routinely and adhere to heart healthy diet with Mediterranean diet showing the most consistent data to help with lipid management.      Assessment & Plan:  Not able to tolerate any statins, agree to try next death to see if this could be more effective.   We could also consider colchicine as a means to help reduce cholesterol in overall inflammation.  Okay with patient taking metformin as patient had some that he was able to take and thought it had great improvement on just overall symptoms and his good health.  May also have a small role in helping with plaque stabilization as well    Orders:  -     bempedoic acid-ezetimibe 180-10 mg Tab; Take 1 tablet by mouth once daily.  Dispense: 30 tablet; Refill: 5  -     metFORMIN (GLUCOPHAGE) 500 MG tablet; Take 1 tablet (500 mg total) by mouth 2 (two) times daily.  Dispense: 60 tablet; Refill: 5  -     CBC Without Differential; Future; Expected date: 08/11/2023  -     Comprehensive Metabolic Panel; Future; Expected date: 08/11/2023  -     Lipid Panel; Future; Expected date: 08/11/2023  -     Hemoglobin A1C; Future; Expected date: 08/11/2023    5. Hyperestrogenism in male  Assessment & Plan:  Previously elevated estrogen and has been on anastrozole ever since.  He only takes it as needed as patient knows when his symptoms of elevated estrogen occur    Orders:  -     Estrogens, Total; Future; Expected date: 08/11/2023    6. Lactose intolerance    7. Carotid artery plaque, bilateral  -     bempedoic acid-ezetimibe 180-10 mg Tab; Take 1 tablet by mouth once daily.  Dispense: 30 tablet; Refill: 5    8. Primary hypertension  Overview:  Patient understands pathophysiology of high blood pressure. Patient should take meds as directed. Patient is asked to monitor blood pressure at home at random and send in BP diary if changes are made to treatment plan so adjustments can be made, if necessary, within 1-2 weeks of stopping, starting or changing medications. Patient will notify us if any symptoms occur including but not limited to dizziness, faint feeling, headache, blurred vision or chest pain or go to nearest ER if necessary for evaluation.      Assessment & Plan:  Controlled on metoprolol.  Could not tolerate losartan    Orders:  -      metoprolol succinate (TOPROL-XL) 25 MG 24 hr tablet; Take 2 tablets (50 mg total) by mouth 2 (two) times a day.  Dispense: 120 tablet; Refill: 5    9. Insomnia, unspecified type  Overview:  Risks associated with sleep medications are common and should be used cautiously; Patient should avoid taking with other medications that may have adverse effect with one another. Patient should avoid drinking alcohol while taking sleep medications. Patient should notify me if any side effects occur when taking these medications.  Prolonged use of sleep medications can develop dependence and/or addiction and we will monitor for these things. Patient advised to take meds only as directed to avoid adverse reaction.      Assessment & Plan:  Stable on Ambien, even patient thinks this is helping with his post herpetic neuropathy pain at nighttime.  Okay to continue      Orders:  -     zolpidem (AMBIEN) 10 mg Tab; Take 1 tablet (10 mg total) by mouth every evening.  Dispense: 30 tablet; Refill: 5    10. Aortic ectasia  Overview:  Was seen by vascular      11. Infrarenal abdominal aortic aneurysm (AAA) without rupture  Overview:  Was seen by vascular.  Has ahistory of a five yr smoking but quit nearly 35 years      12. Benign prostatic hyperplasia without lower urinary tract symptoms  Assessment & Plan:  Stable and takes Flomax      13. NCGS (non-celiac gluten sensitivity)  Assessment & Plan:  Has many food allergies in triggers so patient does his best to eat clean        Reviewed labs from previous doctor, we will try another treatment for elevated cholesterol.  Plan to repeat labs in six months unless patient tells me otherwise.    I spent a total of 45 minutes face to face and non-face to face on the date of this visit.This includes time preparing to see the patient (eg, review of tests, notes), obtaining and/or reviewing additional history from an independent historian and/or outside medical records, documenting clinical  "information in the electronic health record, independently interpreting results and/or communicating results to the patient/family/caregiver, or care coordinator.      Disclaimer: Portions of this record may have been created with voice recognition software. Occasional wrong-word or "sound-a-like" substitutions may have occurred due to inherent limitations of voice recognition software. Read the chart carefully and recognize, using context, where substitutions have occurred."    Signed by:  Shirin Orourke MD           "

## 2023-08-11 NOTE — ASSESSMENT & PLAN NOTE
Not able to tolerate any statins, agree to try next death to see if this could be more effective.  We could also consider colchicine as a means to help reduce cholesterol in overall inflammation.  Okay with patient taking metformin as patient had some that he was able to take and thought it had great improvement on just overall symptoms and his good health.  May also have a small role in helping with plaque stabilization as well

## 2023-08-11 NOTE — ASSESSMENT & PLAN NOTE
Previously elevated estrogen and has been on anastrozole ever since.  He only takes it as needed as patient knows when his symptoms of elevated estrogen occur

## 2023-08-22 ENCOUNTER — PATIENT MESSAGE (OUTPATIENT)
Dept: PRIMARY CARE CLINIC | Facility: CLINIC | Age: 58
End: 2023-08-22
Payer: COMMERCIAL

## 2023-08-22 RX ORDER — GABAPENTIN 300 MG/1
300 CAPSULE ORAL 3 TIMES DAILY PRN
Qty: 30 CAPSULE | Refills: 2 | Status: SHIPPED | OUTPATIENT
Start: 2023-08-22 | End: 2023-10-06

## 2023-10-06 RX ORDER — GABAPENTIN 300 MG/1
CAPSULE ORAL
Qty: 30 CAPSULE | Refills: 5 | Status: SHIPPED | OUTPATIENT
Start: 2023-10-06 | End: 2023-12-11

## 2023-12-11 RX ORDER — GABAPENTIN 300 MG/1
CAPSULE ORAL
Qty: 30 CAPSULE | Refills: 5 | Status: SHIPPED | OUTPATIENT
Start: 2023-12-11 | End: 2024-02-22

## 2023-12-20 ENCOUNTER — TELEPHONE (OUTPATIENT)
Dept: PHARMACY | Facility: CLINIC | Age: 58
End: 2023-12-20
Payer: COMMERCIAL

## 2024-01-25 DIAGNOSIS — I10 PRIMARY HYPERTENSION: ICD-10-CM

## 2024-01-25 DIAGNOSIS — G47.00 INSOMNIA, UNSPECIFIED TYPE: ICD-10-CM

## 2024-01-25 RX ORDER — ZOLPIDEM TARTRATE 10 MG/1
10 TABLET ORAL NIGHTLY
Qty: 30 TABLET | Refills: 5 | Status: SHIPPED | OUTPATIENT
Start: 2024-01-25 | End: 2024-03-18 | Stop reason: SDUPTHER

## 2024-01-25 RX ORDER — METOPROLOL SUCCINATE 25 MG/1
50 TABLET, EXTENDED RELEASE ORAL 2 TIMES DAILY
Qty: 120 TABLET | Refills: 5 | Status: SHIPPED | OUTPATIENT
Start: 2024-01-25 | End: 2024-03-18 | Stop reason: SDUPTHER

## 2024-02-21 NOTE — TELEPHONE ENCOUNTER
Patient states that he is having to take this medication up to 3-4 times a day because he isn't getting any relief with one dosage. Please advise on medication change and refills.

## 2024-02-22 RX ORDER — GABAPENTIN 300 MG/1
CAPSULE ORAL
Qty: 30 CAPSULE | Refills: 5 | Status: SHIPPED | OUTPATIENT
Start: 2024-02-22 | End: 2024-03-18 | Stop reason: SDUPTHER

## 2024-02-22 RX ORDER — GABAPENTIN 300 MG/1
300 CAPSULE ORAL NIGHTLY
Qty: 30 CAPSULE | Refills: 5 | Status: SHIPPED | OUTPATIENT
Start: 2024-02-22 | End: 2024-03-18

## 2024-03-18 ENCOUNTER — OFFICE VISIT (OUTPATIENT)
Dept: PRIMARY CARE CLINIC | Facility: CLINIC | Age: 59
End: 2024-03-18
Payer: COMMERCIAL

## 2024-03-18 VITALS
WEIGHT: 171.75 LBS | HEIGHT: 70 IN | TEMPERATURE: 97 F | OXYGEN SATURATION: 99 % | HEART RATE: 70 BPM | BODY MASS INDEX: 24.59 KG/M2 | DIASTOLIC BLOOD PRESSURE: 80 MMHG | SYSTOLIC BLOOD PRESSURE: 136 MMHG

## 2024-03-18 DIAGNOSIS — E78.49 ESSENTIAL FAMILIAL HYPERLIPIDEMIA: ICD-10-CM

## 2024-03-18 DIAGNOSIS — B02.29 POSTHERPETIC NEURALGIA: ICD-10-CM

## 2024-03-18 DIAGNOSIS — E78.2 MIXED HYPERLIPIDEMIA: ICD-10-CM

## 2024-03-18 DIAGNOSIS — I47.10 SVT (SUPRAVENTRICULAR TACHYCARDIA): ICD-10-CM

## 2024-03-18 DIAGNOSIS — I71.20 THORACIC AORTIC ANEURYSM WITHOUT RUPTURE, UNSPECIFIED PART: ICD-10-CM

## 2024-03-18 DIAGNOSIS — Z76.0 MEDICATION REFILL: ICD-10-CM

## 2024-03-18 DIAGNOSIS — I10 PRIMARY HYPERTENSION: ICD-10-CM

## 2024-03-18 DIAGNOSIS — G47.00 INSOMNIA, UNSPECIFIED TYPE: ICD-10-CM

## 2024-03-18 DIAGNOSIS — M25.549 ARTHRALGIA OF HAND, UNSPECIFIED LATERALITY: Primary | ICD-10-CM

## 2024-03-18 PROCEDURE — 3075F SYST BP GE 130 - 139MM HG: CPT | Mod: CPTII,S$GLB,, | Performed by: FAMILY MEDICINE

## 2024-03-18 PROCEDURE — 3008F BODY MASS INDEX DOCD: CPT | Mod: CPTII,S$GLB,, | Performed by: FAMILY MEDICINE

## 2024-03-18 PROCEDURE — 99215 OFFICE O/P EST HI 40 MIN: CPT | Mod: S$GLB,,, | Performed by: FAMILY MEDICINE

## 2024-03-18 PROCEDURE — 99999 PR PBB SHADOW E&M-EST. PATIENT-LVL III: CPT | Mod: PBBFAC,,, | Performed by: FAMILY MEDICINE

## 2024-03-18 PROCEDURE — 1159F MED LIST DOCD IN RCRD: CPT | Mod: CPTII,S$GLB,, | Performed by: FAMILY MEDICINE

## 2024-03-18 PROCEDURE — 3079F DIAST BP 80-89 MM HG: CPT | Mod: CPTII,S$GLB,, | Performed by: FAMILY MEDICINE

## 2024-03-18 RX ORDER — METFORMIN HYDROCHLORIDE 500 MG/1
1000 TABLET ORAL 2 TIMES DAILY WITH MEALS
Qty: 360 TABLET | Refills: 1 | Status: SHIPPED | OUTPATIENT
Start: 2024-03-18

## 2024-03-18 RX ORDER — EZETIMIBE 10 MG/1
10 TABLET ORAL DAILY
Qty: 90 TABLET | Refills: 3 | Status: SHIPPED | OUTPATIENT
Start: 2024-03-18

## 2024-03-18 RX ORDER — GABAPENTIN 300 MG/1
300 CAPSULE ORAL 3 TIMES DAILY
Qty: 270 CAPSULE | Refills: 1 | Status: SHIPPED | OUTPATIENT
Start: 2024-03-18

## 2024-03-18 RX ORDER — PREDNISONE 20 MG/1
20 TABLET ORAL DAILY PRN
Qty: 90 TABLET | Refills: 0 | Status: SHIPPED | OUTPATIENT
Start: 2024-03-18

## 2024-03-18 RX ORDER — ZOLPIDEM TARTRATE 10 MG/1
10 TABLET ORAL NIGHTLY
Qty: 90 TABLET | Refills: 1 | Status: SHIPPED | OUTPATIENT
Start: 2024-03-18

## 2024-03-18 RX ORDER — METOPROLOL SUCCINATE 25 MG/1
50 TABLET, EXTENDED RELEASE ORAL 2 TIMES DAILY
Qty: 360 TABLET | Refills: 1 | Status: SHIPPED | OUTPATIENT
Start: 2024-03-18

## 2024-03-18 NOTE — PROGRESS NOTES
"    [facility]       [address] [place] [address]      [phone]     Shirin Orourke MD         .      Office Visit  03/18/2024  [id number]      SUBJECTIVE     HPI:  Harish Busby is a 58 y.o. male presents today in clinic for "Follow-up and Hand Pain  ."   Patient is here for routine follow-up.  His insurance is no longer covering the next visit, so he has been off all cholesterol medications.  Since then he has been intermittent fasting and did lose 15 lb.  He is also running more.  Did have labs drawn at a another facility for my review.  He otherwise is feeling good.  Gabapentin is still helping with his post herpetic neuropathic pain.  Sleep is improved with Ambien.  Still taking metoprolol for SVTs.  Does see cardiology.  Of interest, he does have a triple a that is being monitored but was told that he did not need surgery.  He is asymptomatic.  It was found incidentally.  He also has mild plaque in the carotids as well as femoral arteries.  No longer taking aspirin or fish oil for no clear reason.  Has been seen by vascular.        ROS   Review of symptoms are negative except as noted.     PAST MEDICAL HISTORY     Past Medical History:   Diagnosis Date    Hyperlipidemia     Hypertension     NCGS (non-celiac gluten sensitivity)     Polycythemia     Preop cardiovascular exam 11/30/2022    Statin intolerance     Supraventricular tachycardia     SVT (supraventricular tachycardia)        Past Surgical History:   Procedure Laterality Date    COLONOSCOPY N/A 4/28/2023    Procedure: COLONOSCOPY;  Surgeon: Brigid Gray MD;  Location: [facility];  Service: Endoscopy;  Laterality: N/A;    DENTAL SURGERY      dental implant    ESOPHAGOGASTRODUODENOSCOPY N/A 4/28/2023    Procedure: EGD (ESOPHAGOGASTRODUODENOSCOPY);  Surgeon: Brigid Gray MD;  Location: [facility];  Service: Endoscopy;  Laterality: N/A;    FINGER SURGERY         Social " "History     Socioeconomic History    Marital status:    Occupational History    Occupation: [employer]   Tobacco Use    Smoking status: Former     Current packs/day: 1.00     Average packs/day: 1 pack/day for 5.0 years (5.0 ttl pk-yrs)     Types: Cigarettes     Passive exposure: Never    Smokeless tobacco: Never    Tobacco comments:     Quit 19 yo   Substance and Sexual Activity    Alcohol use: Not Currently    Drug use: Not Currently    Sexual activity: Not Currently       Allergies as of 03/18/2024 - Reviewed 03/18/2024   Allergen Reaction Noted    Milk Other (See Comments) 10/19/2022    Wheat containing prod Hives, Rash, Shortness Of Breath, and Swelling 01/01/2015    Egg white  10/19/2022    Statins-hmg-coa reductase inhibitors Other (See Comments) 11/21/2019    Adhesive Rash 02/23/2013    Polyester fibers Rash 10/19/2022       HOME MEDS     Current Outpatient Medications   Medication Instructions    gabapentin (NEURONTIN) 300 MG capsule TAKE 1 CAPSULE BY MOUTH THREE TIMES DAILY AS NEEDED FOR PAIN    metFORMIN (GLUCOPHAGE) 500 mg, Oral, 2 times daily    metoprolol succinate (TOPROL-XL) 50 mg, Oral, 2 times daily    predniSONE (DELTASONE) 20 mg, Oral, Daily PRN    zolpidem (AMBIEN) 10 mg, Oral, Nightly       The following were updated and reviewed by myself in the chart: medications, past medical history, past surgical history, family history, social history, and allergies.        Objective    OBJECTIVE   /80   Pulse 70   Temp 97.4 °F (36.3 °C)   Ht 5' 10" (1.778 m)   Wt 77.9 kg (171 lb 11.8 oz)   SpO2 99%   BMI 24.64 kg/m²     Wt Readings from Last 2 Encounters:   03/18/24 77.9 kg (171 lb 11.8 oz)   08/11/23 80.4 kg (177 lb 4 oz)       BP Readings from Last 2 Encounters:   03/18/24 136/80   08/11/23 120/78          Physical Exam  Vitals and nursing note reviewed.   Constitutional:       Appearance: Normal appearance. He is normal weight.   HENT:      Head: Normocephalic and " atraumatic.      Nose: Nose normal.      Mouth/Throat:      Mouth: Mucous membranes are dry.   Eyes:      General: Lids are normal.      Conjunctiva/sclera: Conjunctivae normal.      Pupils: Pupils are equal, round, and reactive to light.   Neck:      Thyroid: No thyromegaly or thyroid tenderness.   Cardiovascular:      Rate and Rhythm: Normal rate and regular rhythm.      Pulses: Normal pulses.   Pulmonary:      Effort: Pulmonary effort is normal.      Breath sounds: Normal breath sounds.   Abdominal:      General: Abdomen is flat.      Palpations: Abdomen is soft.      Tenderness: There is no abdominal tenderness.   Musculoskeletal:         General: Normal range of motion.      Cervical back: Normal range of motion and neck supple. No muscular tenderness.   Skin:     General: Skin is warm and dry.      Findings: No lesion or rash.      Comments: Flushed   Neurological:      General: No focal deficit present.      Mental Status: He is alert and oriented to person, place, and time.      Cranial Nerves: Cranial nerves 2-12 are intact.      Sensory: Sensation is intact.      Motor: Motor function is intact.      Coordination: Coordination is intact.      Gait: Gait is intact.   Psychiatric:         Attention and Perception: Attention normal. He is attentive.         Mood and Affect: Mood is anxious. Mood is not depressed. Affect is not tearful.         Speech: Speech is not rapid and pressured.         Behavior: Behavior normal. Behavior is not slowed or hyperactive.               LABS      LAB RESULTS, IF AVAILABLE, ARE DISCUSSED WITH PATIENT AND POSTED TO PATIENT PORTAL     ASSESSMENT & PLAN     1. Arthralgia of hand, unspecified laterality  -     Uric Acid; Future; Expected date: 03/18/2024  -     JUS; Future; Expected date: 03/18/2024  -     Rheumatoid Factor; Future; Expected date: 03/18/2024  Does have a family history of rheumatoid arthritis, we will look at labs next time.  Okay to take anti-inflammatory  daily.  2. Essential familial hyperlipidemia  Overview:  Reviewed importance of advanced lipid profiles. Advise daily exercise. Diet is recommended. Patients are encouraged to obtain healthy BMI weight. Risks associated with high cholesterol are well established and include but are not limited to heart disease, stroke and peripheral vascular disease. Patient should not smoke. Goal LDL particle number is <1000 and ApoB <70. Basic LDL is below 100 or below 70 if diabetic. Some non-FDA approved dietary supplements that may be beneficial to patient include but is not limited to high fiber diet at least 30g daily; niacin ER non flush free variety 500mg-1,000mg; Omega-3 fish oil DHA+EPA = 1,000mg twice daily; baby dose aspirin 81mg; co-enzyme q10 500mg to help reduce statin-induced myalgia; red rice yeast 1200mg twice daily; berberine 1000mg-2000mg daily. Patient is encouraged to exercise routinely and adhere to heart healthy diet with Mediterranean diet showing the most consistent data to help with lipid management.    We will restart Zetia since patient will not be able to take nextizet  Unable to tolerate statins  Orders:  -     CBC Without Differential; Future; Expected date: 03/18/2024  -     Comprehensive Metabolic Panel; Future; Expected date: 03/18/2024  -     Lipid Panel; Future; Expected date: 03/18/2024    3. Postherpetic neuralgia  Controlled on gabapentin.  We will continue  4. Primary hypertension  Overview:  Patient understands pathophysiology of high blood pressure. Patient should take meds as directed. Patient is asked to monitor blood pressure at home at random and send in BP diary if changes are made to treatment plan so adjustments can be made, if necessary, within 1-2 weeks of stopping, starting or changing medications. Patient will notify us if any symptoms occur including but not limited to dizziness, faint feeling, headache, blurred vision or chest pain or go to nearest ER if necessary for  "evaluation.    Blood pressure stable.  We will continue metoprolol.    5. Medication refill    6. Insomnia, unspecified type  Overview:  Risks associated with sleep medications are common and should be used cautiously; Patient should avoid taking with other medications that may have adverse effect with one another. Patient should avoid drinking alcohol while taking sleep medications. Patient should notify me if any side effects occur when taking these medications.  Prolonged use of sleep medications can develop dependence and/or addiction and we will monitor for these things. Patient advised to take meds only as directed to avoid adverse reaction.            8. Thoracic aortic aneurysm without rupture, unspecified part  Seen by vascular, under watchful surveillance.  Asymptomatic.  9. SVT (supraventricular tachycardia)  On metoprolol, controlled.  Sees cardiology.        I spent a total of 45 minutes face to face and non-face to face on the date of this visit.This includes time preparing to see the patient (eg, review of tests, notes), obtaining and/or reviewing additional history from an independent historian and/or outside medical records, documenting clinical information in the electronic health record, independently interpreting results and/or communicating results to the patient/family/caregiver, or care coordinator.      Disclaimer: Portions of this record may have been created with voice recognition software. Occasional wrong-word or "sound-a-like" substitutions may have occurred due to inherent limitations of voice recognition software. Read the chart carefully and recognize, using context, where substitutions have occurred."    Signed by:  Shirin Orourke MD           "

## 2024-04-15 ENCOUNTER — TELEPHONE (OUTPATIENT)
Dept: PHARMACY | Facility: CLINIC | Age: 59
End: 2024-04-15
Payer: COMMERCIAL

## 2024-09-10 ENCOUNTER — TELEPHONE (OUTPATIENT)
Dept: PHARMACY | Facility: CLINIC | Age: 59
End: 2024-09-10
Payer: COMMERCIAL

## 2024-09-10 NOTE — TELEPHONE ENCOUNTER
[facility] Chart Review & Patient Outreach Details For Medication Adherence Project    Reason for Outreach Encounter: 3rd Party payor non-compliance report ([employer], [employer], C, etc)  2.  Patient Outreach Method: Reviewed Patient Chart  3.   Medication in question: metformin 500 mg    LAST FILLED: 6/14/24 for 90 day supply  Diabetes Medications               metFORMIN (GLUCOPHAGE) 500 MG tablet Take 2 tablets (1,000 mg total) by mouth 2 (two) times daily with meals.              4.  Reviewed and or Updates Made To: Patient Chart  5. Outreach Outcomes and/or actions taken: Patient filled medication and is on track to be adherent

## 2024-09-20 ENCOUNTER — OFFICE VISIT (OUTPATIENT)
Dept: PRIMARY CARE CLINIC | Facility: CLINIC | Age: 59
End: 2024-09-20
Payer: COMMERCIAL

## 2024-09-20 VITALS
DIASTOLIC BLOOD PRESSURE: 88 MMHG | HEART RATE: 73 BPM | WEIGHT: 174.19 LBS | HEIGHT: 70 IN | BODY MASS INDEX: 24.94 KG/M2 | SYSTOLIC BLOOD PRESSURE: 124 MMHG

## 2024-09-20 DIAGNOSIS — I47.10 PAROXYSMAL SVT (SUPRAVENTRICULAR TACHYCARDIA): ICD-10-CM

## 2024-09-20 DIAGNOSIS — Z76.89 ENCOUNTER TO ESTABLISH CARE: ICD-10-CM

## 2024-09-20 DIAGNOSIS — B02.29 POSTHERPETIC NEURALGIA: ICD-10-CM

## 2024-09-20 DIAGNOSIS — Z00.00 ANNUAL PHYSICAL EXAM: Primary | ICD-10-CM

## 2024-09-20 DIAGNOSIS — Z12.5 ENCOUNTER FOR SCREENING FOR MALIGNANT NEOPLASM OF PROSTATE: ICD-10-CM

## 2024-09-20 DIAGNOSIS — G47.00 INSOMNIA, UNSPECIFIED TYPE: ICD-10-CM

## 2024-09-20 DIAGNOSIS — E78.2 MIXED HYPERLIPIDEMIA: ICD-10-CM

## 2024-09-20 DIAGNOSIS — Z13.1 ENCOUNTER FOR SCREENING FOR DIABETES MELLITUS: ICD-10-CM

## 2024-09-20 DIAGNOSIS — E78.49 ESSENTIAL FAMILIAL HYPERLIPIDEMIA: ICD-10-CM

## 2024-09-20 PROCEDURE — 99999 PR PBB SHADOW E&M-EST. PATIENT-LVL IV: CPT | Mod: PBBFAC,,, | Performed by: FAMILY MEDICINE

## 2024-09-20 RX ORDER — PREDNISONE 20 MG/1
20 TABLET ORAL DAILY PRN
Qty: 90 TABLET | Refills: 0 | Status: SHIPPED | OUTPATIENT
Start: 2024-09-20

## 2024-09-20 RX ORDER — METFORMIN HYDROCHLORIDE 500 MG/1
1000 TABLET ORAL 2 TIMES DAILY WITH MEALS
Qty: 360 TABLET | Refills: 3 | Status: SHIPPED | OUTPATIENT
Start: 2024-09-20

## 2024-09-20 RX ORDER — GABAPENTIN 300 MG/1
300 CAPSULE ORAL 3 TIMES DAILY
Qty: 270 CAPSULE | Refills: 3 | Status: SHIPPED | OUTPATIENT
Start: 2024-09-20

## 2024-09-20 RX ORDER — EZETIMIBE 10 MG/1
10 TABLET ORAL DAILY
Qty: 90 TABLET | Refills: 3 | Status: SHIPPED | OUTPATIENT
Start: 2024-09-20

## 2024-09-20 RX ORDER — ZOLPIDEM TARTRATE 10 MG/1
10 TABLET ORAL NIGHTLY
Qty: 30 TABLET | Refills: 5 | Status: SHIPPED | OUTPATIENT
Start: 2024-09-20

## 2024-09-20 RX ORDER — METOPROLOL SUCCINATE 25 MG/1
50 TABLET, EXTENDED RELEASE ORAL 2 TIMES DAILY
Qty: 360 TABLET | Refills: 3 | Status: SHIPPED | OUTPATIENT
Start: 2024-09-20

## 2024-09-20 NOTE — PROGRESS NOTES
"    [facility]       [address] [place] [address]      Phone: [phone]      Fax: [phone]    Filiberto Walter MD                Office Visit  09/20/2024        Subjective      HPI:  Harish Busby is a 59 y.o. male presents today in clinic for "Establish Care  ."     59-year-old gentleman presents today to establish care, wellness exam.      Patient states his previous PCP left the company.  Needs to get established with someone new.    Overall, feels good today.  No chest pain, shortness on breath.  No fever, chills, body aches.  No coughing, sneezing, URI type symptoms.  Appetite normal.  Bowel movements normal.  No urinary issues.      States that he has SVT.  Takes Toprol-XL twice a day for this.  Keeps it under control, has not had any episodes in awhile.    Also has HLD, familial.  Currently takes Zetia 10 mg daily.  Has tried Praluent injections in the past.  Those made him feel bad.  Give him a bad experience.  Has also tried various statins, they caused memory loss, had no effect on his cholesterol.  So far, tolerates the Zetia.      Also has AAA.  Follows with vascular regularly.  Not big enough to repair.  Stable.    Has issues with sleep.  Uses Ambien at night.  Works well for him.      Has some arthritis, inflammation.  Previous PCP checked for rheumatoid arthritis, labs were negative.  Keeps a bottle of prednisone on hand to use, as needed.  Tends to work well for him.  Only uses it sporadically.      Also deals with post herpetic neuralgia.  Had a bad episode of shingles last year.  Finally got over the rash, but left with pain on the right side of his abdomen, along the belt line.  Takes gabapentin, which helps.  Keeps it tolerable.  Has tried topical lidocaine in other creams in the past, which helps some, but do not give complete relief.  Any friction tends to aggravate it.  Tries to just keep pants lose, avoid contact.    PMH:  " HLD.  SVT.  AAA.  Sleep difficulties.  SVT.    PSH:  Kidney cyst.    F MH: SVT.  HLD.  Lung cancer.  Myasthenia gravis.  Allergy: Statins   Social: Works in [employer] systems.    T: Denies   A: Rarely   D: Denies    Exercise:  Runs occasionally.    Urology: Dr. Moore  Vascular: Dr. James (CVT)  Cardiology:  [facility] (has been several years since he has been there)    Colon: 04/28/2023.  Repeat five years (2028)        The following were updated and reviewed by myself in the chart: medications, past medical history, past surgical history, family history, social history, and allergies.     Medications:  Current Outpatient Medications on File Prior to Visit   Medication Sig Dispense Refill    [DISCONTINUED] ezetimibe (ZETIA) 10 mg tablet Take 1 tablet (10 mg total) by mouth once daily. 90 tablet 3    [DISCONTINUED] gabapentin (NEURONTIN) 300 MG capsule Take 1 capsule (300 mg total) by mouth 3 (three) times daily. 270 capsule 1    [DISCONTINUED] metFORMIN (GLUCOPHAGE) 500 MG tablet Take 2 tablets (1,000 mg total) by mouth 2 (two) times daily with meals. 360 tablet 1    [DISCONTINUED] metoprolol succinate (TOPROL-XL) 25 MG 24 hr tablet Take 2 tablets (50 mg total) by mouth 2 (two) times a day. 360 tablet 1    [DISCONTINUED] predniSONE (DELTASONE) 20 MG tablet Take 1 tablet (20 mg total) by mouth daily as needed (food allergies). 90 tablet 0    [DISCONTINUED] zolpidem (AMBIEN) 10 mg Tab Take 1 tablet (10 mg total) by mouth every evening. 90 tablet 1     No current facility-administered medications on file prior to visit.        PMHx:  Past Medical History:   Diagnosis Date    Hyperlipidemia     Hypertension     NCGS (non-celiac gluten sensitivity)     Polycythemia     Preop cardiovascular exam 11/30/2022    Statin intolerance     Supraventricular tachycardia     SVT (supraventricular tachycardia)       Patient Active Problem List    Diagnosis Date Noted    Thoracic aortic aneurysm without  rupture, unspecified part 03/18/2024    Sustained SVT 03/18/2024    Essential familial hyperlipidemia 08/11/2023    Postherpetic neuralgia 08/11/2023    Hyperestrogenism in male 08/11/2023    Lactose intolerance 08/11/2023    Carotid artery plaque, bilateral 08/11/2023    Insomnia 08/11/2023    Infrarenal abdominal aortic aneurysm (AAA) without rupture 11/30/2022    PVD (peripheral vascular disease) 11/30/2022    Aortic atherosclerosis 11/30/2022    Primary hypertension 11/02/2022    Kidney cysts 11/02/2022    Kidney stones 11/02/2022    Benign prostatic hyperplasia without lower urinary tract symptoms 11/02/2022    Aortic ectasia 11/02/2022    Supraventricular tachycardia     NCGS (non-celiac gluten sensitivity)     Statin intolerance         PSHx:  Past Surgical History:   Procedure Laterality Date    COLONOSCOPY N/A 4/28/2023    Procedure: COLONOSCOPY;  Surgeon: Brigid Gray MD;  Location: [facility];  Service: Endoscopy;  Laterality: N/A;    DENTAL SURGERY      dental implant    ESOPHAGOGASTRODUODENOSCOPY N/A 4/28/2023    Procedure: EGD (ESOPHAGOGASTRODUODENOSCOPY);  Surgeon: Brigid Gray MD;  Location: [facility];  Service: Endoscopy;  Laterality: N/A;    FINGER SURGERY          FHx:  Family History   Problem Relation Name Age of Onset    Hypertension Mother      Cancer Father          cancer - leukemia    Cancer Brother          lung        Social:  Social History     Socioeconomic History    Marital status:    Occupational History    Occupation: [employer]   Tobacco Use    Smoking status: Former     Current packs/day: 1.00     Average packs/day: 1 pack/day for 5.0 years (5.0 ttl pk-yrs)     Types: Cigarettes     Passive exposure: Never    Smokeless tobacco: Never    Tobacco comments:     Quit 17 yo   Substance and Sexual Activity    Alcohol use: Not Currently    Drug use: Not Currently    Sexual activity: Not Currently        Allergies:  Review of patient's allergies indicates:  "  Allergen Reactions    Milk Other (See Comments)    Wheat containing prod Hives, Rash, Shortness Of Breath and Swelling    Egg white     Statins-hmg-coa reductase inhibitors Other (See Comments)    Adhesive Rash    Polyester fibers Rash        ROS:  Review of Systems   Constitutional:  Negative for activity change, appetite change, chills and fever.   HENT:  Negative for congestion, postnasal drip, rhinorrhea, sore throat and trouble swallowing.    Respiratory:  Negative for cough and shortness of breath.    Cardiovascular:  Negative for chest pain and palpitations.   Gastrointestinal:  Negative for abdominal pain, constipation, diarrhea, nausea and vomiting.   Genitourinary:  Negative for difficulty urinating.   Musculoskeletal:  Negative for arthralgias and myalgias.   Skin:  Negative for color change and rash.   Neurological:  Negative for headaches.   All other systems reviewed and are negative.         Objective      /88   Pulse 73   Ht 5' 10" (1.778 m)   Wt 79 kg (174 lb 2.6 oz)   BMI 24.99 kg/m²   Ht Readings from Last 3 Encounters:   09/20/24 5' 10" (1.778 m)   03/18/24 5' 10" (1.778 m)   08/11/23 5' 10" (1.778 m)     Wt Readings from Last 3 Encounters:   09/20/24 79 kg (174 lb 2.6 oz)   03/18/24 77.9 kg (171 lb 11.8 oz)   08/11/23 80.4 kg (177 lb 4 oz)       PHYSICAL EXAM:  Physical Exam  Vitals and nursing note reviewed.   Constitutional:       General: He is not in acute distress.     Appearance: Normal appearance.   HENT:      Head: Normocephalic and atraumatic.      Right Ear: Tympanic membrane, ear canal and external ear normal.      Left Ear: Tympanic membrane, ear canal and external ear normal.      Nose: Nose normal. No congestion or rhinorrhea.      Mouth/Throat:      Mouth: Mucous membranes are moist.      Pharynx: Oropharynx is clear. No oropharyngeal exudate or posterior oropharyngeal erythema.   Eyes:      Extraocular Movements: Extraocular movements intact.      Conjunctiva/sclera: " Conjunctivae normal.      Pupils: Pupils are equal, round, and reactive to light.   Cardiovascular:      Rate and Rhythm: Normal rate and regular rhythm.   Pulmonary:      Effort: Pulmonary effort is normal.      Breath sounds: No wheezing, rhonchi or rales.   Musculoskeletal:         General: Normal range of motion.      Cervical back: Normal range of motion.   Lymphadenopathy:      Cervical: No cervical adenopathy.   Skin:     General: Skin is warm and dry.   Neurological:      General: No focal deficit present.      Mental Status: He is alert.              LABS / IMAGING:  Recent Results (from the past 4368 hour(s))   CBC Without Differential    Collection Time: 09/10/24  9:46 AM   Result Value Ref Range    WBC 6.0 3.8 - 10.8 Thousand/uL    RBC 5.27 4.20 - 5.80 Million/uL    Hemoglobin 16.4 13.2 - 17.1 g/dL    Hematocrit 49.2 38.5 - 50.0 %    MCV 93.4 80.0 - 100.0 fL    MCH 31.1 27.0 - 33.0 pg    MCHC 33.3 32.0 - 36.0 g/dL    RDW 12.7 11.0 - 15.0 %    Platelets 224 140 - 400 Thousand/uL    MPV 10.7 7.5 - 12.5 fL   Comprehensive Metabolic Panel    Collection Time: 09/10/24  9:46 AM   Result Value Ref Range    Glucose 84 65 - 99 mg/dL    BUN 12 7 - 25 mg/dL    Creatinine 1.14 0.70 - 1.35 mg/dL    eGFR 74 > OR = 60 mL/min/1.73m2    BUN/Creatinine Ratio SEE NOTE: 6 - 22 (calc)    Sodium 139 135 - 146 mmol/L    Potassium 4.5 3.5 - 5.3 mmol/L    Chloride 104 98 - 110 mmol/L    CO2 25 20 - 32 mmol/L    Calcium 9.2 8.6 - 10.3 mg/dL    Total Protein 6.2 6.1 - 8.1 g/dL    Albumin 4.2 3.6 - 5.1 g/dL    Globulin, Total 2.0 1.9 - 3.7 g/dL (calc)    Albumin/Globulin Ratio 2.1 1.0 - 2.5 (calc)    Total Bilirubin 0.7 0.2 - 1.2 mg/dL    Alkaline Phosphatase 73 35 - 144 U/L    AST 17 10 - 35 U/L    ALT 13 9 - 46 U/L   Lipid Panel    Collection Time: 09/10/24  9:46 AM   Result Value Ref Range    Cholesterol 255 (H) <200 mg/dL    HDL 43 > OR = 40 mg/dL    Triglycerides 162 (H) <150 mg/dL    LDL Cholesterol 181 (H) mg/dL (calc)     HDL/Cholesterol Ratio 5.9 (H) <5.0 (calc)    Non HDL Chol. (LDL+VLDL) 212 (H) <130 mg/dL (calc)   Uric Acid    Collection Time: 09/10/24  9:46 AM   Result Value Ref Range    Uric Acid 6.5 4.0 - 8.0 mg/dL   JUS    Collection Time: 09/10/24  9:46 AM   Result Value Ref Range    JUS NEGATIVE NEGATIVE   Rheumatoid Factor    Collection Time: 09/10/24  9:46 AM   Result Value Ref Range    Rheumatoid Factor <10 <14 IU/mL         Assessment    1. Annual physical exam    2. Encounter to establish care    3. Mixed hyperlipidemia    4. Essential familial hyperlipidemia    5. Postherpetic neuralgia    6. Paroxysmal SVT (supraventricular tachycardia)    7. Insomnia, unspecified type    8. Encounter for screening for diabetes mellitus    9. Encounter for screening for malignant neoplasm of prostate          Plan    Harish was seen today for establish care.    Diagnoses and all orders for this visit:    Annual physical exam    Encounter to establish care    Mixed hyperlipidemia  -     ezetimibe (ZETIA) 10 mg tablet; Take 1 tablet (10 mg total) by mouth once daily.  -     Lipid Panel; Future    Essential familial hyperlipidemia  -     metFORMIN (GLUCOPHAGE) 500 MG tablet; Take 2 tablets (1,000 mg total) by mouth 2 (two) times daily with meals.  -     Lipid Panel; Future    Postherpetic neuralgia  -     gabapentin (NEURONTIN) 300 MG capsule; Take 1 capsule (300 mg total) by mouth 3 (three) times daily.  -     Ambulatory referral/consult to Pain Clinic; Future    Paroxysmal SVT (supraventricular tachycardia)  -     metoprolol succinate (TOPROL-XL) 25 MG 24 hr tablet; Take 2 tablets (50 mg total) by mouth 2 (two) times a day.  -     Lipid Panel; Future  -     TSH; Future  -     Comprehensive Metabolic Panel; Future  -     CBC Auto Differential; Future    Insomnia, unspecified type  -     zolpidem (AMBIEN) 10 mg Tab; Take 1 tablet (10 mg total) by mouth every evening.    Encounter for screening for diabetes mellitus  -     Hemoglobin  A1C; Future    Encounter for screening for malignant neoplasm of prostate  -     PSA, Screening; Future    Other orders  -     predniSONE (DELTASONE) 20 MG tablet; Take 1 tablet (20 mg total) by mouth daily as needed (food allergies).      Physically, everything looks pretty good.      Reviewed recent labs with patient.  Generally okay.  Cholesterol still elevated.  Has tried statins, Praluent.  Wonder if not the might work for him?  At next visit, we will recheck cholesterol levels.  May consider new referral to Cardiology.    Gabapentin seems to be helping his post herpetic neuralgia.  Spoke with Dr. RODRIGUEZ.  He maybe able to help, as well.  Referral placed.    Screening lab orders placed today to be done prior to next visit.    Med refills, as above.    FOLLOW-UP:  Follow up in about 6 months (around 3/20/2025) for check up, labs 1 week prior.    I spent a total of 30 minutes face to face and non-face to face on the date of this visit.This includes time preparing to see the patient (eg, review of tests, notes), obtaining and/or reviewing additional history from an independent historian and/or outside medical records, documenting clinical information in the electronic health record, independently interpreting results and/or communicating results to the patient/family/caregiver, or care coordinator.  Visit today included increased complexity associated with the care of the episodic problem addressed and managing the longitudinal care of the patient due to the serious and/or complex managed problem(s).    Signed by:  Filiberto Walter MD

## 2024-09-20 NOTE — PATIENT INSTRUCTIONS
Physically, everything looks really good today.      The blood work you had done last week looks fine.  I did notice that a PSA level was not checked.  Let us plan to do that before our next visit.  Orders placed today.  Feel free to stop by the clinic a couple of days prior to the visit to get the blood drawn so we can discuss results together.      Refills of your medicines have been sent to the pharmacy.  Please continue taking them, as directed.      For the post herpetic neuralgia, let us have you see our pain specialist, Dr. Michele.  He has a few things he maybe able to try to get you some longer-term relief.  Referral placed today.  He comes here on Tuesdays and Fridays.      Continue to eat a healthy diet.  Be careful with portion sizes.  Includes lots of fresh fruits, vegetables, whole grains, lean proteins.  See info below.    Keep hydrated.  Be sure to drink at least 8-10, 8 oz, glasses of water every day.    Stay active.  Try to do some sort of physical activity every day.  Nothing outrageous, just try walking for 10-15 minutes each day.

## 2024-09-25 ENCOUNTER — TELEPHONE (OUTPATIENT)
Dept: PAIN MEDICINE | Facility: CLINIC | Age: 59
End: 2024-09-25
Payer: COMMERCIAL

## 2024-09-25 NOTE — PROGRESS NOTES
New Patient Chronic Pain Note (Initial Visit)    Referring Physician: Filiberto Walter MD    PCP: Filiberto Walter MD    Chief Complaint:   Chief Complaint   Patient presents with    Low-back Pain        SUBJECTIVE:    Harish Busby is a 59 y.o. male with past medical history significant for post herpetic neuralgia, essential familial hyperlipidemia peripheral vascular disease, hypertension, SVT, BPH, nephrolithiasis who presents to the clinic for the evaluation of lower back and right groin pain following a shingles outbreak.  Patient reports he had shingles January 19, 2023 and has had persistent pain in his territory since.  Pain is intermittent and today is rated a 3/10.  Pain at its best is a 3/10 and at its worse is an 8/10.  Pain is described as burning in nature.  Patient was on a protracted prescription of Valtrex following his infection without improvement.  Today he reports pain from the middle of the spine to the right flank and down into the right groin L1-2 dermatomal distribution.  Pain is exacerbated with wearing pants and friction.  Pain is improved with gabapentin which she is taking 300 mg up to 2 or 3 times daily.  He does report mental fog, sensations of goofiness with this medication.  Of note he is a  of a [employer] and requires mental alertness in his unable to increase this dosage.  Patient has tried lidocaine topically with ineffective relief.  He also reports adhesive allergy so has not tried topical patches.  Patient has never received prior intervention for post herpetic neuralgia.  He denies distal radiculopathy, lower extremity weakness, bowel or bladder incontinence or saddle anesthesia.    Patient denies night fever/night sweats, urinary incontinence, bowel incontinence, significant weight loss, significant motor weakness, and loss of sensations.    Pain Disability Index Review:         9/26/2024     7:56 AM   Last 3 PDI Scores   Pain Disability Index  (PDI) 27       Non-Pharmacologic Treatments:  Physical Therapy/Home Exercise: yes  Ice/Heat:yes  TENS: no  Acupuncture: no  Massage: no  Chiropractic: no    Other: no      Pain Medications:  - Adjuvant Medications: Ambien (Zolpidem), Neurontin (Gabapentin), and Prednisone (Deltasone)    Pain Procedures:   None    Past Medical History:   Diagnosis Date    Hyperlipidemia     Hypertension     NCGS (non-celiac gluten sensitivity)     Polycythemia     Preop cardiovascular exam 11/30/2022    Statin intolerance     Supraventricular tachycardia     SVT (supraventricular tachycardia)      Past Surgical History:   Procedure Laterality Date    COLONOSCOPY N/A 4/28/2023    Procedure: COLONOSCOPY;  Surgeon: Brigid Gray MD;  Location: [facility] ENDO;  Service: Endoscopy;  Laterality: N/A;    DENTAL SURGERY      dental implant    ESOPHAGOGASTRODUODENOSCOPY N/A 4/28/2023    Procedure: EGD (ESOPHAGOGASTRODUODENOSCOPY);  Surgeon: Brigid Gray MD;  Location: [facility] ENDO;  Service: Endoscopy;  Laterality: N/A;    FINGER SURGERY       Review of patient's allergies indicates:   Allergen Reactions    Milk Other (See Comments)    Wheat containing prod Hives, Rash, Shortness Of Breath and Swelling    Egg white     Statins-hmg-coa reductase inhibitors Other (See Comments)    Adhesive Rash    Polyester fibers Rash       Current Outpatient Medications   Medication Sig    ezetimibe (ZETIA) 10 mg tablet Take 1 tablet (10 mg total) by mouth once daily.    gabapentin (NEURONTIN) 300 MG capsule Take 1 capsule (300 mg total) by mouth 3 (three) times daily.    metFORMIN (GLUCOPHAGE) 500 MG tablet Take 2 tablets (1,000 mg total) by mouth 2 (two) times daily with meals.    metoprolol succinate (TOPROL-XL) 25 MG 24 hr tablet Take 2 tablets (50 mg total) by mouth 2 (two) times a day.    predniSONE (DELTASONE) 20 MG tablet Take 1 tablet (20 mg total) by mouth daily as needed (food allergies).    zolpidem (AMBIEN) 10 mg Tab Take 1 tablet (10 mg  "total) by mouth every evening.     No current facility-administered medications for this visit.         ROS:  GENERAL:  No weight loss, malaise or fevers.  HEENT:   No recent changes in vision or hearing  NECK:  Negative for lumps, no difficulty with swallowing.  RESPIRATORY:  Negative for cough, wheezing or shortness of breath, patient denies any recent URI.  CARDIOVASCULAR:  Negative for chest pain or palpitations.  GI:  Negative for abdominal discomfort, blood in stools or black stools or change in bowel habits.  MUSCULOSKELETAL:  See HPI.  SKIN:  Negative for lesions, rash, and itching.  PSYCH:  No mood disorder or recent psychosocial stressors.   HEMATOLOGY/LYMPHOLOGY:  Negative for prolonged bleeding, bruising easily or swollen nodes.    NEURO:   No history of syncope, paralysis, seizures or tremors.  All other reviewed and negative other than HPI.    OBJECTIVE:    /88   Pulse 71   Resp 17   Ht 5' 10" (1.778 m)   Wt 81.2 kg (179 lb)   BMI 25.68 kg/m²       Physical Exam:    GENERAL: Well appearing, in no acute distress, alert and oriented x3.  PSYCH:  Mood and affect appropriate.  SKIN: Skin color, texture, turgor normal, no rashes or lesions.  HEAD/FACE:  Normocephalic, atraumatic. Cranial nerves grossly intact.    CV: RRR with palpation of the radial artery.  PULM: No evidence of respiratory difficulty, symmetric chest rise.  GI:  Soft and non-tender.    BACK: Straight leg raising in the sitting and supine positions is negative to radicular pain. No pain to palpation over the facet joints of the lumbar spine or spinous processes. Normal range of motion without pain reproduction.  EXTREMITIES: Peripheral joint ROM is full and pain free without obvious instability or laxity in all four extremities. No deformities, edema, or skin discoloration. Good capillary refill.  MUSCULOSKELETAL: Able to stand on heels & toes.    hip provocative maneuvers are negative.  There is no pain with palpation over the " sacroiliac joints bilaterally.  Gaenslen's, Distraction/Compression and  FABERs test is negative.  Facet loading test is negative bilaterally.   Bilateral upper and lower extremity strength is normal and symmetric.  No atrophy or tone abnormalities are noted.    RIGHT Lower extremity: Hip flexion 5/5, Hip Abduction 5/5, Hip Adduction 5/5, Knee extension 5/5, Knee flexion 5/5, Ankle dorsiflexion5/5, Extensor hallucis longus 5/5, Ankle plantarflexion 5/5  LEFT Lower extremity:  Hip flexion 5/5, Hip Abduction 5/5,Hip Adduction 5/5, Knee extension 5/5, Knee flexion 5/5, Ankle dorsiflexion 5/5, Extensor hallucis longus 5/5, Ankle plantarflexion 5/5  -Normal testing knee (patellar) jerk and ankle (achilles) jerk    NEURO: Bilateral upper and lower extremity coordination and muscle stretch reflexes are physiologic and symmetric. No loss of sensation is noted.  GAIT: normal.    Imaging:   None in system        ASSESSMENT: 59 y.o. year old male with     1. Neuropathic pain        2. Postherpetic neuralgia  Ambulatory referral/consult to Pain Clinic    Prior authorization Order    [facility] - OTHER          PLAN:   - Interventions:  Schedule for Qutenza patch application for post herpetic neuralgia (4 patches)--45 minute treatment.  Patient has failed to have significant improvement with acyclovir, gabapentin, topical lidocaine. Explained the risks and benefits of the procedure in detail with the patient today in clinic along with alternative treatment options, and the patient elected to pursue the intervention at this time.      - Anticoagulation use: No no anticoagulation    -We have discussed future consideration of an epidural, peripheral nerve stimulation or spinal cord stimulation for post herpetic neuralgia     report:  Reviewed and consistent with medication use as prescribed.    - Medications:  -Continue Gabapentin.  We have reviewed potential side effects of this medication including daytime somnolence, weight gain and  peripheral edema  -Gabapentin 300 mg b.i.d.-t.i.d.  -Patient can not further increased secondary to mental fog    - Follow up visit: return to clinic for Qutenza patch application    The above plan and management options were discussed at length with patient. Patient is in agreement with the above and verbalized understanding.    - I discussed the goals of interventional chronic pain management with the patient on today's visit. We discussed a multimodal and systematic approach to pain.  This includes diagnostic and therapeutic injections, adjuvant pharmacologic treatment, physical therapy, and at times psychiatry.  I emphasized the importance of regular exercise, core strengthening and stretching, diet and weight loss as a cornerstone of long-term pain management.    - This condition does not require this patient to take time off of work, and the primary goal of our Pain Management services is to improve the patient's functional capacity.  - Patient Questions: Answered all of the patient's questions regarding diagnoses, therapy, treatment and next steps        Yoselyn Mayen MD  Interventional Pain Management  [facility]    Disclaimer:  This note was prepared using voice recognition system and is likely to have sound alike errors that may have been overlooked even after proof reading.  Please call me with any questions

## 2024-09-26 ENCOUNTER — OFFICE VISIT (OUTPATIENT)
Dept: PAIN MEDICINE | Facility: CLINIC | Age: 59
End: 2024-09-26
Payer: COMMERCIAL

## 2024-09-26 VITALS
DIASTOLIC BLOOD PRESSURE: 88 MMHG | HEART RATE: 71 BPM | BODY MASS INDEX: 25.62 KG/M2 | RESPIRATION RATE: 17 BRPM | HEIGHT: 70 IN | WEIGHT: 179 LBS | SYSTOLIC BLOOD PRESSURE: 121 MMHG

## 2024-09-26 DIAGNOSIS — M79.2 NEUROPATHIC PAIN: Primary | ICD-10-CM

## 2024-09-26 DIAGNOSIS — B02.29 POSTHERPETIC NEURALGIA: ICD-10-CM

## 2024-09-26 PROCEDURE — 99204 OFFICE O/P NEW MOD 45 MIN: CPT | Mod: S$GLB,,, | Performed by: ANESTHESIOLOGY

## 2024-09-26 PROCEDURE — 1160F RVW MEDS BY RX/DR IN RCRD: CPT | Mod: CPTII,S$GLB,, | Performed by: ANESTHESIOLOGY

## 2024-09-26 PROCEDURE — 3079F DIAST BP 80-89 MM HG: CPT | Mod: CPTII,S$GLB,, | Performed by: ANESTHESIOLOGY

## 2024-09-26 PROCEDURE — 3074F SYST BP LT 130 MM HG: CPT | Mod: CPTII,S$GLB,, | Performed by: ANESTHESIOLOGY

## 2024-09-26 PROCEDURE — 3008F BODY MASS INDEX DOCD: CPT | Mod: CPTII,S$GLB,, | Performed by: ANESTHESIOLOGY

## 2024-09-26 PROCEDURE — 1159F MED LIST DOCD IN RCRD: CPT | Mod: CPTII,S$GLB,, | Performed by: ANESTHESIOLOGY

## 2024-09-26 PROCEDURE — 99999 PR PBB SHADOW E&M-EST. PATIENT-LVL IV: CPT | Mod: PBBFAC,,, | Performed by: ANESTHESIOLOGY

## 2024-09-30 ENCOUNTER — PATIENT MESSAGE (OUTPATIENT)
Dept: PAIN MEDICINE | Facility: CLINIC | Age: 59
End: 2024-09-30
Payer: COMMERCIAL

## 2024-10-01 NOTE — PROGRESS NOTES
Established Patient - TeleHealth Visit    The patient location is: [place]  The chief complaint leading to consultation is: chronic pain     Visit type: audiovisual    Face to Face time with patient: 10-15 minutes  20 minutes of total time spent on the encounter, which includes face to face time and non-face to face time preparing to see the patient (eg, review of tests), Obtaining and/or reviewing separately obtained history, Documenting clinical information in the electronic or other health record, Independently interpreting results (not separately reported) and communicating results to the patient/family/caregiver, or Care coordination (not separately reported).     Each patient to whom he or she provides medical services by telemedicine is:  (1) informed of the relationship between the physician and patient and the respective role of any other health care provider with respect to management of the patient; and (2) notified that he or she may decline to receive medical services by telemedicine and may withdraw from such care at any time.      Chronic Pain -- Established Patient (Follow-up visit)    Referring Physician: Filiberto Walter MD    PCP: Filiberto Walter MD    Chief Complaint:   Chief Complaint   Patient presents with    Follow-up     Back pain - also along right waistline (following a shingles outbreak)       SUBJECTIVE:    Interval History (10/2/2024):  Patient presents today for follow-up visit.   At the last visit, the plan was to schedule patient for Qutenza application, which she has decided not to move forward with.  He has an allergy to adhesives, and he is concerned that this will also cause a reaction for him.    Initial HPI (09/26/2024):  Harish Busby is a 59 y.o. male with past medical history significant for post herpetic neuralgia, essential familial hyperlipidemia peripheral vascular disease, hypertension, SVT, BPH, nephrolithiasis who presents to the clinic for the evaluation of  lower back and right groin pain following a shingles outbreak.  Patient reports he had shingles January 19, 2023 and has had persistent pain in his territory since.  Pain is intermittent and today is rated a 3/10.  Pain at its best is a 3/10 and at its worse is an 8/10.  Pain is described as burning in nature.  Patient was on a protracted prescription of Valtrex following his infection without improvement.  Today he reports pain from the middle of the spine to the right flank and down into the right groin L1-2 dermatomal distribution.  Pain is exacerbated with wearing pants and friction.  Pain is improved with gabapentin which she is taking 300 mg up to 2 or 3 times daily.  He does report mental fog, sensations of goofiness with this medication.  Of note he is a  of a [employer] and requires mental alertness in his unable to increase this dosage.  Patient has tried lidocaine topically with ineffective relief.  He also reports adhesive allergy so has not tried topical patches.  Patient has never received prior intervention for post herpetic neuralgia.  He denies distal radiculopathy, lower extremity weakness, bowel or bladder incontinence or saddle anesthesia.    Patient denies night fever/night sweats, urinary incontinence, bowel incontinence, significant weight loss, significant motor weakness, and loss of sensations.        Non-Pharmacologic Treatments:  Physical Therapy/Home Exercise: yes  Ice/Heat:yes  TENS: no  Acupuncture: no  Massage: no  Chiropractic: no    Other: no      Pain Medications:  - Adjuvant Medications: Ambien (Zolpidem), Neurontin (Gabapentin), and Prednisone (Deltasone)        Pain Procedures:   None              Review of Systems:   GENERAL:  No weight loss, malaise or fevers.  HEENT:   No recent changes in vision or hearing  NECK:  Negative for lumps, no difficulty with swallowing.  RESPIRATORY:  Negative for cough, wheezing or shortness of breath, patient denies any recent  "URI.  CARDIOVASCULAR:  Negative for chest pain or palpitations.  GI:  Negative for abdominal discomfort, blood in stools or black stools or change in bowel habits.  MUSCULOSKELETAL:  See HPI.  SKIN:  Negative for lesions, rash, and itching.  PSYCH:  No mood disorder or recent psychosocial stressors.   HEMATOLOGY/LYMPHOLOGY:  Negative for prolonged bleeding, bruising easily or swollen nodes.    NEURO:   No history of syncope, paralysis, seizures or tremors.  All other reviewed and negative other than HPI.          OBJECTIVE:    Telemedicine Physical Exam:   Vitals:    10/02/24 0856   Height: 5' 10" (1.778 m)  Comment: pt reported   Body mass index is 25.68 kg/m².   (reviewed on 10/4/2024)     GENERAL: Well appearing, in no acute distress, alert and oriented x3.  Cooperative.  PSYCH:  Mood and affect appropriate.  SKIN: Skin color & texture with no obvious abnormalities.    HEAD/FACE:  Normocephalic, atraumatic.    PULM:  No difficulty breathing. No nasal flaring. No obvious wheezing.  EXTREMITIES: No obvious deformities. Moving all extremities well, appears to have symmetric strength throughout.  MUSCULOSKELETAL: No obvious atrophy abnormalities are noted.   NEURO: No obvious neurologic deficit.   GAIT: sitting.     Physical Exam: last in clinic visit:  GENERAL: Well appearing, in no acute distress, alert and oriented x3.  PSYCH:  Mood and affect appropriate.  SKIN: Skin color, texture, turgor normal, no rashes or lesions.  HEAD/FACE:  Normocephalic, atraumatic. Cranial nerves grossly intact.    CV: RRR with palpation of the radial artery.  PULM: No evidence of respiratory difficulty, symmetric chest rise.  GI:  Soft and non-tender.    BACK: Straight leg raising in the sitting and supine positions is negative to radicular pain. No pain to palpation over the facet joints of the lumbar spine or spinous processes. Normal range of motion without pain reproduction.  EXTREMITIES: Peripheral joint ROM is full and pain free " without obvious instability or laxity in all four extremities. No deformities, edema, or skin discoloration. Good capillary refill.  MUSCULOSKELETAL: Able to stand on heels & toes.    hip provocative maneuvers are negative.  There is no pain with palpation over the sacroiliac joints bilaterally.  Gaenslen's, Distraction/Compression and  FABERs test is negative.  Facet loading test is negative bilaterally.   Bilateral upper and lower extremity strength is normal and symmetric.  No atrophy or tone abnormalities are noted.    RIGHT Lower extremity: Hip flexion 5/5, Hip Abduction 5/5, Hip Adduction 5/5, Knee extension 5/5, Knee flexion 5/5, Ankle dorsiflexion5/5, Extensor hallucis longus 5/5, Ankle plantarflexion 5/5  LEFT Lower extremity:  Hip flexion 5/5, Hip Abduction 5/5,Hip Adduction 5/5, Knee extension 5/5, Knee flexion 5/5, Ankle dorsiflexion 5/5, Extensor hallucis longus 5/5, Ankle plantarflexion 5/5  -Normal testing knee (patellar) jerk and ankle (achilles) jerk    NEURO: Bilateral upper and lower extremity coordination and muscle stretch reflexes are physiologic and symmetric. No loss of sensation is noted.  GAIT: normal.        Imaging/ Diagnostic Studies/ Labs (Reviewed on 10/4/2024):  None in system        ASSESSMENT: 59 y.o. year old male with     1. Postherpetic neuralgia        2. Dorsalgia, unspecified  MRI Lumbar Spine Without Contrast      3. Neuropathic pain        4. Lumbar radicular pain  X-Ray Lumbar Complete Including Flex And Ext    MRI Lumbar Spine Without Contrast            PLAN:   - Interventions:    - Consider lumbar SATISH -- based on MRI results.     - Hold off on Qutenza patch application for post herpetic neuralgia (4 patches)--45 minute treatment.  Patient has failed to have significant improvement with acyclovir, gabapentin, topical lidocaine. Explained the risks and benefits of the procedure in detail with the patient today in clinic along with alternative treatment options, and the  patient elected to pursue the intervention at this time.      -We have previously discussed future consideration of an epidural, peripheral nerve stimulation, or spinal cord stimulation for post herpetic neuralgia    - Anticoagulation use: No no anticoagulation    - Medications:  -Continue gabapentin (Neurontin) 300mg BID-TID.  We have reviewed potential side effects of this medication including daytime somnolence, weight gain and peripheral edema.  Patient can not further increased secondary to mental fog     report:  Reviewed and consistent with medication use as prescribed.        - Follow up visit: 4 weeks post-procedure       Future Appointments   Date Time Provider Department Center   10/17/2024 12:45 PM Yoselyn Mayen MD [facility] [place]   3/13/2025  8:40 AM LABORATORY, [facility] [place] [facility]   3/20/2025  9:00 AM Filiberto Walter MD [facility] [place]       - Patient Questions: Answered all of the patient's questions regarding diagnosis, therapy, and treatment.    - This condition does not require this patient to take time off of work, and the primary goal of our Pain Management services is to improve the patient's functional capacity.   - I discussed the risks, benefits, and alternatives to potential treatment options. All questions and concerns were fully addressed today in clinic.         Morelia Damon PA-C  Interventional Pain Management - [facility]    Disclaimer:  This note was prepared using voice recognition system and is likely to have sound alike errors that may have been overlooked even after proof reading.  Please call me with any questions.

## 2024-10-02 ENCOUNTER — OFFICE VISIT (OUTPATIENT)
Dept: PAIN MEDICINE | Facility: CLINIC | Age: 59
End: 2024-10-02
Payer: COMMERCIAL

## 2024-10-02 VITALS — HEIGHT: 70 IN | BODY MASS INDEX: 25.68 KG/M2

## 2024-10-02 DIAGNOSIS — M79.2 NEUROPATHIC PAIN: ICD-10-CM

## 2024-10-02 DIAGNOSIS — B02.29 POSTHERPETIC NEURALGIA: Primary | ICD-10-CM

## 2024-10-02 DIAGNOSIS — M54.9 DORSALGIA, UNSPECIFIED: ICD-10-CM

## 2024-10-02 DIAGNOSIS — M54.16 LUMBAR RADICULAR PAIN: ICD-10-CM

## 2024-10-02 PROCEDURE — 99214 OFFICE O/P EST MOD 30 MIN: CPT | Mod: 95,,, | Performed by: PHYSICIAN ASSISTANT

## 2024-10-02 NOTE — PATIENT INSTRUCTIONS
What is an intercostal nerve block?  An intercostal nerve block is an injection of medication under your rib that helps relieve pain in your chest area or upper abdomen.  Your intercostal nerves are located under each of your ribs. When one of these nerves or the tissue around it gets irritated or inflamed, it can cause pain. A nerve block, which contains a steroid medication and local anesthetic, can help reduce the inflammation and alleviate the pain.  Healthcare providers also use intercostal nerve blocks to help diagnose the source of pain.  What is an intercostal nerve block used for?  Intercostal nerve blocks have a few different uses. One is to provide temporary pain relief for acute (sudden and short) or chronic (long-term) pain. The nerve block may reduce inflammation and allow your nerves to heal. Intercostal nerve blocks may help with pain related to:  Rib fractures.  Shingles or postherpetic neuralgia.  Anesthesiologists or surgeons may administer intercostal nerve blocks before surgeries to help manage pain after the procedure. They usually use this in addition to general anesthesia for complex surgeries. For some smaller surgeries, you may be able to have an intercostal nerve block instead of anesthesia. Providers may use intercostal nerve blocks for:  Thoracic (chest) surgery.  Thoracostomy (chest tube placement).  Breast surgery.  Upper abdominal surgery.  Healthcare providers may also use intercostal nerve blocks to determine if the pain youre experiencing is visceral pain or somatic pain. Visceral pain is pain related to your internal organs. Somatic pain occurs in tissues such as your muscles, skin or joints. Visceral pain is often a deep, squeezing pain thats vague and difficult to localize.    Procedure Details  How do I prepare for an intercostal nerve block?  You usually dont have to do anything special to prepare for an intercostal nerve block.  However, you should tell the healthcare  provider whos performing the nerve block if youre taking blood thinner medications (anticoagulants) such as warfarin. Blood thinners may increase your risk of bleeding after the injection. But dont stop taking your medication without talking to the provider who prescribes the medication.  In some cases, your provider may recommend sedation for the procedure. If youre receiving sedation, youll need to fast for six to eight hours before it. Youll also need someone else to drive you home after the procedure.  In any case, your healthcare provider will let you know how to prepare. Be sure to follow their instructions. Dont hesitate to ask questions.  How is an intercostal nerve block done?  In general, you can expect the following when you receive an intercostal nerve block:  Youll lie on a table, on your side (the side not causing pain) or your stomach.  You may receive a mild sedative through an IV line in your arm to help you relax.  The provider will clean your skin with an antiseptic solution. Theyll give you an injection of a local anesthetic to numb the area where youll receive the nerve block. You may still feel a pinch or some discomfort as the needle enters your skin.  The provider may use imaging guidance, such as ultrasound or fluoroscopy, to locate the exact spot where the injection needs to go. Theyll then inject the medication as close to the affected nerve as possible.  After the procedure, youll rest until the medication takes effect.  What happens after an intercostal nerve block injection?  After the injection, youll rest for 15 to 30 minutes to let the medication take effect. A nurse will also observe you during this time to make sure you dont have any unexpected side effects. Youll then be able to go home.    Risks / Benefits  Do intercostal nerve blocks work?  Intercostal nerve blocks are usually successful at providing temporary pain relief. But the duration of pain relief can vary  from person to person. The intercostal nerve is easy for healthcare providers to access for the injection (as compared to other types of nerve blocks).  Some people dont experience any effects from the block and may require different treatment methods to manage their symptoms.  What are the risks or possible complications of an intercostal nerve block?  You may experience bruising or soreness at the injection site, but this should resolve within a couple of days.  Serious complications are rare but can include:  Bleeding.  Infection.  Collapsed lung.  Nerve damage.    Recovery and [place]  How long does an intercostal nerve block last?  How long the pain relief lasts is different for each person. Some people report pain relief soon after the injection, but the pain may return a few hours later as the anesthetic wears off. Longer-term relief usually begins in two to three days once the steroid begins to work.  For some, the relief lasts several months. If the treatment works for you, you may be able to have periodic injections to manage your pain.

## 2024-10-04 ENCOUNTER — PATIENT MESSAGE (OUTPATIENT)
Dept: PAIN MEDICINE | Facility: CLINIC | Age: 59
End: 2024-10-04
Payer: COMMERCIAL

## 2025-03-12 ENCOUNTER — LAB VISIT (OUTPATIENT)
Dept: LAB | Facility: HOSPITAL | Age: 60
End: 2025-03-12
Attending: FAMILY MEDICINE
Payer: COMMERCIAL

## 2025-03-12 DIAGNOSIS — Z13.1 ENCOUNTER FOR SCREENING FOR DIABETES MELLITUS: ICD-10-CM

## 2025-03-12 DIAGNOSIS — E78.2 MIXED HYPERLIPIDEMIA: ICD-10-CM

## 2025-03-12 DIAGNOSIS — I47.10 PAROXYSMAL SVT (SUPRAVENTRICULAR TACHYCARDIA): ICD-10-CM

## 2025-03-12 DIAGNOSIS — Z12.5 ENCOUNTER FOR SCREENING FOR MALIGNANT NEOPLASM OF PROSTATE: ICD-10-CM

## 2025-03-12 DIAGNOSIS — E78.49 ESSENTIAL FAMILIAL HYPERLIPIDEMIA: ICD-10-CM

## 2025-03-12 LAB
ALBUMIN SERPL BCP-MCNC: 4.1 G/DL (ref 3.5–5.2)
ALP SERPL-CCNC: 69 U/L (ref 40–150)
ALT SERPL W/O P-5'-P-CCNC: 38 U/L (ref 10–44)
ANION GAP SERPL CALC-SCNC: 10 MMOL/L (ref 8–16)
AST SERPL-CCNC: 24 U/L (ref 10–40)
BASOPHILS # BLD AUTO: 0.04 K/UL (ref 0–0.2)
BASOPHILS NFR BLD: 0.8 % (ref 0–1.9)
BILIRUB SERPL-MCNC: 0.5 MG/DL (ref 0.1–1)
BUN SERPL-MCNC: 16 MG/DL (ref 6–20)
CALCIUM SERPL-MCNC: 9.4 MG/DL (ref 8.7–10.5)
CHLORIDE SERPL-SCNC: 107 MMOL/L (ref 95–110)
CHOLEST SERPL-MCNC: 264 MG/DL (ref 120–199)
CHOLEST/HDLC SERPL: 6.9 {RATIO} (ref 2–5)
CO2 SERPL-SCNC: 24 MMOL/L (ref 23–29)
COMPLEXED PSA SERPL-MCNC: 1.5 NG/ML (ref 0–4)
CREAT SERPL-MCNC: 1.2 MG/DL (ref 0.5–1.4)
DIFFERENTIAL METHOD BLD: ABNORMAL
EOSINOPHIL # BLD AUTO: 0.2 K/UL (ref 0–0.5)
EOSINOPHIL NFR BLD: 4.2 % (ref 0–8)
ERYTHROCYTE [DISTWIDTH] IN BLOOD BY AUTOMATED COUNT: 13 % (ref 11.5–14.5)
EST. GFR  (NO RACE VARIABLE): >60 ML/MIN/1.73 M^2
ESTIMATED AVG GLUCOSE: 97 MG/DL (ref 68–131)
GLUCOSE SERPL-MCNC: 97 MG/DL (ref 70–110)
HBA1C MFR BLD: 5 % (ref 4–5.6)
HCT VFR BLD AUTO: 54.3 % (ref 40–54)
HDLC SERPL-MCNC: 38 MG/DL (ref 40–75)
HDLC SERPL: 14.4 % (ref 20–50)
HGB BLD-MCNC: 17.2 G/DL (ref 14–18)
IMM GRANULOCYTES # BLD AUTO: 0.01 K/UL (ref 0–0.04)
IMM GRANULOCYTES NFR BLD AUTO: 0.2 % (ref 0–0.5)
LDLC SERPL CALC-MCNC: 179 MG/DL (ref 63–159)
LYMPHOCYTES # BLD AUTO: 1.6 K/UL (ref 1–4.8)
LYMPHOCYTES NFR BLD: 33.3 % (ref 18–48)
MCH RBC QN AUTO: 30 PG (ref 27–31)
MCHC RBC AUTO-ENTMCNC: 31.7 G/DL (ref 32–36)
MCV RBC AUTO: 95 FL (ref 82–98)
MONOCYTES # BLD AUTO: 0.4 K/UL (ref 0.3–1)
MONOCYTES NFR BLD: 8.8 % (ref 4–15)
NEUTROPHILS # BLD AUTO: 2.5 K/UL (ref 1.8–7.7)
NEUTROPHILS NFR BLD: 52.7 % (ref 38–73)
NONHDLC SERPL-MCNC: 226 MG/DL
NRBC BLD-RTO: 0 /100 WBC
PLATELET # BLD AUTO: 243 K/UL (ref 150–450)
PMV BLD AUTO: 10.7 FL (ref 9.2–12.9)
POTASSIUM SERPL-SCNC: 4.6 MMOL/L (ref 3.5–5.1)
PROT SERPL-MCNC: 6.9 G/DL (ref 6–8.4)
RBC # BLD AUTO: 5.74 M/UL (ref 4.6–6.2)
SODIUM SERPL-SCNC: 141 MMOL/L (ref 136–145)
TRIGL SERPL-MCNC: 235 MG/DL (ref 30–150)
TSH SERPL DL<=0.005 MIU/L-ACNC: 1.15 UIU/ML (ref 0.4–4)
WBC # BLD AUTO: 4.75 K/UL (ref 3.9–12.7)

## 2025-03-12 PROCEDURE — 83036 HEMOGLOBIN GLYCOSYLATED A1C: CPT | Performed by: FAMILY MEDICINE

## 2025-03-12 PROCEDURE — 80061 LIPID PANEL: CPT | Performed by: FAMILY MEDICINE

## 2025-03-12 PROCEDURE — 85025 COMPLETE CBC W/AUTO DIFF WBC: CPT | Performed by: FAMILY MEDICINE

## 2025-03-12 PROCEDURE — 84443 ASSAY THYROID STIM HORMONE: CPT | Performed by: FAMILY MEDICINE

## 2025-03-12 PROCEDURE — 84153 ASSAY OF PSA TOTAL: CPT | Performed by: FAMILY MEDICINE

## 2025-03-12 PROCEDURE — 80053 COMPREHEN METABOLIC PANEL: CPT | Performed by: FAMILY MEDICINE

## 2025-03-20 ENCOUNTER — OFFICE VISIT (OUTPATIENT)
Dept: PRIMARY CARE CLINIC | Facility: CLINIC | Age: 60
End: 2025-03-20
Payer: COMMERCIAL

## 2025-03-20 VITALS
BODY MASS INDEX: 25.34 KG/M2 | SYSTOLIC BLOOD PRESSURE: 121 MMHG | TEMPERATURE: 97 F | OXYGEN SATURATION: 98 % | HEART RATE: 77 BPM | HEIGHT: 70 IN | WEIGHT: 177 LBS | DIASTOLIC BLOOD PRESSURE: 88 MMHG

## 2025-03-20 DIAGNOSIS — I47.10 PAROXYSMAL SVT (SUPRAVENTRICULAR TACHYCARDIA): ICD-10-CM

## 2025-03-20 DIAGNOSIS — I10 PRIMARY HYPERTENSION: ICD-10-CM

## 2025-03-20 DIAGNOSIS — E78.49 ESSENTIAL FAMILIAL HYPERLIPIDEMIA: ICD-10-CM

## 2025-03-20 DIAGNOSIS — E78.2 MIXED HYPERLIPIDEMIA: ICD-10-CM

## 2025-03-20 DIAGNOSIS — I71.43 INFRARENAL ABDOMINAL AORTIC ANEURYSM (AAA) WITHOUT RUPTURE: ICD-10-CM

## 2025-03-20 DIAGNOSIS — I65.23 CAROTID ARTERY PLAQUE, BILATERAL: ICD-10-CM

## 2025-03-20 DIAGNOSIS — G47.00 INSOMNIA, UNSPECIFIED TYPE: ICD-10-CM

## 2025-03-20 DIAGNOSIS — Z00.00 ANNUAL PHYSICAL EXAM: Primary | ICD-10-CM

## 2025-03-20 DIAGNOSIS — B02.29 POSTHERPETIC NEURALGIA: ICD-10-CM

## 2025-03-20 DIAGNOSIS — S82.892D CLOSED FRACTURE OF LEFT ANKLE WITH ROUTINE HEALING, SUBSEQUENT ENCOUNTER: ICD-10-CM

## 2025-03-20 DIAGNOSIS — E78.1 PURE HYPERTRIGLYCERIDEMIA: ICD-10-CM

## 2025-03-20 PROCEDURE — 99999 PR PBB SHADOW E&M-EST. PATIENT-LVL IV: CPT | Mod: PBBFAC,,, | Performed by: FAMILY MEDICINE

## 2025-03-20 RX ORDER — GABAPENTIN 300 MG/1
300 CAPSULE ORAL 3 TIMES DAILY
Qty: 270 CAPSULE | Refills: 3 | Status: SHIPPED | OUTPATIENT
Start: 2025-03-20

## 2025-03-20 RX ORDER — EZETIMIBE 10 MG/1
10 TABLET ORAL DAILY
Qty: 90 TABLET | Refills: 3 | Status: SHIPPED | OUTPATIENT
Start: 2025-03-20

## 2025-03-20 RX ORDER — METOPROLOL SUCCINATE 25 MG/1
50 TABLET, EXTENDED RELEASE ORAL 2 TIMES DAILY
Qty: 360 TABLET | Refills: 3 | Status: SHIPPED | OUTPATIENT
Start: 2025-03-20

## 2025-03-20 RX ORDER — ZOLPIDEM TARTRATE 10 MG/1
10 TABLET ORAL NIGHTLY
Qty: 90 TABLET | Refills: 1 | Status: SHIPPED | OUTPATIENT
Start: 2025-03-20

## 2025-03-20 RX ORDER — ICOSAPENT ETHYL 1 G/1
1 CAPSULE ORAL 2 TIMES DAILY
Qty: 180 CAPSULE | Refills: 3 | Status: SHIPPED | OUTPATIENT
Start: 2025-03-20

## 2025-03-20 NOTE — PROGRESS NOTES
"    [facility]       [address] [place] [address]      Phone: [phone]      Fax: [phone]    Filiberto Walter MD                Office Visit  03/20/2025        Subjective      HPI:  Harish Busby is a 59 y.o. male presents today in clinic for "Follow-up (6 month)  ."     59-year-old gentleman presents today for annual wellness exam.      Overall, feels good today.  No chest pain, shortness on breath.  No fever, chills, body aches.  No coughing, sneezing, URI type symptoms.  Appetite normal.  Bowel movements normal.  No urinary issues.      Has SVT.  Takes Toprol-XL twice a day for this.  Keeps it under control, has not had any episodes in awhile.    Also has HLD, familial.  Currently takes Zetia 10 mg daily.  Has tried Praluent injections in the past.  Those made him feel bad.  Give him a bad experience.  Has also tried various statins, they caused memory loss, had no effect on his cholesterol.  So far, tolerates the Zetia.      Also has AAA.  Follows with vascular regularly.  Not big enough to repair.  Stable.    Has issues with sleep.  Uses Ambien at night.  Works well for him.      Has some arthritis, inflammation.  Previous PCP checked for rheumatoid arthritis, labs were negative.  Keeps a bottle of prednisone on hand to use, as needed.  Tends to work well for him.  Only uses it sporadically.      Also deals with post herpetic neuralgia.  Had a bad episode of shingles last year.  Finally got over the rash, but left with pain on the right side of his abdomen, along the belt line.  Takes gabapentin, which helps.  Keeps it tolerable.  Has tried topical lidocaine in other creams in the past, which helps some, but do not give complete relief.  Any friction tends to aggravate it.  Tries to just keep pants lose, avoid contact.  We tried to get him in with Dr. RODRIGUEZ, but he was scheduled with Dr. Mayen instead.  For now, he was just taking the gabapentin " since it works.    States that three weeks ago today he was running for exercise.  He was dark, foot when off the edge of the street, rolled his left ankle.  Wound up falling in a ditch.  Multiple abrasions on the right knee.  Has been cleaning them with triple antibiotic ointment.  The next day, his left ankle was still extremely sore, so he went to the urgent care.  Had x-rays.  Showed what appears to be an avulsion fracture of the lateral malleolus/distal fibula.  He was in a splint for a little over two weeks, then recently transitioned to an Ace wrap.  Feels much better.  Has not seen ortho.    PMH:  HLD.  SVT.  AAA.  Sleep difficulties.  SVT.    PSH:  Kidney cyst.    F MH: SVT.  HLD.  Lung cancer.  Myasthenia gravis.  Allergy: Statins   Social: Works in IT, health information systems.    T: Denies   A: Rarely   D: Denies    Exercise:  Runs occasionally.    Urology: Dr. Moore  Vascular: Dr. James (CVT)  Cardiology:  [facility] (has been several years since he has been there)    Colon: 04/28/2023.  Repeat five years (2028)        The following were updated and reviewed by myself in the chart: medications, past medical history, past surgical history, family history, social history, and allergies.     Medications:  Medications Ordered Prior to Encounter[1]     PMHx:  Past Medical History:   Diagnosis Date    Hyperlipidemia     Hypertension     NCGS (non-celiac gluten sensitivity)     Polycythemia     Preop cardiovascular exam 11/30/2022    Statin intolerance     Supraventricular tachycardia     SVT (supraventricular tachycardia)       Patient Active Problem List    Diagnosis Date Noted    Thoracic aortic aneurysm without rupture, unspecified part 03/18/2024    Sustained SVT 03/18/2024    Essential familial hyperlipidemia 08/11/2023    Postherpetic neuralgia 08/11/2023    Hyperestrogenism in male 08/11/2023    Lactose intolerance 08/11/2023    Carotid artery plaque, bilateral 08/11/2023    Insomnia  08/11/2023    Infrarenal abdominal aortic aneurysm (AAA) without rupture 11/30/2022    PVD (peripheral vascular disease) 11/30/2022    Aortic atherosclerosis 11/30/2022    Primary hypertension 11/02/2022    Kidney cysts 11/02/2022    Kidney stones 11/02/2022    Benign prostatic hyperplasia without lower urinary tract symptoms 11/02/2022    Aortic ectasia 11/02/2022    Supraventricular tachycardia     NCGS (non-celiac gluten sensitivity)     Statin intolerance         PSHx:  Past Surgical History:   Procedure Laterality Date    COLONOSCOPY N/A 4/28/2023    Procedure: COLONOSCOPY;  Surgeon: Brigid Gray MD;  Location: [facility];  Service: Endoscopy;  Laterality: N/A;    DENTAL SURGERY      dental implant    ESOPHAGOGASTRODUODENOSCOPY N/A 4/28/2023    Procedure: EGD (ESOPHAGOGASTRODUODENOSCOPY);  Surgeon: Brigid Gray MD;  Location: [facility];  Service: Endoscopy;  Laterality: N/A;    FINGER SURGERY          FHx:  Family History   Problem Relation Name Age of Onset    Hypertension Mother      Cancer Father          cancer - leukemia    Cancer Brother          lung        Social:  Social History     Socioeconomic History    Marital status:    Occupational History    Occupation: [employer]   Tobacco Use    Smoking status: Former     Current packs/day: 1.00     Average packs/day: 1 pack/day for 5.0 years (5.0 ttl pk-yrs)     Types: Cigarettes     Passive exposure: Never    Smokeless tobacco: Never    Tobacco comments:     Quit 17 yo   Substance and Sexual Activity    Alcohol use: Not Currently    Drug use: Not Currently    Sexual activity: Not Currently        Allergies:  Review of patient's allergies indicates:   Allergen Reactions    Wheat containing prod Hives, Rash, Shortness Of Breath and Swelling    Statins-hmg-coa reductase inhibitors Other (See Comments)    Adhesive Rash    Polyester fibers Rash        ROS:  Review of Systems   Constitutional:  Negative for activity change, appetite  "change, chills and fever.   HENT:  Negative for congestion, postnasal drip, rhinorrhea, sore throat and trouble swallowing.    Respiratory:  Negative for cough and shortness of breath.    Cardiovascular:  Negative for chest pain and palpitations.   Gastrointestinal:  Negative for abdominal pain, constipation, diarrhea, nausea and vomiting.   Genitourinary:  Negative for difficulty urinating.   Musculoskeletal:  Positive for arthralgias and gait problem. Negative for myalgias.   Skin:  Negative for color change and rash.   Neurological:  Negative for headaches.   All other systems reviewed and are negative.         Objective      /88 (BP Location: Left arm, Patient Position: Sitting)   Pulse 77   Temp 96.6 °F (35.9 °C) (Tympanic)   Ht 5' 10" (1.778 m)   Wt 80.3 kg (177 lb 0.5 oz)   SpO2 98%   BMI 25.40 kg/m²   Ht Readings from Last 3 Encounters:   03/20/25 5' 10" (1.778 m)   10/02/24 5' 10" (1.778 m)   09/26/24 5' 10" (1.778 m)     Wt Readings from Last 3 Encounters:   03/20/25 80.3 kg (177 lb 0.5 oz)   09/26/24 81.2 kg (179 lb)   09/20/24 79 kg (174 lb 2.6 oz)       PHYSICAL EXAM:  Physical Exam  Vitals and nursing note reviewed.   Constitutional:       General: He is not in acute distress.     Appearance: Normal appearance.   HENT:      Head: Normocephalic and atraumatic.      Right Ear: Tympanic membrane, ear canal and external ear normal.      Left Ear: Tympanic membrane, ear canal and external ear normal.      Nose: Nose normal. No congestion or rhinorrhea.      Mouth/Throat:      Mouth: Mucous membranes are moist.      Pharynx: Oropharynx is clear. No oropharyngeal exudate or posterior oropharyngeal erythema.   Eyes:      Extraocular Movements: Extraocular movements intact.      Conjunctiva/sclera: Conjunctivae normal.      Pupils: Pupils are equal, round, and reactive to light.   Cardiovascular:      Rate and Rhythm: Normal rate and regular rhythm.   Pulmonary:      Effort: Pulmonary effort is " normal.      Breath sounds: No wheezing, rhonchi or rales.   Musculoskeletal:         General: Normal range of motion.      Cervical back: Normal range of motion.   Lymphadenopathy:      Cervical: No cervical adenopathy.   Skin:     General: Skin is warm and dry.   Neurological:      General: No focal deficit present.      Mental Status: He is alert.              LABS / IMAGING:  Recent Results (from the past 26 weeks)   Lipid Panel    Collection Time: 03/12/25  8:51 AM   Result Value Ref Range    Cholesterol 264 (H) 120 - 199 mg/dL    Triglycerides 235 (H) 30 - 150 mg/dL    HDL 38 (L) 40 - 75 mg/dL    LDL Cholesterol 179.0 (H) 63.0 - 159.0 mg/dL    HDL/Cholesterol Ratio 14.4 (L) 20.0 - 50.0 %    Total Cholesterol/HDL Ratio 6.9 (H) 2.0 - 5.0    Non-HDL Cholesterol 226 mg/dL   TSH    Collection Time: 03/12/25  8:51 AM   Result Value Ref Range    TSH 1.154 0.400 - 4.000 uIU/mL   Comprehensive Metabolic Panel    Collection Time: 03/12/25  8:51 AM   Result Value Ref Range    Sodium 141 136 - 145 mmol/L    Potassium 4.6 3.5 - 5.1 mmol/L    Chloride 107 95 - 110 mmol/L    CO2 24 23 - 29 mmol/L    Glucose 97 70 - 110 mg/dL    BUN 16 6 - 20 mg/dL    Creatinine 1.2 0.5 - 1.4 mg/dL    Calcium 9.4 8.7 - 10.5 mg/dL    Total Protein 6.9 6.0 - 8.4 g/dL    Albumin 4.1 3.5 - 5.2 g/dL    Total Bilirubin 0.5 0.1 - 1.0 mg/dL    Alkaline Phosphatase 69 40 - 150 U/L    AST 24 10 - 40 U/L    ALT 38 10 - 44 U/L    eGFR >60.0 >60 mL/min/1.73 m^2    Anion Gap 10 8 - 16 mmol/L   CBC Auto Differential    Collection Time: 03/12/25  8:51 AM   Result Value Ref Range    WBC 4.75 3.90 - 12.70 K/uL    RBC 5.74 4.60 - 6.20 M/uL    Hemoglobin 17.2 14.0 - 18.0 g/dL    Hematocrit 54.3 (H) 40.0 - 54.0 %    MCV 95 82 - 98 fL    MCH 30.0 27.0 - 31.0 pg    MCHC 31.7 (L) 32.0 - 36.0 g/dL    RDW 13.0 11.5 - 14.5 %    Platelets 243 150 - 450 K/uL    MPV 10.7 9.2 - 12.9 fL    Immature Granulocytes 0.2 0.0 - 0.5 %    Gran # (ANC) 2.5 1.8 - 7.7 K/uL    Immature  Grans (Abs) 0.01 0.00 - 0.04 K/uL    Lymph # 1.6 1.0 - 4.8 K/uL    Mono # 0.4 0.3 - 1.0 K/uL    Eos # 0.2 0.0 - 0.5 K/uL    Baso # 0.04 0.00 - 0.20 K/uL    nRBC 0 0 /100 WBC    Gran % 52.7 38.0 - 73.0 %    Lymph % 33.3 18.0 - 48.0 %    Mono % 8.8 4.0 - 15.0 %    Eosinophil % 4.2 0.0 - 8.0 %    Basophil % 0.8 0.0 - 1.9 %    Differential Method Automated    Hemoglobin A1C    Collection Time: 03/12/25  8:51 AM   Result Value Ref Range    Hemoglobin A1C 5.0 4.0 - 5.6 %    Estimated Avg Glucose 97 68 - 131 mg/dL   PSA, Screening    Collection Time: 03/12/25  8:51 AM   Result Value Ref Range    PSA, Screen 1.5 0.00 - 4.00 ng/mL         Assessment    1. Annual physical exam    2. Closed fracture of left ankle with routine healing, subsequent encounter    3. Mixed hyperlipidemia    4. Essential familial hyperlipidemia    5. Pure hypertriglyceridemia    6. Paroxysmal SVT (supraventricular tachycardia)    7. Primary hypertension    8. Carotid artery plaque, bilateral    9. Infrarenal abdominal aortic aneurysm (AAA) without rupture    10. Postherpetic neuralgia    11. Insomnia, unspecified type          Latosha Moreira was seen today for follow-up.    Diagnoses and all orders for this visit:    Annual physical exam    Closed fracture of left ankle with routine healing, subsequent encounter  -     X-Ray Ankle Complete Left; Future    Mixed hyperlipidemia  -     ezetimibe (ZETIA) 10 mg tablet; Take 1 tablet (10 mg total) by mouth once daily.    Essential familial hyperlipidemia    Pure hypertriglyceridemia  -     icosapent ethyL (VASCEPA) 1 gram Cap; Take 1 capsule (1 g total) by mouth 2 (two) times daily.  -     Lipid Panel; Future    Paroxysmal SVT (supraventricular tachycardia)  -     metoprolol succinate (TOPROL-XL) 25 MG 24 hr tablet; Take 2 tablets (50 mg total) by mouth 2 (two) times a day.    Primary hypertension    Carotid artery plaque, bilateral    Infrarenal abdominal aortic aneurysm (AAA) without  rupture    Postherpetic neuralgia  -     gabapentin (NEURONTIN) 300 MG capsule; Take 1 capsule (300 mg total) by mouth 3 (three) times daily.    Insomnia, unspecified type  -     zolpidem (AMBIEN) 10 mg Tab; Take 1 tablet (10 mg total) by mouth every evening.      Physically, everything looks pretty good.      Med refills, as above.      Reviewed screening blood work with patient.  Everything looks fine, except cholesterol.  Numbers have actually gone up.  Triglycerides are now over 200.  Try starting him on Vascepa to see if that helps.  Recheck lipids in six months.  If we do not see significant improvement, then we can do an eConsult to Cardiology to get their advice.      Ankle seems to be healing fine.  We can repeat x-ray in about three weeks to check for healing.  If feeling better, he can skip it.  If he continues to hurt, or pain increases, then we can have him.      FOLLOW-UP:  Follow up in about 6 months (around 9/20/2025) for recheck, labs 1 week prior.    I spent a total of 30 minutes face to face and non-face to face on the date of this visit.This includes time preparing to see the patient (eg, review of tests, notes), obtaining and/or reviewing additional history from an independent historian and/or outside medical records, documenting clinical information in the electronic health record, independently interpreting results and/or communicating results to the patient/family/caregiver, or care coordinator.  Visit today included increased complexity associated with the care of the episodic problem addressed and managing the longitudinal care of the patient due to the serious and/or complex managed problem(s).    Signed by:  Filiberto Walter MD       [1]   Current Outpatient Medications on File Prior to Visit   Medication Sig Dispense Refill    metFORMIN (GLUCOPHAGE) 500 MG tablet Take 2 tablets (1,000 mg total) by mouth 2 (two) times daily with meals. 360 tablet 3    predniSONE (DELTASONE) 20 MG  tablet Take 1 tablet (20 mg total) by mouth daily as needed (food allergies). 90 tablet 0    [DISCONTINUED] ezetimibe (ZETIA) 10 mg tablet Take 1 tablet (10 mg total) by mouth once daily. 90 tablet 3    [DISCONTINUED] gabapentin (NEURONTIN) 300 MG capsule Take 1 capsule (300 mg total) by mouth 3 (three) times daily. 270 capsule 3    [DISCONTINUED] metoprolol succinate (TOPROL-XL) 25 MG 24 hr tablet Take 2 tablets (50 mg total) by mouth 2 (two) times a day. 360 tablet 3    [DISCONTINUED] zolpidem (AMBIEN) 10 mg Tab Take 1 tablet (10 mg total) by mouth every evening. 30 tablet 5     No current facility-administered medications on file prior to visit.

## 2025-03-20 NOTE — PATIENT INSTRUCTIONS
Physically, everything looks pretty good.      Ankle seems to be healing okay.  Let us plan to take an x-ray of it in about three weeks to make sure it healed okay.  If something looks off, or if you are still hurting, then we can get you in with our ankle specialist to get another opinion.    For the abrasions on the right knee, keep the area clean and dry.  Clean it gently with Dial soap and water twice a day.  Pat dry.  If you notice the skin around it turning red, or if anything starts oozing from the wound, come see us.  If we are booked up, you can always initiate an E visit through [facility] which would at least allow you to send us a picture for review.      All of your recent blood work looks pretty good.  Your cholesterol, however, is still significantly elevated.  Triglycerides are also elevated.  Let us try using some Vascepa to bring down the triglycerides.  Prescription sent.  Take it as directed.  Let us plan to recheck your cholesterol in about six months, just before our next visit.  Now, if insurance does not pay for the Vascepa, no need to recheck the lipids.    Continue to eat a healthy diet.  Be careful with portion sizes.  Includes lots of fresh fruits, vegetables, whole grains, lean proteins.  See info below.    Keep hydrated.  Be sure to drink at least 8-10, 8 oz, glasses of water every day.    Stay active.  Try to do some sort of physical activity every day.  Nothing outrageous, just try walking for 10-15 minutes each day.

## 2025-04-10 ENCOUNTER — HOSPITAL ENCOUNTER (OUTPATIENT)
Dept: RADIOLOGY | Facility: HOSPITAL | Age: 60
Discharge: HOME | End: 2025-04-10
Attending: FAMILY MEDICINE
Payer: COMMERCIAL

## 2025-04-10 DIAGNOSIS — S82.892D CLOSED FRACTURE OF LEFT ANKLE WITH ROUTINE HEALING, SUBSEQUENT ENCOUNTER: ICD-10-CM

## 2025-04-10 PROCEDURE — 73610 X-RAY EXAM OF ANKLE: CPT | Mod: TC,PN,LT

## 2025-04-10 PROCEDURE — 73610 X-RAY EXAM OF ANKLE: CPT | Mod: 26,LT,, | Performed by: RADIOLOGY

## 2025-04-11 ENCOUNTER — RESULTS FOLLOW-UP (OUTPATIENT)
Dept: PRIMARY CARE CLINIC | Facility: CLINIC | Age: 60
End: 2025-04-11

## 2025-04-11 NOTE — PROGRESS NOTES
Very interesting.  Fracture still there, not completely healed yet.    It should heal on its own, with time.  Just continue to rest it as much as possible.  If you want to get a 2nd opinion, let me know and I can get you in with our ortho/sports medicine team.

## 2025-06-12 ENCOUNTER — PATIENT MESSAGE (OUTPATIENT)
Dept: PRIMARY CARE CLINIC | Facility: CLINIC | Age: 60
End: 2025-06-12
Payer: COMMERCIAL

## 2025-07-11 ENCOUNTER — TELEPHONE (OUTPATIENT)
Dept: PHARMACY | Facility: CLINIC | Age: 60
End: 2025-07-11
Payer: COMMERCIAL

## 2025-07-11 NOTE — TELEPHONE ENCOUNTER
[facility] Chart Review & Patient Outreach Details For Medication Adherence Project    Reason for Outreach Encounter: 3rd Party payor non-compliance report ([employer], [employer], [facility], etc)  2.  Patient Outreach Method: Techo[employer] message  3.   Medication in question: metformin   LAST FILLED: 1/6/25 for 90 day supply  Diabetes Medications              metFORMIN (GLUCOPHAGE) 500 MG tablet Take 2 tablets (1,000 mg total) by mouth 2 (two) times daily with meals.              4.  Reviewed and or Updates Made To: Patient Chart  5. Outreach Outcomes and/or actions taken: Sent inquiry to patient: Waiting for response.

## 2025-08-20 ENCOUNTER — TELEPHONE (OUTPATIENT)
Dept: PHARMACY | Facility: CLINIC | Age: 60
End: 2025-08-20
Payer: COMMERCIAL